# Patient Record
Sex: FEMALE | Race: WHITE | Employment: OTHER | URBAN - METROPOLITAN AREA
[De-identification: names, ages, dates, MRNs, and addresses within clinical notes are randomized per-mention and may not be internally consistent; named-entity substitution may affect disease eponyms.]

---

## 2017-01-12 ENCOUNTER — TELEPHONE (OUTPATIENT)
Dept: OTOLARYNGOLOGY | Facility: CLINIC | Age: 67
End: 2017-01-12

## 2017-01-12 DIAGNOSIS — J31.0 CHRONIC RHINITIS: Primary | ICD-10-CM

## 2017-01-12 NOTE — TELEPHONE ENCOUNTER
Reason for Call:  Other prescription    Detailed comments: patient calling. She called the pharmacy for a refill of fluticasone. She needs a refill, but she would like to get a 90 day supply.     Phone Number Patient can be reached at: Home number on file [phone] (home)    Best Time: any    Can we leave a detailed message on this number? YES    Call taken on 1/12/2017 at 3:15 PM by Danita He

## 2017-01-13 RX ORDER — FLUTICASONE PROPIONATE 50 MCG
2 SPRAY, SUSPENSION (ML) NASAL DAILY
Qty: 3 BOTTLE | Refills: 3 | Status: SHIPPED | OUTPATIENT
Start: 2017-01-13 | End: 2017-11-06

## 2017-06-19 ENCOUNTER — OFFICE VISIT (OUTPATIENT)
Dept: OPTOMETRY | Facility: CLINIC | Age: 67
End: 2017-06-19
Payer: COMMERCIAL

## 2017-06-19 DIAGNOSIS — H52.13 MYOPIA OF BOTH EYES: Primary | ICD-10-CM

## 2017-06-19 DIAGNOSIS — H52.4 PRESBYOPIA: ICD-10-CM

## 2017-06-19 DIAGNOSIS — H52.201 ASTIGMATISM OF RIGHT EYE: ICD-10-CM

## 2017-06-19 PROCEDURE — 92004 COMPRE OPH EXAM NEW PT 1/>: CPT | Performed by: OPTOMETRIST

## 2017-06-19 PROCEDURE — 92015 DETERMINE REFRACTIVE STATE: CPT | Performed by: OPTOMETRIST

## 2017-06-19 ASSESSMENT — REFRACTION_MANIFEST
OD_CYLINDER: +0.50
OD_AXIS: 060
METHOD_AUTOREFRACTION: 1
OD_CYLINDER: +0.50
OS_SPHERE: -0.25
OD_SPHERE: -1.00
OS_SPHERE: -0.50
OD_AXIS: 056
OS_ADD: +2.00
OD_SPHERE: -1.00
OS_CYLINDER: SPHERE
OD_ADD: +2.00

## 2017-06-19 ASSESSMENT — SLIT LAMP EXAM - LIDS
COMMENTS: NORMAL
COMMENTS: NORMAL

## 2017-06-19 ASSESSMENT — REFRACTION_WEARINGRX
OD_CYLINDER: +0.50
OS_SPHERE: -0.75
OS_ADD: +2.00
OD_ADD: +2.00
SPECS_TYPE: TRIFOCAL
OD_SPHERE: -1.25
OD_AXIS: 060
OS_CYLINDER: SPHERE

## 2017-06-19 ASSESSMENT — CONF VISUAL FIELD
OS_NORMAL: 1
OD_NORMAL: 1

## 2017-06-19 ASSESSMENT — VISUAL ACUITY
METHOD: SNELLEN - LINEAR
OD_CC: 20/30
OD_CC+: -1
OS_CC: 20/20
CORRECTION_TYPE: GLASSES
OD_CC: 20/20
OS_CC: 20/20-1

## 2017-06-19 ASSESSMENT — CUP TO DISC RATIO
OS_RATIO: 0.2
OD_RATIO: 0.2

## 2017-06-19 ASSESSMENT — TONOMETRY
OS_IOP_MMHG: 20
OD_IOP_MMHG: 20
IOP_METHOD: APPLANATION

## 2017-06-19 ASSESSMENT — KERATOMETRY
OS_AXISANGLE2_DEGREES: 176
OS_K1POWER_DIOPTERS: 44.00
OD_K2POWER_DIOPTERS: 45.00
OD_K1POWER_DIOPTERS: 43.75
OD_AXISANGLE2_DEGREES: 168
OS_K2POWER_DIOPTERS: 44.75

## 2017-06-19 ASSESSMENT — EXTERNAL EXAM - RIGHT EYE: OD_EXAM: NORMAL

## 2017-06-19 ASSESSMENT — EXTERNAL EXAM - LEFT EYE: OS_EXAM: NORMAL

## 2017-06-19 NOTE — MR AVS SNAPSHOT
"              After Visit Summary   6/19/2017    Rozina Bell    MRN: [id number]           Patient Information     Date Of Birth          1950        Visit Information        Provider Department      6/19/2017 12:00 PM Evita Tobias OD [facility]        Care Instructions    Patient was advised of today's exam findings.  Optional to fill new glasses prescription, minimal change  Monitor mild cataracts   Use Refresh Optive drops 2-3 times a day  Return in 1 year for eye exam    Evita Tobias O.D.  [facility]   [address] [place] [address]  [phone]            Follow-ups after your visit        Who to contact     If you have questions or need follow up information about today's clinic visit or your schedule please contact [facility] directly at [phone].  Normal or non-critical lab and imaging results will be communicated to you by MyChart, letter or phone within 4 business days after the clinic has received the results. If you do not hear from us within 7 days, please contact the clinic through MyChart or phone. If you have a critical or abnormal lab result, we will notify you by phone as soon as possible.  Submit refill requests through [employer] or call your pharmacy and they will forward the refill request to us. Please allow 3 business days for your refill to be completed.          Additional Information About Your Visit        MyChart Information     [employer] lets you send messages to your doctor, view your test results, renew your prescriptions, schedule appointments and more. To sign up, go to [email or web address] . Click on \"Log in\" on the left side of the screen, which will take you to the Welcome page. Then click on \"Sign up Now\" on the right side of the page.     You will be asked to enter the access code listed below, as well as some personal information. Please follow the directions to create your username and password.     Your " access code is: [email or web address]  Expires: 2017 12:54 PM     Your access code will  in 90 days. If you need help or a new code, please call your [facility] clinic or [phone].        Care EveryWhere ID     This is your Care EveryWhere ID. This could be used by other organizations to access your [facility] medical records  [id number]         Blood Pressure from Last 3 Encounters:   10/17/16 136/88   16 144/62   16 (!) 152/93    Weight from Last 3 Encounters:   10/17/16 126.1 kg (278 lb)   16 133.4 kg (294 lb)   08/18/15 126.1 kg (278 lb)              Today, you had the following     No orders found for display       Primary Care Provider Office Phone # Fax #    Deisy Sharpe [phone]       [facility] [address] [address]        Thank you!     [facility] you for [facility]  [facility]. Our goal is always to provide you with excellent care. Hearing back from our patients is one way we can continue to improve our services. Please take a few minutes to complete the written survey that you may receive in the mail after your visit with us. Thank you!             Your Updated Medication List - Protect others around you: Learn how to safely use, store and throw away your medicines at www.disposemymeds.org.          This list is accurate as of: 17 12:54 PM.  Always use your most recent med list.                   Brand Name Dispense Instructions for use    calcium + D 600-200 MG-UNIT Tabs   Generic drug:  calcium carbonate-vitamin D      1 TABLET TWICE  DAILY       * fluticasone 50 MCG/ACT spray    FLONASE    16 g    Spray 2 sprays into both nostrils daily       * fluticasone 50 MCG/ACT spray    FLONASE    3 Bottle    Spray 2 sprays into both nostrils daily       GLUCOSAMINE CHONDROITIN Tabs      1 TABLET DAILY       levothyroxine 50 MCG tablet    SYNTHROID/LEVOTHROID    90 tablet    Take 1 tablet (50 mcg) by mouth  daily       magnesium oxide 400 MG Caps     270 capsule    Take 1 tablet by mouth 3 times daily       meloxicam 15 MG tablet    MOBIC    90 tablet    TAKE 1 TABLET BY MOUTH EVERY DAY.       MULTI-VITAMIN PO      None Entered       * order for DME     1 Device    Equipment being ordered: compression stocking joey       * order for DME     1 Device    Equipment being ordered: [id number] Thumb Splint, MD RICARDO [id number] Thumb splint, HAILEY LT MD       VITAMIN C PO      Take 500 mg by mouth daily       vitamin D 1000 UNITS capsule     180 capsule    Take 2,000 Units by mouth daily       VITAMIN E PO      1 TABLET DAILY       * Notice:  This list has 4 medication(s) that are the same as other medications prescribed for you. Read the directions carefully, and ask your doctor or other care provider to review them with you.

## 2017-06-19 NOTE — PATIENT INSTRUCTIONS
Patient was advised of today's exam findings.  Optional to fill new glasses prescription, minimal change  Monitor mild cataracts   Use Preservative free artificial tears in vials 2-3 times a day  Return in 1 year for eye exam    Evita Tobias O.D.  [facility]   [address] [place], [place] [id number] [phone]

## 2017-06-19 NOTE — PROGRESS NOTES
Chief Complaint   Patient presents with     COMPREHENSIVE EYE EXAM        Have my cataracts changed?    Last Eye Exam: Last exam was 2015  Dilated Previously: Yes    What are you currently using to see?  Glasses, has two pairs, prefers the older ones        Distance Vision Acuity: Noticed gradual change in both eyes, wears glasses to drive. But sometimes goes without them at home     Near Vision Acuity: Also has noticed change to her near vision as well, also has trouble adapting from far to near.     Eye Comfort: dry and itchy at times, mouth also dry from medications  Do you use eye drops? : Yes: Uses them sometimes, Some tears in bottles made eyes feel worse, liked ones in vials  Occupation or Hobbies: Retired     Albertina Apple Optometric Assistant           Medical, surgical and family histories reviewed and updated 6/19/2017.       OBJECTIVE: See Ophthalmology exam    ASSESSMENT:    ICD-10-CM    1. Myopia of both eyes H52.13 EYE EXAM (SIMPLE-NONBILLABLE)     REFRACTIVE STATUS   2. Presbyopia H52.4 EYE EXAM (SIMPLE-NONBILLABLE)     REFRACTIVE STATUS   3. Astigmatism of right eye H52.201 EYE EXAM (SIMPLE-NONBILLABLE)     REFRACTIVE STATUS   4. Cataract, nuclear, bilateral H25.13 EYE EXAM (SIMPLE-NONBILLABLE)     REFRACTIVE STATUS      PLAN:     Patient Instructions   Patient was advised of today's exam findings.  Optional to fill new glasses prescription, minimal change  Monitor mild cataracts   Use Preservative free artificial tears in vials 2-3 times a day  Return in 1 year for eye exam    Evita Tobias O.D.  [facility]   [address] [place] [address] [phone]

## 2017-07-14 DIAGNOSIS — R09.82 PND (POST-NASAL DRIP): ICD-10-CM

## 2017-07-14 NOTE — TELEPHONE ENCOUNTER
fluticasone (FLONASE) 50 MCG/ACT spray      Last Written Prescription Date: 1/13/17  Last Fill Quantity: 3,  # refills: 3   Last Office Visit with [facility], [facility] or [facility] prescribing provider: 5/11/16                                         Next 5 appointments (look out 90 days)     Jul 17, 2017  2:15 PM CDT   SHORT with Deisy Sharpe MD   [facility] ([facility])    [address]  [place] [address]   [phone]

## 2017-07-17 ENCOUNTER — OFFICE VISIT (OUTPATIENT)
Dept: FAMILY MEDICINE | Facility: CLINIC | Age: 67
End: 2017-07-17
Payer: COMMERCIAL

## 2017-07-17 VITALS
HEIGHT: 64 IN | TEMPERATURE: 98.8 F | BODY MASS INDEX: 47.12 KG/M2 | WEIGHT: 276 LBS | SYSTOLIC BLOOD PRESSURE: 134 MMHG | DIASTOLIC BLOOD PRESSURE: 86 MMHG | OXYGEN SATURATION: 99 % | HEART RATE: 76 BPM

## 2017-07-17 DIAGNOSIS — I10 HYPERTENSION GOAL BP (BLOOD PRESSURE) < 140/90: ICD-10-CM

## 2017-07-17 DIAGNOSIS — R35.0 URINARY FREQUENCY: Primary | ICD-10-CM

## 2017-07-17 DIAGNOSIS — M54.50 CHRONIC BILATERAL LOW BACK PAIN WITHOUT SCIATICA: ICD-10-CM

## 2017-07-17 DIAGNOSIS — J32.0 CHRONIC MAXILLARY SINUSITIS: ICD-10-CM

## 2017-07-17 DIAGNOSIS — E03.9 ACQUIRED HYPOTHYROIDISM: ICD-10-CM

## 2017-07-17 DIAGNOSIS — J34.89 SINUS PAIN: ICD-10-CM

## 2017-07-17 DIAGNOSIS — G89.29 CHRONIC BILATERAL LOW BACK PAIN WITHOUT SCIATICA: ICD-10-CM

## 2017-07-17 DIAGNOSIS — M18.0 PRIMARY OSTEOARTHRITIS OF BOTH FIRST CARPOMETACARPAL JOINTS: ICD-10-CM

## 2017-07-17 LAB
ALBUMIN UR-MCNC: NEGATIVE MG/DL
APPEARANCE UR: CLEAR
BILIRUB UR QL STRIP: NEGATIVE
COLOR UR AUTO: YELLOW
GLUCOSE UR STRIP-MCNC: NEGATIVE MG/DL
HGB UR QL STRIP: NEGATIVE
KETONES UR STRIP-MCNC: NEGATIVE MG/DL
LEUKOCYTE ESTERASE UR QL STRIP: NEGATIVE
NITRATE UR QL: NEGATIVE
PH UR STRIP: 6 PH (ref 5–7)
SP GR UR STRIP: <=1.005 (ref 1–1.03)
URN SPEC COLLECT METH UR: NORMAL
UROBILINOGEN UR STRIP-ACNC: 0.2 EU/DL (ref 0.2–1)

## 2017-07-17 PROCEDURE — 81003 URINALYSIS AUTO W/O SCOPE: CPT | Performed by: INTERNAL MEDICINE

## 2017-07-17 PROCEDURE — 99214 OFFICE O/P EST MOD 30 MIN: CPT | Performed by: INTERNAL MEDICINE

## 2017-07-17 RX ORDER — HYDROXYZINE HYDROCHLORIDE 25 MG/1
25 TABLET, FILM COATED ORAL 3 TIMES DAILY PRN
COMMUNITY
End: 2017-12-27

## 2017-07-17 RX ORDER — LEVOTHYROXINE SODIUM 50 MCG
50 TABLET ORAL DAILY
Qty: 90 TABLET | Refills: 1 | Status: SHIPPED | OUTPATIENT
Start: 2017-07-17 | End: 2017-11-06

## 2017-07-17 RX ORDER — FLUTICASONE PROPIONATE 50 MCG
2 SPRAY, SUSPENSION (ML) NASAL DAILY
Qty: 16 G | Refills: 3 | Status: SHIPPED | OUTPATIENT
Start: 2017-07-17 | End: 2017-11-06

## 2017-07-17 ASSESSMENT — PAIN SCALES - GENERAL: PAINLEVEL: SEVERE PAIN (6)

## 2017-07-17 NOTE — TELEPHONE ENCOUNTER
Pending Prescriptions:                       Disp   Refills    fluticasone (FLONASE) 50 MCG/ACT spray    16 g   3            Sig: Spray 2 sprays into both nostrils daily

## 2017-07-17 NOTE — MR AVS SNAPSHOT
After Visit Summary   7/17/2017    Rozina Bell    MRN: [id number]           Patient Information     Date Of Birth          1950        Visit Information        Provider Department      7/17/2017 2:15 PM Deisy Sharpe MD [facility]        Today's Diagnoses     Urinary frequency    -  1    Sinus pain        Chronic bilateral low back pain without sciatica        Hypertension goal BP (blood pressure) < 140/90        Primary osteoarthritis of both first carpometacarpal joints        Chronic maxillary sinusitis        Acquired hypothyroidism          Care Instructions    Follow up with Dr. De La Cruz for your thumbs.  Schedule the CT scan of your sinuses.  Imaging Scheduling number is [phone].  Try Allegra (alert) or Zyrtec (sedating) for allergies.    [facility]    If you have any questions regarding to your visit please contact your care team:     Team Pink:   Clinic Hours Telephone Number   Internal Medicine:  Dr. Deisy Porter NP       7am-7pm  Monday - Thursday   7am-5pm  Fridays  [phone]  (Appointment scheduling available 24/7)    Questions about your visit?  Team Line  [phone]   Urgent Care - Divya Boateng and [place] - 11am-9pm Monday-Friday Saturday-Sunday- 9am-5pm   [place] - 5pm-9pm Monday-Friday Saturday-Sunday- 9am-5pm  [phone] - Divya   [phone] - [place]       What options do I have for visits at the clinic other than the traditional office visit?  To expand how we care for you, many of our providers are utilizing electronic visits (e-visits) and telephone visits, when medically appropriate, for interactions with their patients rather than a visit in the clinic.   We also offer nurse visits for many medical concerns. Just like any other service, we will bill your insurance company for this type of visit based on time spent on the phone with your provider. Not all insurance  companies cover these visits. Please check with your medical insurance if this type of visit is covered. You will be responsible for any charges that are not paid by your insurance.      E-visits via [employer]:  generally incur a $35.00 fee.  Telephone visits:  Time spent on the phone: *charged based on time that is spent on the phone in increments of 10 minutes. Estimated cost:   5-10 mins $30.00   11-20 mins. $59.00   21-30 mins. $85.00   Use [employer] (secure email communication and access to your chart) to send your primary care provider a message or make an appointment. Ask someone on your Team how to sign up for [employer].    For a Price Quote for your services, please call our Sl[facility] Line at [phone].    As always, Thank you for trusting us with your health care needs!    Discharge by MARY LAWRENCE             Follow-ups after your visit        Future tests that were ordered for you today     Open Future Orders        Priority Expected Expires Ordered    CT Maxillofacial w/o Contrast Routine  7/17/2018 7/17/2017            Who to contact     If you have questions or need follow up information about today's clinic visit or your schedule please contact [facility] directly at [phone].  Normal or non-critical lab and imaging results will be communicated to you by [employer], letter or phone within 4 business days after the clinic has received the results. If you do not hear from us within 7 days, please contact the clinic through [employer] or phone. If you have a critical or abnormal lab result, we will notify you by phone as soon as possible.  Submit refill requests through [employer] or call your pharmacy and they will forward the refill request to us. Please allow 3 business days for your refill to be completed.          Additional Information About Your Visit        [employer] Information     [employer] lets you send messages to your doctor, view your test results, renew your prescriptions, schedule  "appointments and more. To sign up, go to [email or web address] . Click on \"Log in\" on the left side of the screen, which will take you to the Welcome page. Then click on \"Sign up Now\" on the right side of the page.     You will be asked to enter the access code listed below, as well as some personal information. Please follow the directions to create your username and password.     Your access code is: [email or web address]  Expires: 2017 12:54 PM     Your access code will  in 90 days. If you need help or a new code, please call your [facility] clinic or [phone].        Care EveryWhere ID     This is your Care EveryWhere ID. This could be used by other organizations to access your [facility] medical records  [id number]        Your Vitals Were     Pulse Temperature Height Pulse Oximetry BMI (Body Mass Index)       76 98.8  F (37.1  C) (Oral) 5' 3.5\" (1.613 m) 99% 48.12 kg/m2        Blood Pressure from Last 3 Encounters:   17 134/86   10/17/16 136/88   16 144/62    Weight from Last 3 Encounters:   17 276 lb (125.2 kg)   10/17/16 278 lb (126.1 kg)   16 294 lb (133.4 kg)              We Performed the Following     *UA reflex to Microscopic and Culture ([place] and [facility] (except [place] and [place])          Today's Medication Changes          These changes are accurate as of: 17  3:01 PM.  If you have any questions, ask your nurse or doctor.               Start taking these medicines.        Dose/Directions    SYNTHROID 50 MCG tablet   Used for:  Acquired hypothyroidism   Generic drug:  levothyroxine   Replaces:  levothyroxine 50 MCG tablet   Started by:  Deisy Sharpe MD        Dose:  50 mcg   Take 1 tablet (50 mcg) by mouth daily   Quantity:  90 tablet   Refills:  1         Stop taking these medicines if you haven't already. Please contact your care team if you have questions.     levothyroxine 50 MCG tablet   Commonly known as:  SYNTHROID/LEVOTHROID   Replaced by:  " SYNTHROID 50 MCG tablet   Stopped by:  Deisy Sharpe MD                Where to get your medicines      [id number] [place], [place] - [address]  [address], [place] [address]    Hours:  24-hours Phone:  [phone]     SYNTHROID 50 MCG tablet                Primary Care Provider Office Phone # Fax #    Deisy Sharpe [phone]       [facility] [address] [address]        Equal Access to Services     [facility]: Hadii aad ku hadasho Soomaali, waaxda luqadaha, qaybta kaalmada adeegyada, waxay ashiain hayaan judith tavares . So [facility] [phone].    ATENCIÓN: Si habla español, tiene a perkins disposición servicios gratuitos de asistencia lingüística. [facility] [phone].    We comply with applicable federal civil rights laws and [place] laws. We do not discriminate on the basis of race, color, national origin, age, disability sex, sexual orientation or gender identity.            Thank you!     Thank you for choosing [facility]  for your care. Our goal is always to provide you with excellent care. Hearing back from our patients is one way we can continue to improve our services. Please take a few minutes to complete the written survey that you may receive in the mail after your visit with us. Thank you!             Your Updated Medication List - Protect others around you: Learn how to safely use, store and throw away your medicines at www.disposemymeds.org.          This list is accurate as of: 7/17/17  3:01 PM.  Always use your most recent med list.                   Brand Name Dispense Instructions for use Diagnosis    calcium + D 600-200 MG-UNIT Tabs   Generic drug:  calcium carbonate-vitamin D      1 TABLET TWICE  DAILY        * fluticasone 50 MCG/ACT spray    FLONASE    16 g    Spray 2 sprays into both nostrils daily    PND  (post-nasal drip)       * fluticasone 50 MCG/ACT spray    FLONASE    3 Bottle    Spray 2 sprays into both nostrils daily    Chronic rhinitis       GLUCOSAMINE CHONDROITIN Tabs      1 TABLET DAILY        hydrOXYzine 25 MG tablet    ATARAX     Take 25 mg by mouth 3 times daily as needed for itching        magnesium oxide 400 MG Caps     270 capsule    Take 1 tablet by mouth 3 times daily    Hypomagnesemia       meloxicam 15 MG tablet    MOBIC    90 tablet    TAKE 1 TABLET BY MOUTH EVERY DAY.    Spinal stenosis, unspecified spinal region       MULTI-VITAMIN PO      None Entered        * order for DME     1 Device    Equipment being ordered: compression stocking joey    Edema       * order for DME     1 Device    Equipment being ordered: [id number] Thumb Splint, CMC RT, MD  [id number] Thumb splint, CMC, LT MD    Primary osteoarthritis of first carpometacarpal joint of right hand, Primary osteoarthritis of first carpometacarpal joint of left hand       SYNTHROID 50 MCG tablet   Generic drug:  levothyroxine     90 tablet    Take 1 tablet (50 mcg) by mouth daily    Acquired hypothyroidism       VITAMIN C PO      Take 500 mg by mouth daily        vitamin D 1000 UNITS capsule     180 capsule    Take 2,000 Units by mouth daily    Hypovitaminosis D       VITAMIN E PO      1 TABLET DAILY        * Notice:  This list has 4 medication(s) that are the same as other medications prescribed for you. Read the directions carefully, and ask your doctor or other care provider to review them with you.

## 2017-07-17 NOTE — PATIENT INSTRUCTIONS
Follow up with Dr. De La Cruz for your thumbs.  Schedule the CT scan of your sinuses.  Imaging Scheduling number is [phone].  Try Allegra (alert) or Zyrtec (sedating) for allergies.    [facility]    If you have any questions regarding to your visit please contact your care team:     Team Pink:   Clinic Hours Telephone Number   Internal Medicine:  Dr. Deisy Porter, NP       7am-7pm  Monday - Thursday   7am-5pm  Fridays  [phone]  (Appointment scheduling available 24/7)    Questions about your visit?  Team Line  [phone]   Urgent Care - Divya Boateng and [place] - 11am-9pm Monday-Friday Saturday-Sunday- 9am-5pm   [place] - 5pm-9pm Monday-Friday Saturday-Sunday- 9am-5pm  [phone] - Divya   [phone] - [place]       What options do I have for visits at the clinic other than the traditional office visit?  To expand how we care for you, many of our providers are utilizing electronic visits (e-visits) and telephone visits, when medically appropriate, for interactions with their patients rather than a visit in the clinic.   We also offer nurse visits for many medical concerns. Just like any other service, we will bill your insurance company for this type of visit based on time spent on the phone with your provider. Not all insurance companies cover these visits. Please check with your medical insurance if this type of visit is covered. You will be responsible for any charges that are not paid by your insurance.      E-visits via [employer]:  generally incur a $35.00 fee.  Telephone visits:  Time spent on the phone: *charged based on time that is spent on the phone in increments of 10 minutes. Estimated cost:   5-10 mins $30.00   11-20 mins. $59.00   21-30 mins. $85.00   Use [employer] (secure email communication and access to your chart) to send your primary care provider a message or make an appointment. Ask someone on your Team how to  sign up for [employer].    For a Price Quote for your services, please call our Consumer Price Line at [phone].    As always, Thank you for trusting us with your health care needs!    Discharge by MARY LAWRENCE

## 2017-07-17 NOTE — LETTER
[facility]  [address] [place] [address]    July 18, 2017    Rozina Bell   [address] [id number]  [place] [address]          Dear Rozina,    Natasha is a copy of your results.  This is a normal urine study.    Results for orders placed or performed in visit on 07/17/17   *UA reflex to Microscopic and Culture ([place] and [facility] (except [place] and [place])   Result Value Ref Range    Color Urine Yellow     Appearance Urine Clear     Glucose Urine Negative NEG mg/dL    Bilirubin Urine Negative NEG    Ketones Urine Negative NEG mg/dL    Specific Gravity Urine <=1.005 1.003 - 1.035    Blood Urine Negative NEG    pH Urine 6.0 5.0 - 7.0 pH    Protein Albumin Urine Negative NEG mg/dL    Urobilinogen Urine 0.2 0.2 - 1.0 EU/dL    Nitrite Urine Negative NEG    Leukocyte Esterase Urine Negative NEG    Source Midstream Urine      If you have any questions or concerns, please call myself or my nurse at [phone].    Sincerely,        Deisy Sharpe MD/rk

## 2017-07-17 NOTE — NURSING NOTE
"Chief Complaint   Patient presents with     Back Pain     ongoing      Sinus Problem     more headache than usual, bloody in the nose      Kidney Problem     wants kidney checked        Initial Pulse 76  Temp 98.8  F (37.1  C) (Oral)  Ht 5' 3.5\" (1.613 m)  Wt 276 lb (125.2 kg)  SpO2 99%  BMI 48.12 kg/m2 Estimated body mass index is 48.12 kg/(m^2) as calculated from the following:    Height as of this encounter: 5' 3.5\" (1.613 m).    Weight as of this encounter: 276 lb (125.2 kg).  Medication Reconciliation: complete     Jenna Carey MA       "

## 2017-07-17 NOTE — PROGRESS NOTES
"INTERNAL MEDICINE  SUBJECTIVE:                                                    Rozina Bell is a 67 year old female who presents to clinic today for the following health issues:    Back pain: Patient notes her back pain is ongoing. However, for the past month, current back pain has been \"different\" than her usual pain. The pain is present in lower back. She mentions it feels similar to when she had a kidney infection in college. She would like to have kidney function checked. She has constant urinary frequency. She denies hematuria and dysuria. She follows up with Dr Mckeon next week.     Sinus pressure: She endorses sinus pressure and worsening headaches since May. She relates she has sores in her nose, that bleed frequently. She uses flonase nasal spray. Patient notes associated sore throat as well due to allergies. No other allergy medications.     Elevated BP: BP is elevated in clinic today, 134/86. Her blood pressure was 111/70 at home prior to appointment.     Bilateral thumb pain: Inquires about surgery on thumbs. She is unable to knit as her thumbs are weak.     Cataracts: Recently developing. Follows with optometrist.     Easy bruising: She mentions her right 2nd finger bruises easily around vein.     Hypothyroidism: She reports constant fatigue. She is taking levothyroxine.     Weight loss: She notes she has been eating lentils frequently for protein intake. She has lost 20 pounds over the last year.    Wt Readings from Last 3 Encounters:   07/17/17 125.2 kg (276 lb)   10/17/16 126.1 kg (278 lb)   06/07/16 133.4 kg (294 lb)       Problem list and histories reviewed & adjusted, as indicated.  Additional history: as documented    Patient Active Problem List   Diagnosis     Hypothyroidism     Allergic state     Spinal stenosis     CARDIOVASCULAR SCREENING; LDL GOAL LESS THAN 160     Fibromyalgia     Obesity     AK (actinic keratosis)     Hypertension goal BP (blood pressure) < 140/90     Skin cancer "     Advanced directives, counseling/discussion     Degenerative spondylolisthesis     Metatarsal fracture     Prepatellar bursitis     MMT (medial meniscus tear)     Lumbar spinal stenosis     Hypovitaminosis D     Primary osteoarthritis of both first carpometacarpal joints     Past Surgical History:   Procedure Laterality Date     BREAST BIOPSY, RT/LT  8/08    Breat Biopsy RT/LT     C VAG HYST,RMV TUBE/OVARY  age 34    endometriosis     HC REMOVAL GALLBLADDER  age 31     HYSTERECTOMY, PAP NO LONGER INDICATED         Social History   Substance Use Topics     Smoking status: Never Smoker     Smokeless tobacco: Never Used      Comment: Lives in smoke free household     Alcohol use No     Family History   Problem Relation Age of Onset     HEART DISEASE Mother      Arthritis Mother      Cardiovascular Mother      Thyroid Disease Mother      Hypertension Mother      CEREBROVASCULAR DISEASE Mother      GASTROINTESTINAL DISEASE Father      CANCER Father      Respiratory Father      HEART DISEASE Maternal Grandfather      DIABETES Paternal Grandfather      CANCER Brother      DIABETES Brother      HEART DISEASE Sister      Neurologic Disorder Sister      EPILEPSY     Allergies Son      HEART DISEASE Brother      TRIPLE BYPASS     Lipids Brother      Hypertension Brother      Chronic Obstructive Pulmonary Disease Brother      Glaucoma No family hx of      Macular Degeneration No family hx of          Labs reviewed in EPIC    Reviewed and updated as needed this visit by clinical staff  Tobacco  Allergies  Meds  Med Hx  Surg Hx  Fam Hx  Soc Hx      Reviewed and updated as needed this visit by Provider         ROS:  C: NEGATIVE for fever, chills, change in weight  I: NEGATIVE for worrisome rashes, moles or lesions  E: POSITIVE for cataracts NEGATIVE for vision changes or irritation  E/M: POSITIVE for sore throat, sinus pressure, epistaxis NEGATIVE for ear, mouth   R: NEGATIVE for significant cough or SOB  CV: NEGATIVE for  "chest pain, palpitations or peripheral edema  : POSITIVE for urinary frequency NEGATIVE for dysuria, or hematuria  M: POSITIVE for low back pain NEGATIVE for significant arthralgias or myalgia  N: POSITIVE for headaches NEGATIVE for weakness, dizziness or paresthesias  P: NEGATIVE for changes in mood or affect  All other systems reviewed and were negative.    This document serves as a record of the services and decisions personally performed and made by Deisy Sharpe MD. It was created on her behalf by Radha Cain, a trained medical scribe. The creation of this document is based on the provider's statements to the medical scribe.  Radha Cain 2:35 PM July 17, 2017    OBJECTIVE:   /86  Pulse 76  Temp 98.8  F (37.1  C) (Oral)  Ht 1.613 m (5' 3.5\")  Wt 125.2 kg (276 lb)  SpO2 99%  BMI 48.12 kg/m2  Body mass index is 48.12 kg/(m^2).  GENERAL: healthy, alert and no distress  HENT: nose with areas of coagulated blood on medial aspect of nares, and mouth without ulcers or lesions  NECK: no adenopathy, no asymmetry, masses, or scars and thyroid normal to palpation  RESP: lungs clear to auscultation - no rales, rhonchi or wheezes  CV: regular rate and rhythm, normal S1 S2, no S3 or S4, no murmur, click or rub, no peripheral edema and peripheral pulses strong  ABDOMEN: soft, nontender, no hepatosplenomegaly, no masses and bowel sounds normal  MS: no gross musculoskeletal defects noted, no edema, Hypertrophy at base of bilateral thumbs  NEURO: Normal strength and tone, mentation intact and speech normal  BACK: no CVA tenderness, no paralumbar tenderness  PSYCH: mentation appears normal, affect normal/bright      Diagnostic Test Results:  No results found for this or any previous visit (from the past 24 hour(s)).    ASSESSMENT/PLAN:   I spent 20 minutes of time with the patient and >50% of it was in education and counseling regarding low back pain and sinus pressure.    1. Urinary frequency  Patient " "notes urinary frequency and \"different\" back pain that is similar to pain she experienced with previous kidney infection. UA today to evaluate. I highly suspect this is related to musculoskeletal cause.    - *UA reflex to Microscopic and Culture ([place] and [facility] (except [place] and [place])    2. Sinus pain  Evaluation for sinusitis with CT scan. Will treat pending results. The last antibiotic she took worked well.  Has many allergies.      3. Chronic bilateral low back pain without sciatica  Ongoing, patient is constant. Continue current medication regimen.     4. Hypertension goal BP (blood pressure) < 140/90  BP slightly elevated in clinic today, however BP has been well controlled at home. Continue current measures.     5. Primary osteoarthritis of both first carpometacarpal joints  Follow up with Dr De La Cruz to discuss further options.     6. Chronic maxillary sinusitis  Will treat pending CT scan. Recommended patient use OTC antihistamine for allergy relief. Consider follow up with ENT.   - CT Maxillofacial w/o Contrast; Future    7. Acquired hypothyroidism  - SYNTHROID 50 MCG tablet; Take 1 tablet (50 mcg) by mouth daily  Dispense: 90 tablet; Refill: 1  Would like to try trade name to see if this makes her less tired.    Patient Instructions   Follow up with Dr. De La Cruz for your thumbs.  Schedule the CT scan of your sinuses.  Imaging Scheduling number is [phone].  Try Allegra (alert) or Zyrtec (sedating) for allergies.      The information in this document, created by the medical scribe for me, accurately reflects the services I personally performed and the decisions made by me. I have reviewed and approved this document for accuracy prior to leaving the patient care area.  July 17, 2017 2:59 PM    Deisy Sharpe MD  [facility]    Start 2:40 PM   End 3:00 PM    "

## 2017-07-24 ENCOUNTER — MEDICAL CORRESPONDENCE (OUTPATIENT)
Dept: MRI IMAGING | Facility: CLINIC | Age: 67
End: 2017-07-24

## 2017-08-01 ENCOUNTER — TRANSFERRED RECORDS (OUTPATIENT)
Dept: HEALTH INFORMATION MANAGEMENT | Facility: CLINIC | Age: 67
End: 2017-08-01

## 2017-08-11 ENCOUNTER — MEDICAL CORRESPONDENCE (OUTPATIENT)
Dept: HEALTH INFORMATION MANAGEMENT | Facility: CLINIC | Age: 67
End: 2017-08-11

## 2017-08-15 ENCOUNTER — TELEPHONE (OUTPATIENT)
Dept: INTERNAL MEDICINE | Facility: CLINIC | Age: 67
End: 2017-08-15

## 2017-08-15 NOTE — TELEPHONE ENCOUNTER
"Rozina Bell is a 67 year old female who calls with intermittent SOB, palpitation.    NURSING ASSESSMENT:  Description:  Intermittent SOB, palpitations, feeling flushed. Denies any symptoms at present. Last episode was yesterday. BP was 117/68 last night  Onset/duration:  For a couple of months. Usually lasts a few seconds, \"when I count and get to 5 (seconds)\"  Precip. factors:    Associated symptoms: intermittent lightheadedness, arms and legs tingling, anxiety  Improves/worsens symptoms:  Episodes happen randomly  Pain scale (0-10)   0/10  LMP/preg/breast feeding:    Last exam/Treatment:  7/17/17  Allergies:   Allergies   Allergen Reactions     Amitriptyline      jittery     Celebrex [Celecoxib]      Epinephrine-Lidocaine-Na Metabisulfite Other (See Comments)     Palpitations, flushing     Latex      Morphine      Penicillins      Sulfa Drugs      Ultram [Tramadol Hcl]      Yellow Dye      Zithromax [Azithromycin]        MEDICATIONS:   Taking medication(s) as prescribed? N/A  Taking over the counter medication(s?) N/A  Any medication side effects? Not Applicable    Any barriers to taking medication(s) as prescribed?  N/A  Medication(s) improving/managing symptoms?  N/A  Medication reconciliation completed: N/A      NURSING PLAN: Nursing advice to patient to be seen for further eval    RECOMMENDED DISPOSITION: call 911 or have someone take you to the ED if symptoms return or persists next time. Appointment scheduled to see DR. Sharpe tomorrow    Will comply with recommendation: Yes  If further questions/concerns or if symptoms do not improve, worsen or new symptoms develop, call your PCP or [facility] as soon as possible.      Guideline used:  Telephone Triage Protocols for Nurses, Fourth Edition, Jane Chaparro RN    "

## 2017-08-16 ENCOUNTER — OFFICE VISIT (OUTPATIENT)
Dept: INTERNAL MEDICINE | Facility: CLINIC | Age: 67
End: 2017-08-16
Payer: COMMERCIAL

## 2017-08-16 VITALS
SYSTOLIC BLOOD PRESSURE: 132 MMHG | DIASTOLIC BLOOD PRESSURE: 72 MMHG | HEART RATE: 80 BPM | TEMPERATURE: 99.8 F | OXYGEN SATURATION: 98 % | WEIGHT: 276 LBS | BODY MASS INDEX: 48.12 KG/M2

## 2017-08-16 DIAGNOSIS — R00.2 PALPITATIONS: Primary | ICD-10-CM

## 2017-08-16 DIAGNOSIS — E66.01 MORBID OBESITY DUE TO EXCESS CALORIES (H): ICD-10-CM

## 2017-08-16 DIAGNOSIS — R23.2 HOT FLASHES: ICD-10-CM

## 2017-08-16 LAB
ANION GAP SERPL CALCULATED.3IONS-SCNC: 4 MMOL/L (ref 3–14)
BASOPHILS # BLD AUTO: 0.1 10E9/L (ref 0–0.2)
BASOPHILS NFR BLD AUTO: 1.3 %
BUN SERPL-MCNC: 17 MG/DL (ref 7–30)
CALCIUM SERPL-MCNC: 9.3 MG/DL (ref 8.5–10.1)
CHLORIDE SERPL-SCNC: 102 MMOL/L (ref 94–109)
CO2 SERPL-SCNC: 31 MMOL/L (ref 20–32)
CREAT SERPL-MCNC: 0.75 MG/DL (ref 0.52–1.04)
DIFFERENTIAL METHOD BLD: NORMAL
EOSINOPHIL # BLD AUTO: 0.3 10E9/L (ref 0–0.7)
EOSINOPHIL NFR BLD AUTO: 5.2 %
ERYTHROCYTE [DISTWIDTH] IN BLOOD BY AUTOMATED COUNT: 13.2 % (ref 10–15)
GFR SERPL CREATININE-BSD FRML MDRD: 77 ML/MIN/1.7M2
GLUCOSE SERPL-MCNC: 82 MG/DL (ref 70–99)
HCT VFR BLD AUTO: 41.6 % (ref 35–47)
HGB BLD-MCNC: 13.7 G/DL (ref 11.7–15.7)
LYMPHOCYTES # BLD AUTO: 1.6 10E9/L (ref 0.8–5.3)
LYMPHOCYTES NFR BLD AUTO: 31.4 %
MAGNESIUM SERPL-MCNC: 2 MG/DL (ref 1.6–2.3)
MCH RBC QN AUTO: 30.9 PG (ref 26.5–33)
MCHC RBC AUTO-ENTMCNC: 32.9 G/DL (ref 31.5–36.5)
MCV RBC AUTO: 94 FL (ref 78–100)
MONOCYTES # BLD AUTO: 0.6 10E9/L (ref 0–1.3)
MONOCYTES NFR BLD AUTO: 11.3 %
NEUTROPHILS # BLD AUTO: 2.7 10E9/L (ref 1.6–8.3)
NEUTROPHILS NFR BLD AUTO: 50.8 %
PLATELET # BLD AUTO: 267 10E9/L (ref 150–450)
POTASSIUM SERPL-SCNC: 4.6 MMOL/L (ref 3.4–5.3)
RBC # BLD AUTO: 4.43 10E12/L (ref 3.8–5.2)
SODIUM SERPL-SCNC: 137 MMOL/L (ref 133–144)
TSH SERPL DL<=0.005 MIU/L-ACNC: 2.04 MU/L (ref 0.4–4)
WBC # BLD AUTO: 5.2 10E9/L (ref 4–11)

## 2017-08-16 PROCEDURE — 93000 ELECTROCARDIOGRAM COMPLETE: CPT | Performed by: INTERNAL MEDICINE

## 2017-08-16 PROCEDURE — 93225 XTRNL ECG REC<48 HRS REC: CPT | Performed by: INTERNAL MEDICINE

## 2017-08-16 PROCEDURE — 99214 OFFICE O/P EST MOD 30 MIN: CPT | Performed by: INTERNAL MEDICINE

## 2017-08-16 PROCEDURE — 36415 COLL VENOUS BLD VENIPUNCTURE: CPT | Performed by: INTERNAL MEDICINE

## 2017-08-16 PROCEDURE — 84443 ASSAY THYROID STIM HORMONE: CPT | Performed by: INTERNAL MEDICINE

## 2017-08-16 PROCEDURE — 85025 COMPLETE CBC W/AUTO DIFF WBC: CPT | Performed by: INTERNAL MEDICINE

## 2017-08-16 PROCEDURE — 83735 ASSAY OF MAGNESIUM: CPT | Performed by: INTERNAL MEDICINE

## 2017-08-16 PROCEDURE — 80048 BASIC METABOLIC PNL TOTAL CA: CPT | Performed by: INTERNAL MEDICINE

## 2017-08-16 PROCEDURE — 0298T ZZC EXT ECG > 48HR TO 21 DAY REVIEW AND INTERPRETATN: CPT | Performed by: INTERNAL MEDICINE

## 2017-08-16 NOTE — LETTER
[address] [place] [address]    August 17, 2017    Rozina Bell  [address] [address]  [place] [address]          Dear Rozina,    Normal thyroid.   Normal kidney function and electrolytes.   Normal magnesium.     If you have any questions please feel free to contact [phone] or myself via [employer].     Enclosed is a copy of your results.     Results for orders placed or performed in visit on 08/16/17   CBC with platelets differential   Result Value Ref Range    WBC 5.2 4.0 - 11.0 10e9/L    RBC Count 4.43 3.8 - 5.2 10e12/L    Hemoglobin 13.7 11.7 - 15.7 g/dL    Hematocrit 41.6 35.0 - 47.0 %    MCV 94 78 - 100 fl    MCH 30.9 26.5 - 33.0 pg    MCHC 32.9 31.5 - 36.5 g/dL    RDW 13.2 10.0 - 15.0 %    Platelet Count 267 150 - 450 10e9/L    Diff Method Automated Method     % Neutrophils 50.8 %    % Lymphocytes 31.4 %    % Monocytes 11.3 %    % Eosinophils 5.2 %    % Basophils 1.3 %    Absolute Neutrophil 2.7 1.6 - 8.3 10e9/L    Absolute Lymphocytes 1.6 0.8 - 5.3 10e9/L    Absolute Monocytes 0.6 0.0 - 1.3 10e9/L    Absolute Eosinophils 0.3 0.0 - 0.7 10e9/L    Absolute Basophils 0.1 0.0 - 0.2 10e9/L   Basic metabolic panel   Result Value Ref Range    Sodium 137 133 - 144 mmol/L    Potassium 4.6 3.4 - 5.3 mmol/L    Chloride 102 94 - 109 mmol/L    Carbon Dioxide 31 20 - 32 mmol/L    Anion Gap 4 3 - 14 mmol/L    Glucose 82 70 - 99 mg/dL    Urea Nitrogen 17 7 - 30 mg/dL    Creatinine 0.75 0.52 - 1.04 mg/dL    GFR Estimate 77 >60 mL/min/1.7m2    GFR Estimate If Black >90 >60 mL/min/1.7m2    Calcium 9.3 8.5 - 10.1 mg/dL   Magnesium   Result Value Ref Range    Magnesium 2.0 1.6 - 2.3 mg/dL   TSH with free T4 reflex   Result Value Ref Range    TSH 2.04 0.40 - 4.00 mU/L       If you have any questions or concerns, please call myself or my nurse at [phone].      Sincerely,        Deisy Sharpe MD/[facility]

## 2017-08-16 NOTE — NURSING NOTE
"Chief Complaint   Patient presents with     Shortness of Breath     Anxiety     Palptations     Other     burning, like hot flashes, but feeling very hot after going to bathroom, or getting up to do something       Initial /72  Pulse 80  Temp 99.8  F (37.7  C) (Oral)  Wt 276 lb (125.2 kg)  SpO2 98%  BMI 48.12 kg/m2 Estimated body mass index is 48.12 kg/(m^2) as calculated from the following:    Height as of 7/17/17: 5' 3.5\" (1.613 m).    Weight as of this encounter: 276 lb (125.2 kg).  Medication Reconciliation: complete   Sabine DUARTE CMA ([facility])      "

## 2017-08-16 NOTE — LETTER
[address] [place] [address]    August 29, 2017    Rozina Bell   [address] [address]  [facility] [address]          Dear Bernabe Handy Holter monitor.  Enclosed is a copy of your results.         If you have any questions or concerns, please call myself or my nurse at [phone].      Sincerely,        Betty Porter, ESEQUIEL/pb

## 2017-08-16 NOTE — PROGRESS NOTES
"  SUBJECTIVE:                                                    Rozina Bell is a 67 year old female who presents to clinic today for the following health issues:    Chest Pain  Patient reports with having trouble with experiencing a burning hot sensation that starts in her chest, radiates up to her head, and then back down her body since May 2017. She feels a \"fluttering\" heart sensation in her chest. She feels that her heart is about to come out of her chest. She also feels a tapping in her chest and throat. She is waking up at night with an episode of these symptoms. These episodes are random and infrequent. It will take her 5 minutes after a an episode to feel normal. She had an MRI completed on 08/11/2017. She normally does not panic when she goes into an MRI. She laid down and started to have an episode.    She is concerned about her heart because of these episodes in addition to her history of her magnesium and potassium levels fluctuating. She has tried to not eat foods high in potassium.     She does not experience her episodes during exercise.     She hasn't been able to lose weight due to her inability to exercise.      Family Medical History  Maternal Grandfather,  Mother, Sister- Heart problems  Brother- Triple Bypass      Problem list and histories reviewed & adjusted, as indicated.  Additional history: as documented    Patient Active Problem List   Diagnosis     Hypothyroidism     Allergic state     Spinal stenosis     CARDIOVASCULAR SCREENING; LDL GOAL LESS THAN 160     Fibromyalgia     Obesity     AK (actinic keratosis)     Hypertension goal BP (blood pressure) < 140/90     Skin cancer     Advanced directives, counseling/discussion     Degenerative spondylolisthesis     Metatarsal fracture     Prepatellar bursitis     MMT (medial meniscus tear)     Lumbar spinal stenosis     Hypovitaminosis D     Primary osteoarthritis of both first carpometacarpal joints     Past Surgical History:   Procedure " Laterality Date     BREAST BIOPSY, RT/LT  8/08    Breat Biopsy RT/LT     C VAG HYST,RMV TUBE/OVARY  age 34    endometriosis     HC REMOVAL GALLBLADDER  age 31     HYSTERECTOMY, PAP NO LONGER INDICATED         Social History   Substance Use Topics     Smoking status: Never Smoker     Smokeless tobacco: Never Used      Comment: Lives in smoke free household     Alcohol use No     Family History   Problem Relation Age of Onset     HEART DISEASE Mother      Arthritis Mother      Cardiovascular Mother      Thyroid Disease Mother      Hypertension Mother      CEREBROVASCULAR DISEASE Mother      GASTROINTESTINAL DISEASE Father      CANCER Father      Respiratory Father      HEART DISEASE Maternal Grandfather      DIABETES Paternal Grandfather      CANCER Brother      DIABETES Brother      HEART DISEASE Sister      Neurologic Disorder Sister      EPILEPSY     Allergies Son      HEART DISEASE Brother      TRIPLE BYPASS     Lipids Brother      Hypertension Brother      Chronic Obstructive Pulmonary Disease Brother      Glaucoma No family hx of      Macular Degeneration No family hx of          Current Outpatient Prescriptions   Medication Sig Dispense Refill     fluticasone (FLONASE) 50 MCG/ACT spray Spray 2 sprays into both nostrils daily 16 g 3     hydrOXYzine (ATARAX) 25 MG tablet Take 25 mg by mouth 3 times daily as needed for itching       SYNTHROID 50 MCG tablet Take 1 tablet (50 mcg) by mouth daily 90 tablet 1     fluticasone (FLONASE) 50 MCG/ACT spray Spray 2 sprays into both nostrils daily 3 Bottle 3     meloxicam (MOBIC) 15 MG tablet TAKE 1 TABLET BY MOUTH EVERY DAY. 90 tablet 3     order for DME Equipment being ordered: [id number] Thumb Splint, MD JAVED [id number] Thumb splint, HAILEY LT MD 1 Device 0     Ascorbic Acid (VITAMIN C PO) Take 500 mg by mouth daily       Cholecalciferol (VITAMIN D) 1000 UNITS capsule Take 2,000 Units by mouth daily 180 capsule 4     magnesium oxide 400 MG CAPS Take 1 tablet by mouth  3 times daily 270 capsule 4     ORDER FOR DME Equipment being ordered: compression stocking joey 1 Device 0     MULTI-VITAMIN OR None Entered       CALCIUM + D 600-200 MG-UNIT OR TABS 1 TABLET TWICE  DAILY       VITAMIN E OR 1 TABLET DAILY       GLUCOSAMINE CHONDROITIN OR TABS 1 TABLET DAILY           Reviewed and updated as needed this visit by clinical staffTobacco  Allergies  Meds  Med Hx  Surg Hx  Fam Hx  Soc Hx      Reviewed and updated as needed this visit by Provider         ROS:  Constitutional, HEENT, cardiovascular, pulmonary, GI, , musculoskeletal, neuro, skin, endocrine and psych systems are negative, except as otherwise noted.    This document serves as a record of the services and decisions personally performed and made by Deisy Sharpe MD. It was created on her behalf by Susan Hwang, a trained medical scribe. The creation of this document is based on the provider's statements to the medical scribe.  Suasn Hwang 2:12 PM August 16, 2017    OBJECTIVE:   /72  Pulse 80  Temp 99.8  F (37.7  C) (Oral)  Wt 125.2 kg (276 lb)  SpO2 98%  BMI 48.12 kg/m2  Body mass index is 48.12 kg/(m^2).   GENERAL: healthy, alert and no distress  NECK: no adenopathy, no asymmetry, masses, or scars and thyroid normal to palpation  RESP: lungs clear to auscultation - no rales, rhonchi or wheezes  CV: regular rate and rhythm, normal S1 S2, no S3 or S4, no murmur, click or rub, no peripheral edema and peripheral pulses strong  MS: no gross musculoskeletal defects noted, no edema  SKIN: no suspicious lesions or rashes  NEURO: Normal strength and tone, mentation intact and speech normal  PSYCH: mentation appears normal, affect normal/bright    Diagnostic Test Results:  No results found for this or any previous visit (from the past 24 hour(s)).    ASSESSMENT:       PLAN:   1. Palpitations  EKG was reviewed by myself. See attached.  Unclear what these episodes are.  Could be a true arrhythmia or could be hot  "flashes.  Will need zio.   - EKG 12-lead complete w/read - Clinics  - CBC with platelets differential  - Basic metabolic panel  - Magnesium  - Zio Patch 48 Hours  - TSH with free T4 reflex    2. Hot flashes  Consider gabapentin, ssri or even low dose HRT if continue to be a bother and zio is negative.   - CBC with platelets differential  - Basic metabolic panel  - Magnesium  - Zio Patch 48 Hours  - TSH with free T4 reflex    3. Morbid obesity due to excess calories (H)  Obesity - per below       BMI:   Estimated body mass index is 48.12 kg/(m^2) as calculated from the following:    Height as of 7/17/17: 5' 3.5\" (1.613 m).    Weight as of this encounter: 276 lb (125.2 kg).   Weight management plan: Discussed healthy diet and exercise guidelines and patient will follow up in 3 months in clinic to re-evaluate.      Follow Up: Return to clinic    Patient Instructions       Wear monitor sticker for the next 2 days.        [facility]    If you have any questions regarding to your visit please contact your care team:     Team Pink:   Clinic Hours Telephone Number   Internal Medicine:  Dr. Deisy Porter, NP       7am-7pm  Monday - Thursday   7am-5pm  Fridays  [phone]  (Appointment scheduling available 24/7)    Questions about your visit?  Team Line  [phone]   Urgent Care - [place] and [place] - 11am-9pm Monday-Friday Saturday-Sunday- 9am-5pm   [place] - 5pm-9pm Monday-Friday Saturday-Sunday- 9am-5pm  [phone] - [place]   [phone] - [place]       What options do I have for visits at the clinic other than the traditional office visit?  To expand how we care for you, many of our providers are utilizing electronic visits (e-visits) and telephone visits, when medically appropriate, for interactions with their patients rather than a visit in the clinic.   We also offer nurse visits for many medical concerns. Just like any other service, " we will bill your insurance company for this type of visit based on time spent on the phone with your provider. Not all insurance companies cover these visits. Please check with your medical insurance if this type of visit is covered. You will be responsible for any charges that are not paid by your insurance.      E-visits via [employer]:  generally incur a $35.00 fee.  Telephone visits:  Time spent on the phone: *charged based on time that is spent on the phone in increments of 10 minutes. Estimated cost:   5-10 mins $30.00   11-20 mins. $59.00   21-30 mins. $85.00   Use [email or web address] (secure email communication and access to your chart) to send your primary care provider a message or make an appointment. Ask someone on your Team how to sign up for [employer].    For a Price Quote for your services, please call our Consumer Price Line at [phone].    As always, Thank you for trusting us with your health care needs!    Discharged by Sabine DUARTE CMA ([facility])      The information in this document, created by the medical scribe for me, accurately reflects the services I personally performed and the decisions made by me. I have reviewed and approved this document for accuracy prior to leaving the patient care area.  August 16, 2017 2:14 PM    Deisy Sharpe MD  [facility]

## 2017-08-16 NOTE — PATIENT INSTRUCTIONS
Wear monitor sticker for the next 2 days.        [facility]    If you have any questions regarding to your visit please contact your care team:     Team Pink:   Clinic Hours Telephone Number   Internal Medicine:  Dr. Deisy Porter, NP       7am-7pm  Monday - Thursday   7am-5pm  Fridays  [phone]  (Appointment scheduling available 24/7)    Questions about your visit?  Team Line  [phone]   Urgent [facility] - [place] [facility] - 11am-9pm Monday-Friday Saturday-Sunday- 9am-5pm   [place] - 5pm-9pm Monday-Friday Saturday-Sunday- 9am-5pm  [phone] - [place]   [phone] - [place]       What options do I have for visits at the clinic other than the traditional office visit?  To expand how we care for you, many of our providers are utilizing electronic visits (e-visits) and telephone visits, when medically appropriate, for interactions with their patients rather than a visit in the clinic.   We also offer nurse visits for many medical concerns. Just like any other service, we will bill your insurance company for this type of visit based on time spent on the phone with your provider. Not all insurance companies cover these visits. Please check with your medical insurance if this type of visit is covered. You will be responsible for any charges that are not paid by your insurance.      E-visits via [employer]ee[employer]on:  generally incur a $35.00 fee.  Telephone visits:  Time spent on the phone: *charged based on time that is spent on the phone in increments of 10 minutes. Estimated cost:   5-10 mins $30.00   11-20 mins. $59.00   21-30 mins. $85.00   Use Popularohart (secure email communication and access to your chart) to send your primary care provider a message or make an appointment. Ask someone on your Team how to sign up for [employer].    For a Price Quote for your services, please call our Consumer Price Line at [phone].    As always, Thank you  for trusting us with your health care needs!    Discharged by Sabine DUARTE CMA ([facility])

## 2017-08-16 NOTE — MR AVS SNAPSHOT
After Visit Summary   8/16/2017    Rozina Bell    MRN: [id number]           Patient Information     Date Of Birth          1950        Visit Information        Provider Department      8/16/2017 12:45 PM Deisy Sharpe MD [facility]        Today's Diagnoses     Palpitations    -  1    Hot flashes          Care Instructions      Wear monitor sticker for the next 2 days.        [facility]    If you have any questions regarding to your visit please contact your care team:     Team Pink:   Clinic Hours Telephone Number   Internal Medicine:  Dr. Deisy Porter NP       7am-7pm  Monday - Thursday   7am-5pm  Fridays  [phone]  (Appointment scheduling available 24/7)    Questions about your visit?  Team Line  [phone]   Urgent Care - [place] and [place] - 11am-9pm Monday-Friday Saturday-Sunday- 9am-5pm   [place] - 5pm-9pm Monday-Friday Saturday-Sunday- 9am-5pm  [phone] - [facility]  [phone] - [place]       What options do I have for visits at the clinic other than the traditional office visit?  To expand how we care for you, many of our providers are utilizing electronic visits (e-visits) and telephone visits, when medically appropriate, for interactions with their patients rather than a visit in the clinic.   We also offer nurse visits for many medical concerns. Just like any other service, we will bill your insurance company for this type of visit based on time spent on the phone with your provider. Not all insurance companies cover these visits. Please check with your medical insurance if this type of visit is covered. You will be responsible for any charges that are not paid by your insurance.      E-visits via Workboard:  generally incur a $35.00 fee.  Telephone visits:  Time spent on the phone: *charged based on time that is spent on the phone in increments of 10 minutes. Estimated cost:    5-10 mins $30.00   11-20 mins. $59.00   21-30 mins. $85.00   Use Metavanahart (secure email communication and access to your chart) to send your primary care provider a message or make an appointment. Ask someone on your Team how to sign up for [email or web address].    For a Price Quote for your services, please call our Consumer Price Line at [phone].    As always, Thank you for trusting us with your health care needs!    Discharged by Sabine DUARTE CMA ([facility])            Follow-ups after your visit        Your next 10 appointments already scheduled     Oct 30, 2017  9:30 AM CDT   LAB with FZ LAB   [facility] ([facility]    [address] [place] [address] [id number] [phone]           Patient must bring picture ID. Patient should be prepared to give a urine specimen  Please do not eat 10-12 hours before your appointment if you are coming in fasting for labs on lipids, cholesterol, or glucose (sugar). Pregnant women should follow their Care Team instructions. Water with medications is okay. Do not drink coffee or other fluids. If you have concerns about taking  your medications, please ask at office or if scheduling via [facility], send a message by clicking on Secure Messaging, Message Your Care Team.            Nov 06, 2017 11:00 AM CST   PHYSICAL with Deisy Sharpe MD   [facility] ([facility])    [address] [place] [id number] [phone]              Who to contact     If you have questions or need follow up information about today's clinic visit or your schedule please contact [facility] directly at [phone].  Normal or non-critical lab and imaging results will be communicated to you by MyChart, letter or phone within 4 business days after the clinic has received the results. If you do not hear from us within 7 days, please contact the clinic through MyChart or phone. If you have a critical or abnormal lab result, we will notify  "you by phone as soon as possible.  Submit refill requests through ActualSun or call your pharmacy and they will forward the refill request to us. Please allow 3 business days for your refill to be completed.          Additional Information About Your Visit        Viewglasshart Information     ActualSun lets you send messages to your doctor, view your test results, renew your prescriptions, schedule appointments and more. To sign up, go to [email or web address] . Click on \"Log in\" on the left side of the screen, which will take you to the Welcome page. Then click on \"Sign up Now\" on the right side of the page.     You will be asked to enter the access code listed below, as well as some personal information. Please follow the directions to create your username and password.     Your access code is: [email or web address]  Expires: 2017 12:54 PM     Your access code will  in 90 days. If you need help or a new code, please call your [place] clinic or [phone].        Care EveryWhere ID     This is your Care EveryWhere ID. This could be used by other organizations to access your [place] medical records  [id number]        Your Vitals Were     Pulse Temperature Pulse Oximetry BMI (Body Mass Index)          80 99.8  F (37.7  C) (Oral) 98% 48.12 kg/m2         Blood Pressure from Last 3 Encounters:   17 132/72   17 134/86   10/17/16 136/88    Weight from Last 3 Encounters:   17 276 lb (125.2 kg)   17 276 lb (125.2 kg)   10/17/16 278 lb (126.1 kg)              We Performed the Following     Basic metabolic panel     CBC with platelets differential     EKG 12-lead complete w/read - Clinics     Magnesium     TSH with free T4 reflex     Zio Patch 48 Hours        Primary Care Provider Office Phone # Fax #    Deisy Sharpe [phone] [address] [facility] [place] [address]        Equal Access to Services     EDUARD CAMPBELL AH: Jimmie Hale, odalys pretty, qaybta " nelson fairchild sonalnadine judith tavares ah. So [facility] [phone].    ATENCIÓN: Si scar alvarenga, tiene a perkins disposición servicios gratuitos de asistencia lingüística. Ancelmo al [phone].    We comply with applicable federal civil rights laws and [place] laws. We do not discriminate on the basis of race, color, national origin, age, disability sex, sexual orientation or gender identity.            Thank you!     Thank you for choosing [facility]  for your care. Our goal is always to provide you with excellent care. Hearing back from our patients is one way we can continue to improve our services. Please take a few minutes to complete the written survey that you may receive in the mail after your visit with us. Thank you!             Your Updated Medication List - Protect others around you: Learn how to safely use, store and throw away your medicines at www.disposemymeds.org.          This list is accurate as of: 8/16/17  1:32 PM.  Always use your most recent med list.                   Brand Name Dispense Instructions for use Diagnosis    calcium + D 600-200 MG-UNIT Tabs   Generic drug:  calcium carbonate-vitamin D      1 TABLET TWICE  DAILY        * fluticasone 50 MCG/ACT spray    FLONASE    3 Bottle    Spray 2 sprays into both nostrils daily    Chronic rhinitis       * fluticasone 50 MCG/ACT spray    FLONASE    16 g    Spray 2 sprays into both nostrils daily    PND (post-nasal drip)       GLUCOSAMINE CHONDROITIN Tabs      1 TABLET DAILY        hydrOXYzine 25 MG tablet    ATARAX     Take 25 mg by mouth 3 times daily as needed for itching        magnesium oxide 400 MG Caps     270 capsule    Take 1 tablet by mouth 3 times daily    Hypomagnesemia       meloxicam 15 MG tablet    MOBIC    90 tablet    TAKE 1 TABLET BY MOUTH EVERY DAY.    Spinal stenosis, unspecified spinal region       MULTI-VITAMIN PO      None Entered        * order for DME     1 Device    Equipment being ordered:  compression stocking joey    Edema       * order for DME     1 Device    Equipment being ordered: [id number] Thumb Splint, CMC RT, MD  [id number] Thumb splint, CMC, LT MD    Primary osteoarthritis of first carpometacarpal joint of right hand, Primary osteoarthritis of first carpometacarpal joint of left hand       SYNTHROID 50 MCG tablet   Generic drug:  levothyroxine     90 tablet    Take 1 tablet (50 mcg) by mouth daily    Acquired hypothyroidism       VITAMIN C PO      Take 500 mg by mouth daily        vitamin D 1000 UNITS capsule     180 capsule    Take 2,000 Units by mouth daily    Hypovitaminosis D       VITAMIN E PO      1 TABLET DAILY        * Notice:  This list has 4 medication(s) that are the same as other medications prescribed for you. Read the directions carefully, and ask your doctor or other care provider to review them with you.

## 2017-08-17 NOTE — PROGRESS NOTES
Dear Rozina,    Your recent test results are attached.      Normal thyroid.  Normal kidney function and electrolytes.  Normal magnesium.    If you have any questions please feel free to contact [phone] or myself via Inkventorst.    Sincerely,  Betty Porter, CNP

## 2017-08-18 ENCOUNTER — TELEPHONE (OUTPATIENT)
Dept: PALLIATIVE MEDICINE | Facility: CLINIC | Age: 67
End: 2017-08-18

## 2017-08-18 NOTE — TELEPHONE ENCOUNTER
Pre-screening Questions for Radiology Injections:    Injection to be done at which interventional clinic site? [facility] [place]    Procedure ordered by Dr. Mckeon    Procedure ordered? Lumbar Epidural Steroid Injection    What insurance would patient like us to bill for this procedure? BCBS and Medicare      Worker's comp-Any injection DO NOT SCHEDULE and route to Fatmata Espinoza.      [employer] insurance - For SI joint injections, DO NOT SCHEDULE and route Lucina Mondragon.      [employer]- MBB's must be scheduled at LEAST two weeks apart      [employer] - Any injection besides hip/shoulder/knee joint DO NOT SCHEDULE and route to Lucina Mondragon. She will obtain PA and call pt back to schedule procedure or notify pt of denial.     HP CIGNA-PA REQUIRED FOR NON-GRACIE OR Joint injections    Any chance of pregnancy? NO   If YES, do NOT schedule and route to RN pool    Is an  needed? No     Patient has a drive home? (mandatory) YES: INFORMED    Is patient taking any blood thinners (plavix, coumadin, jantoven, warfarin, heparin, pradaxa or dabigatran )? No   If hold needed, do NOT schedule, route to RN pool     Is patient taking any aspirin products? No     If more than 325mg/day do NOT schedule; route to RN pool     For CERVICAL procedures, hold all aspirin products for 6 days.      Does the patient have a bleeding or clotting disorder? No     If yes, okay to schedule AND route to RN nurse pool    **For any patients with platelet count <100, must be forwarded to provider**    Is patient diabetic?  No  If YES, have them bring their glucometer.    Does patient have an active infection or treated for one within the past week? No     Is patient currently taking any antibiotics?  No     For patients on chronic, preventative, or prophylactic antibiotics, procedures may be scheduled.     For patients on antibiotics for active or recent infection:    Kirk Owen, Javi Yap-antibiotic  course must have been completed for 4 days    Dolores Crain-antibiotic course must have been completed for 7 days    Is patient currently taking any steroid medications? (i.e. Prednisone, Medrol)  No     For patients on steroid medications:    Kirk Owen [place], [place]-steroid course must have been completed for 4 days    Dolores Crain-steroid course must have been completed for 7 days    Reviewed with patient:  If you are started on any steroids or antibiotics between now and your appointment, you must contact us because it may affect our ability to perform your procedure.  Yes    Is patient actively being treated for cancer or immunocompromised, including the spleen having been removed? No    If YES, do NOT schedule and route to RN pool     **For Dr. Harris patients without spleens should have the chart sent to her**    Are you able to get on and off an exam table with minimal or no assistance? Yes  If NO, do NOT schedule and route to RN pool    Are you able to roll over and lay on your stomach with minimal or no assistance? Yes  If NO, do NOT schedule and route to RN pool     Any allergies to contrast dye, iodine, shellfish, or numbing and steroid medications? No  If YES, route to RN pool AND add allergy information to appointment notes    Allergies: Amitriptyline; Celebrex [celecoxib]; Epinephrine-lidocaine-na metabisulfite; Latex; Morphine; Penicillins; Sulfa drugs; Ultram [tramadol hcl]; Yellow dye; and Zithromax [azithromycin]        Has the patient had a flu shot or any other vaccinations within 7 days before or after the procedure.  No       Does patient have an MRI/CT?  YES: 7/2017  (SI joint, hip injections, lumbar sympathetic blocks, and stellate ganglion blocks do not require an MRI)    Was the MRI done w/in the last 3 years?  Yes    Was MRI done at [facility]? Yes      If not, where was it done? N/A       If MRI was not done at [facility], [facility] or [facility] do NOT schedule  and route to nursing.  If pt has an imaging disc, the injection may be scheduled but pt has to bring disc to appt. If they show up w/out disc the injection cannot be done    Reminders (please tell patient if applicable):       Instructed pt to arrive 30 minutes early for IV start if this is for a cervical procedure, ALL sympathetic (stellate ganglion, hypogastric, or lumbar sympathetic block) and all sedation procedures (RFA, spinal cord stimulation trials).  Not Applicable    -IVs are not routinely placed for [place] and [place] cervical case       If NPO for sedation, informed patient that it is okay to take medications with sips of water (except if they are to hold blood thinners).  Not Applicable   *DO take blood pressure medication if it is prescribed*      If this is for a cervical GRACIE, informed patient that aspirin needs to be held for 6 days.   Not Applicable      For all patients not having spinal cord stimulator (SCS) trials or radiofrequency ablations (RFAs), informed patient:  IV sedation is not provided for this procedure.  If you feel that an oral anti-anxiety medication is needed, you can discuss this further with your referring provider or primary care provider.  The Pain Clinic provider will discuss specifics of what the procedure includes at your appointment.  Most procedures last 10-20 minutes.  We use numbing medications to help with any discomfort during the procedure.  NO      Do not schedule procedures requiring IV placement in the first appointment of the day or first appointment after lunch.       For patients 85 or older we recommend having an adult stay w/ them for the remainder of the day.       Does the patient have any questions?  NO  Rhonda Summers  [facility]

## 2017-08-18 NOTE — TELEPHONE ENCOUNTER
Incoming fax from [facility]. [facility] for aleah SAMUEL. Routing to scheduling coordinators to schedule.    Nasima Farmington    [facility]

## 2017-08-22 NOTE — TELEPHONE ENCOUNTER
8/22/2017 at 2:13pm    Incoming fax from [facility] for the same order. LESI has already been scheduled for Thursday 9-.         Nasima Karnack    [facility] [place]

## 2017-08-28 NOTE — PROGRESS NOTES
Dear Rozina,    Your recent test results are attached.      Normal Holter monitor.    If you have any questions please feel free to contact [phone] or myself via [email or web address].    Sincerely,  Betty Porter, CNP

## 2017-10-02 ENCOUNTER — TELEPHONE (OUTPATIENT)
Dept: INTERNAL MEDICINE | Facility: CLINIC | Age: 67
End: 2017-10-02

## 2017-10-02 NOTE — TELEPHONE ENCOUNTER
Called patient and informed of getting her ppsv23. And will do all this when she comes in on her physical on 11/02/2017.    Sabine DUARTE CMA ([facility])

## 2017-10-02 NOTE — TELEPHONE ENCOUNTER
Reason for Call:  Other call back    Detailed comments: Patient stating she received her first pneumonia injection last year and is wondering if she needs a booster or second shot this year. Please contact patient to discuss further.    Phone Number Patient can be reached at: Home number on file [phone] (home)    Best Time: any time    Can we leave a detailed message on this number? YES    Call taken on 10/2/2017 at 1:29 PM by America Oliver

## 2017-10-02 NOTE — TELEPHONE ENCOUNTER
MA- please check vaccination schedule/records and call patient. Does not look like she received the PPSV23 yet?    Jose M Chaparro RN

## 2017-10-06 ENCOUNTER — ALLIED HEALTH/NURSE VISIT (OUTPATIENT)
Dept: NURSING | Facility: CLINIC | Age: 67
End: 2017-10-06
Payer: COMMERCIAL

## 2017-10-06 DIAGNOSIS — Z23 NEED FOR PROPHYLACTIC VACCINATION AND INOCULATION AGAINST INFLUENZA: Primary | ICD-10-CM

## 2017-10-06 PROCEDURE — G0008 ADMIN INFLUENZA VIRUS VAC: HCPCS

## 2017-10-06 PROCEDURE — 99207 ZZC NO CHARGE NURSE ONLY: CPT

## 2017-10-06 PROCEDURE — 90662 IIV NO PRSV INCREASED AG IM: CPT

## 2017-10-06 NOTE — MR AVS SNAPSHOT
After Visit Summary   10/6/2017    Rozina Bell    MRN: [id number]           Patient Information     Date Of Birth          1950        Visit Information        Provider Department      10/6/2017 10:15 AM FZ ANCILLARY [facility]        Today's Diagnoses     Need for prophylactic vaccination and inoculation against influenza    -  1       Follow-ups after your visit        Your next 10 appointments already scheduled     Oct 30, 2017  9:30 AM CDT   LAB with FZ LAB   [facility] ([facility])    [address] [facility] [id number] [phone]           Patient must bring picture ID. Patient should be prepared to give a urine specimen  Please do not eat 10-12 hours before your appointment if you are coming in fasting for labs on lipids, cholesterol, or glucose (sugar). Pregnant women should follow their Care Team instructions. Water with medications is okay. Do not drink coffee or other fluids. If you have concerns about taking  your medications, please ask at office or if scheduling via Safendt, send a message by clicking on Secure Messaging, Message Your Care Team.            Nov 06, 2017 11:00 AM CST   PHYSICAL with Deisy Sharpe MD   [facility] ([facility])    [address] [address] [phone]            Nov 15, 2017 10:15 AM CST   Radiology Injections with Pam Yap MD   [facility] ([facility])    [address]  [place] [id number] [phone]              Who to contact     If you have questions or need follow up information about today's clinic visit or your schedule please contact [facility] directly at [phone].  Normal or non-critical lab and imaging results will be communicated to you by MyChart, letter or phone within 4 business days after the clinic has received the results. If you do not hear  "from us within 7 days, please contact the clinic through [email or web address] or phone. If you have a critical or abnormal lab result, we will notify you by phone as soon as possible.  Submit refill requests through [email or web address] or call your pharmacy and they will forward the refill request to us. Please allow 3 business days for your refill to be completed.          Additional Information About Your Visit        PiczoharEVRGR Information     [email or web address] lets you send messages to your doctor, view your test results, renew your prescriptions, schedule appointments and more. To sign up, go to [email or web address] . Click on \"Log in\" on the left side of the screen, which will take you to the Welcome page. Then click on \"Sign up Now\" on the right side of the page.     You will be asked to enter the access code listed below, as well as some personal information. Please follow the directions to create your username and password.     Your access code is: [email or web address]  Expires: 2018 10:22 AM     Your access code will  in 90 days. If you need help or a new code, please call your [place] clinic or [phone].        Care EveryWhere ID     This is your Care EveryWhere ID. This could be used by other organizations to access your [place] medical records  [id number]         Blood Pressure from Last 3 Encounters:   17 132/72   17 134/86   10/17/16 136/88    Weight from Last 3 Encounters:   17 276 lb (125.2 kg)   17 276 lb (125.2 kg)   10/17/16 278 lb (126.1 kg)              We Performed the Following     FLU VACCINE, INCREASED ANTIGEN, PRESV FREE, AGE 65+ [13598]     Vaccine Administration, Initial [07043]        Primary Care Provider Office Phone # Fax #    Deisy Sharpe [phone] [address] [place] [address]        Equal Access to Services     [facility] : Hadii aad ku hadasho Soomaali, waaxda luqadaha, qaybta shanialnelson tenorio. " So [facility] [phone].    ATENCIÓN: Si scar alvarenga, tiene a perkins disposición servicios gratuitos de asistencia lingüística. Ancelmo sommers [phone].    We comply with applicable federal civil rights laws and [place] laws. We do not discriminate on the basis of race, color, national origin, age, disability, sex, sexual orientation, or gender identity.            Thank you!     Thank you for choosing [facility]  for your care. Our goal is always to provide you with excellent care. Hearing back from our patients is one way we can continue to improve our services. Please take a few minutes to complete the written survey that you may receive in the mail after your visit with us. Thank you!             Your Updated Medication List - Protect others around you: Learn how to safely use, store and throw away your medicines at www.disposemymeds.org.          This list is accurate as of: 10/6/17 10:22 AM.  Always use your most recent med list.                   Brand Name Dispense Instructions for use Diagnosis    calcium + D 600-200 MG-UNIT Tabs   Generic drug:  calcium carbonate-vitamin D      1 TABLET TWICE  DAILY        * fluticasone 50 MCG/ACT spray    FLONASE    3 Bottle    Spray 2 sprays into both nostrils daily    Chronic rhinitis       * fluticasone 50 MCG/ACT spray    FLONASE    16 g    Spray 2 sprays into both nostrils daily    PND (post-nasal drip)       GLUCOSAMINE CHONDROITIN Tabs      1 TABLET DAILY        hydrOXYzine 25 MG tablet    ATARAX     Take 25 mg by mouth 3 times daily as needed for itching        magnesium oxide 400 MG Caps     270 capsule    Take 1 tablet by mouth 3 times daily    Hypomagnesemia       meloxicam 15 MG tablet    MOBIC    90 tablet    TAKE 1 TABLET BY MOUTH EVERY DAY.    Spinal stenosis, unspecified spinal region       MULTI-VITAMIN PO      None Entered        * order for DME     1 Device    Equipment being ordered: compression stocking joey    Edema       * order for DME      1 Device    Equipment being ordered: [id number] Thumb Splint, CMC MD ROSARIO  [id number] Thumb splint, CMC, LT MD    Primary osteoarthritis of first carpometacarpal joint of right hand, Primary osteoarthritis of first carpometacarpal joint of left hand       SYNTHROID 50 MCG tablet   Generic drug:  levothyroxine     90 tablet    Take 1 tablet (50 mcg) by mouth daily    Acquired hypothyroidism       VITAMIN C PO      Take 500 mg by mouth daily        vitamin D 1000 UNITS capsule     180 capsule    Take 2,000 Units by mouth daily    Hypovitaminosis D       VITAMIN E PO      1 TABLET DAILY        * Notice:  This list has 4 medication(s) that are the same as other medications prescribed for you. Read the directions carefully, and ask your doctor or other care provider to review them with you.

## 2017-10-06 NOTE — NURSING NOTE
Prior to injection verified patient identity using patient's name and date of birth.  Kayleigh ANTHONY CMA ([facility])

## 2017-10-11 ENCOUNTER — DOCUMENTATION ONLY (OUTPATIENT)
Dept: LAB | Facility: CLINIC | Age: 67
End: 2017-10-11

## 2017-10-11 DIAGNOSIS — Z13.6 CARDIOVASCULAR SCREENING; LDL GOAL LESS THAN 160: Primary | ICD-10-CM

## 2017-10-11 NOTE — PROGRESS NOTES
I recently did a bunch of labs so I only see cholesterol.  Is that what she was wanting?  Dr. Sharpe

## 2017-10-13 ENCOUNTER — TRANSFERRED RECORDS (OUTPATIENT)
Dept: HEALTH INFORMATION MANAGEMENT | Facility: CLINIC | Age: 67
End: 2017-10-13

## 2017-10-30 DIAGNOSIS — Z13.6 CARDIOVASCULAR SCREENING; LDL GOAL LESS THAN 160: ICD-10-CM

## 2017-10-30 LAB
CHOLEST SERPL-MCNC: 193 MG/DL
HDLC SERPL-MCNC: 65 MG/DL
LDLC SERPL CALC-MCNC: 113 MG/DL
NONHDLC SERPL-MCNC: 128 MG/DL
TRIGL SERPL-MCNC: 76 MG/DL

## 2017-10-30 PROCEDURE — 36415 COLL VENOUS BLD VENIPUNCTURE: CPT | Performed by: INTERNAL MEDICINE

## 2017-10-30 PROCEDURE — 80061 LIPID PANEL: CPT | Performed by: INTERNAL MEDICINE

## 2017-10-30 NOTE — LETTER
[id number] [facility] [place] [address]    October 31, 2017    Rozina Bell   [address] [address]  [facility] [address]          Dear Alebrt Handy.   Enclosed is a copy of your results.     Results for orders placed or performed in visit on 10/30/17   **Lipid panel reflex to direct LDL FUTURE anytime   Result Value Ref Range    Cholesterol 193 <200 mg/dL    Triglycerides 76 <150 mg/dL    HDL Cholesterol 65 >49 mg/dL    LDL Cholesterol Calculated 113 (H) <100 mg/dL    Non HDL Cholesterol 128 <130 mg/dL       If you have any questions or concerns, please call myself or my nurse at [phone].      Sincerely,        Betty Porter, CNP /pb

## 2017-10-30 NOTE — PROGRESS NOTES
Dear Rozina,    Your recent test results are attached.      Good cholesterol.    If you have any questions please feel free to contact [phone] or myself via [employer].    Sincerely,  Betty Porter, CNP

## 2017-11-06 ENCOUNTER — RADIANT APPOINTMENT (OUTPATIENT)
Dept: GENERAL RADIOLOGY | Facility: CLINIC | Age: 67
End: 2017-11-06
Attending: INTERNAL MEDICINE
Payer: COMMERCIAL

## 2017-11-06 ENCOUNTER — OFFICE VISIT (OUTPATIENT)
Dept: INTERNAL MEDICINE | Facility: CLINIC | Age: 67
End: 2017-11-06
Payer: COMMERCIAL

## 2017-11-06 VITALS
DIASTOLIC BLOOD PRESSURE: 88 MMHG | OXYGEN SATURATION: 100 % | SYSTOLIC BLOOD PRESSURE: 136 MMHG | WEIGHT: 271 LBS | HEART RATE: 84 BPM | TEMPERATURE: 98.8 F | BODY MASS INDEX: 47.25 KG/M2

## 2017-11-06 DIAGNOSIS — Z23 NEED FOR PNEUMOCOCCAL VACCINATION: ICD-10-CM

## 2017-11-06 DIAGNOSIS — Z71.89 ADVANCED DIRECTIVES, COUNSELING/DISCUSSION: ICD-10-CM

## 2017-11-06 DIAGNOSIS — M48.00 SPINAL STENOSIS, UNSPECIFIED SPINAL REGION: ICD-10-CM

## 2017-11-06 DIAGNOSIS — E03.9 ACQUIRED HYPOTHYROIDISM: ICD-10-CM

## 2017-11-06 DIAGNOSIS — E03.9 HYPOTHYROIDISM, UNSPECIFIED TYPE: ICD-10-CM

## 2017-11-06 DIAGNOSIS — Z12.11 SCREEN FOR COLON CANCER: ICD-10-CM

## 2017-11-06 DIAGNOSIS — Z00.00 MEDICARE ANNUAL WELLNESS VISIT, SUBSEQUENT: Primary | ICD-10-CM

## 2017-11-06 DIAGNOSIS — M25.552 HIP PAIN, LEFT: ICD-10-CM

## 2017-11-06 DIAGNOSIS — M79.7 FIBROMYALGIA: ICD-10-CM

## 2017-11-06 DIAGNOSIS — I10 HYPERTENSION GOAL BP (BLOOD PRESSURE) < 140/90: ICD-10-CM

## 2017-11-06 DIAGNOSIS — E66.01 MORBID OBESITY (H): ICD-10-CM

## 2017-11-06 DIAGNOSIS — M48.061 SPINAL STENOSIS OF LUMBAR REGION, UNSPECIFIED WHETHER NEUROGENIC CLAUDICATION PRESENT: ICD-10-CM

## 2017-11-06 PROCEDURE — 99213 OFFICE O/P EST LOW 20 MIN: CPT | Mod: 25 | Performed by: INTERNAL MEDICINE

## 2017-11-06 PROCEDURE — G0438 PPPS, INITIAL VISIT: HCPCS | Performed by: INTERNAL MEDICINE

## 2017-11-06 PROCEDURE — 73502 X-RAY EXAM HIP UNI 2-3 VIEWS: CPT

## 2017-11-06 RX ORDER — LEVOTHYROXINE SODIUM 50 UG/1
50 TABLET ORAL DAILY
Qty: 90 TABLET | Refills: 3 | Status: SHIPPED | OUTPATIENT
Start: 2017-11-06 | End: 2018-11-07

## 2017-11-06 RX ORDER — MELOXICAM 15 MG/1
TABLET ORAL
Qty: 90 TABLET | Refills: 3 | Status: SHIPPED | OUTPATIENT
Start: 2017-11-06 | End: 2018-11-07

## 2017-11-06 RX ORDER — LEVOTHYROXINE SODIUM 50 MCG
50 TABLET ORAL DAILY
Qty: 90 TABLET | Refills: 3 | Status: SHIPPED | OUTPATIENT
Start: 2017-11-06 | End: 2020-11-13

## 2017-11-06 ASSESSMENT — PAIN SCALES - GENERAL: PAINLEVEL: MODERATE PAIN (4)

## 2017-11-06 NOTE — NURSING NOTE
"Chief Complaint   Patient presents with     Physical     fall risk        Initial /88  Pulse 84  Temp 98.8  F (37.1  C) (Oral)  Wt 271 lb (122.9 kg)  SpO2 100%  BMI 47.25 kg/m2 Estimated body mass index is 47.25 kg/(m^2) as calculated from the following:    Height as of 7/17/17: 5' 3.5\" (1.613 m).    Weight as of this encounter: 271 lb (122.9 kg).  Medication Reconciliation: complete   Mariola Cagle MA    "

## 2017-11-06 NOTE — MR AVS SNAPSHOT
After Visit Summary   11/6/2017    Rozina Bell    MRN: [id number]           Patient Information     Date Of Birth          1950        Visit Information        Provider Department      11/6/2017 11:00 AM Deisy Sharpe MD [facility]        Today's Diagnoses     Medicare annual wellness visit, subsequent    -  1    Hypertension goal BP (blood pressure) < 140/90        Hypothyroidism, unspecified type        Fibromyalgia        Spinal stenosis of lumbar region, unspecified whether neurogenic claudication present        Morbid obesity (H)        Screen for colon cancer        Acquired hypothyroidism        Spinal stenosis, unspecified spinal region        Need for pneumococcal vaccination        Hip pain, left        Advanced directives, counseling/discussion          Care Instructions    Complete Cologuard stool kit for colon cancer screening. This will be mailed to you.     Consider getting the new Shingles Vaccine (2 shot series) next year.    Try using Flonase as needed when you feel stuffed up.     You can also check with your Pharmacist for a sensitive nasal spray in place of Flonase.     Consider meeting with [facility] here in clinic to complete your Advance Directive.    [facility]    If you have any questions regarding to your visit please contact your care team:     Team Pink:   Clinic Hours Telephone Number   Internal Medicine:  Dr. Deisy Porter, NP       7am-7pm  Monday - Thursday   7am-5pm  Fridays  [phone]  (Appointment scheduling available 24/7)    Questions about your visit?  Team Line  [phone]   Urgent Care - [place] - 11am-9pm Monday-Friday Saturday-Sunday- 9am-5pm   [place] - 5pm-9pm Monday-Friday Saturday-Sunday- 9am-5pm  [phone] - Divya RODRÍGUEZ  [phone] - [place]       What options do I have for visits at the clinic other than the traditional office  visit?  To expand how we care for you, many of our providers are utilizing electronic visits (e-visits) and telephone visits, when medically appropriate, for interactions with their patients rather than a visit in the clinic.   We also offer nurse visits for many medical concerns. Just like any other service, we will bill your insurance company for this type of visit based on time spent on the phone with your provider. Not all insurance companies cover these visits. Please check with your medical insurance if this type of visit is covered. You will be responsible for any charges that are not paid by your insurance.      E-visits via [employer]:  generally incur a $35.00 fee.  Telephone visits:  Time spent on the phone: *charged based on time that is spent on the phone in increments of 10 minutes. Estimated cost:   5-10 mins $30.00   11-20 mins. $59.00   21-30 mins. $85.00   Use E-Duction (secure email communication and access to your chart) to send your primary care provider a message or make an appointment. Ask someone on your Team how to sign up for E-Duction.    For a Price Quote for your services, please call our [facility] [place] Line at [phone].    As always, Thank you for trusting us with your health care needs!    Discharged By: An            Follow-ups after your visit        Additional Services     [facility] REFERRAL       Your provider has referred you to Outpatient [facility] Advance Care Planning Facilitator or Serious illness clinic support staff. The facilitator or support staff will contact you to schedule the appointment or for the follow up call    Reason for Referral: Basic Advance Care Planning - 1:1 need                  Your next 10 appointments already scheduled     Nov 15, 2017 10:15 AM CST   Radiology Injections with Pam Yap MD   [facility] [place] ([facility] [place])    [address] [facility]  [place] [address] [phone]             "  Who to contact     If you have questions or need follow up information about today's clinic visit or your schedule please contact [facility] directly at [phone].  Normal or non-critical lab and imaging results will be communicated to you by MyChart, letter or phone within 4 business days after the clinic has received the results. If you do not hear from us within 7 days, please contact the clinic through MyChart or phone. If you have a critical or abnormal lab result, we will notify you by phone as soon as possible.  Submit refill requests through [employer] or call your pharmacy and they will forward the refill request to us. Please allow 3 business days for your refill to be completed.          Additional Information About Your Visit        [facility] [employer] Information     [employer]ctor lets you send messages to your doctor, view your test results, renew your prescriptions, schedule appointments and more. To sign up, go to [email or web address] . Click on \"Log in\" on the left side of the screen, which will take you to the Welcome page. Then click on \"Sign up Now\" on the right side of the page.     You will be asked to enter the access code listed below, as well as some personal information. Please follow the directions to create your username and password.     Your access code is: [email or web address]  Expires: 2018  9:22 AM     Your access code will  in 90 days. If you need help or a new code, please call your [facility] clinic or [phone].        Care EveryWhere ID     This is your Care EveryWhere ID. This could be used by other organizations to access your [facility] medical records  [id number]        Your Vitals Were     Pulse Temperature Pulse Oximetry BMI (Body Mass Index)          84 98.8  F (37.1  C) (Oral) 100% 47.25 kg/m2         Blood Pressure from Last 3 Encounters:   17 136/88   17 132/72   17 134/86    Weight from Last 3 Encounters:   17 271 lb (122.9 kg)   17 " 276 lb (125.2 kg)   07/17/17 276 lb (125.2 kg)              We Performed the Following     ADMIN MEDICARE: Pneumococcal Vaccine ()     [facility] REFERRAL     Pneumococcal vaccine 23 valent PPSV23  (Pneumovax) [50207]     XR Hip Left 2-3 Views          Today's Medication Changes          These changes are accurate as of: 11/6/17 12:05 PM.  If you have any questions, ask your nurse or doctor.               These medicines have changed or have updated prescriptions.        Dose/Directions    * SYNTHROID 50 MCG tablet   This may have changed:  Another medication with the same name was added. Make sure you understand how and when to take each.   Used for:  Acquired hypothyroidism   Generic drug:  levothyroxine   Changed by:  Deisy Sharpe MD        Dose:  50 mcg   Take 1 tablet (50 mcg) by mouth daily   Quantity:  90 tablet   Refills:  3       * levothyroxine 50 MCG tablet   Commonly known as:  SYNTHROID/LEVOTHROID   This may have changed:  You were already taking a medication with the same name, and this prescription was added. Make sure you understand how and when to take each.   Used for:  Hypothyroidism, unspecified type   Changed by:  Deisy Sharpe MD        Dose:  50 mcg   Take 1 tablet (50 mcg) by mouth daily Patient would like to see the price difference between synthroid and levothyroxine   Quantity:  90 tablet   Refills:  3       * Notice:  This list has 2 medication(s) that are the same as other medications prescribed for you. Read the directions carefully, and ask your doctor or other care provider to review them with you.      Stop taking these medicines if you haven't already. Please contact your care team if you have questions.     fluticasone 50 MCG/ACT spray   Commonly known as:  FLONASE   Stopped by:  Deisy Sharpe MD                Where to get your medicines      These medications were sent to [facility] [address] [facility] [address] [facility] [address]  [address] [facility] [place] [address]    Hours:  24-hours Phone:  [phone]     levothyroxine 50 MCG tablet    meloxicam 15 MG tablet    SYNTHROID 50 MCG tablet                Primary Care Provider Office Phone # Fax #    Deisy Sharpe [phone] [address] [facility] [address]  [place] [address]        Equal Access to Services     [facility] Center[facility] : Hadii aad ku hadasho Soomaali, waaxda luqadaha, qaybta kaalmada adeegyada, waxay idiin hayaan adeeg khtobin lawing . So [facility] [phone].    ATENCIÓN: Si scar alvarenga, tiene a perkins disposición servicios gratuitos de asistencia lingüística. [facility] [phone].    We comply with applicable federal civil rights laws and [place] laws. We do not discriminate on the basis of race, color, national origin, age, disability, sex, sexual orientation, or gender identity.            Thank you!     Thank you for choosing [facility]  for your care. Our goal is always to provide you with excellent care. Hearing back from our patients is one way we can continue to improve our services. Please take a few minutes to complete the written survey that you may receive in the mail after your visit with us. Thank you!             Your Updated Medication List - Protect others around you: Learn how to safely use, store and throw away your medicines at www.disposemymeds.org.          This list is accurate as of: 11/6/17 12:05 PM.  Always use your most recent med list.                   Brand Name Dispense Instructions for use Diagnosis    calcium + D 600-200 MG-UNIT Tabs   Generic drug:  calcium carbonate-vitamin D      1 TABLET TWICE  DAILY        GLUCOSAMINE CHONDROITIN Tabs      1 TABLET DAILY        hydrOXYzine 25 MG tablet    ATARAX     Take 25 mg by mouth 3 times daily as needed for itching        magnesium oxide 400 MG Caps     270 capsule    Take 1 tablet by mouth 3 times daily    Hypomagnesemia       meloxicam 15 MG tablet     MOBIC    90 tablet    TAKE 1 TABLET BY MOUTH EVERY DAY.    Spinal stenosis, unspecified spinal region       MULTI-VITAMIN PO      None Entered        * order for DME     1 Device    Equipment being ordered: compression stocking joey    Edema       * order for DME     1 Device    Equipment being ordered: [id number] Thumb Splint, CMC RT, MD  [id number] Thumb splint, CMC, LT MD    Primary osteoarthritis of first carpometacarpal joint of right hand, Primary osteoarthritis of first carpometacarpal joint of left hand       * SYNTHROID 50 MCG tablet   Generic drug:  levothyroxine     90 tablet    Take 1 tablet (50 mcg) by mouth daily    Acquired hypothyroidism       * levothyroxine 50 MCG tablet    SYNTHROID/LEVOTHROID    90 tablet    Take 1 tablet (50 mcg) by mouth daily Patient would like to see the price difference between synthroid and levothyroxine    Hypothyroidism, unspecified type       VITAMIN C PO      Take 500 mg by mouth daily        vitamin D 1000 UNITS capsule     180 capsule    Take 2,000 Units by mouth daily    Hypovitaminosis D       VITAMIN E PO      1 TABLET DAILY        * Notice:  This list has 4 medication(s) that are the same as other medications prescribed for you. Read the directions carefully, and ask your doctor or other care provider to review them with you.

## 2017-11-06 NOTE — PROGRESS NOTES
INTERNAL MEDICINE  SUBJECTIVE:   Rozina Bell is a 67 year old female who presents for Preventive Visit.  Are you in the first 12 months of your Medicare coverage?  No    Physical   Annual:     Getting at least 3 servings of Calcium per day::  Yes    Bi-annual eye exam::  Yes    Dental care twice a year::  Yes    Sleep apnea or symptoms of sleep apnea::  None    Diet::  Regular (no restrictions)    Frequency of exercise::  None    Taking medications regularly::  Yes    Medication side effects::  None and Lightheadedness    COGNITIVE SCREEN  1) Repeat 3 items (Banana, Sunrise, Chair)    2) Clock draw: NORMAL  3) 3 item recall: Recalls 3 objects  Results: 3 items recalled: COGNITIVE IMPAIRMENT LESS LIKELY  Mini-CogTM Copyright S Liliane. Licensed by the author for use in [employer] reprinted with permission ([email or web address]). All rights reserved.      Answers for HPI/ROS submitted by the patient on 11/6/2017   PHQ-2 Score: 0      Heart palpitations - She is still having heart palpitations. Prior to the palpitation she gets a hot feeling, palpitation occurs, and then she gets a ice cold feeling down her back. Palpitations last less than a minute. Patient does not think she is having hot flashes because she had them in the past with surgical menopause and symptoms presented differently.     Blood pressure - Patient is drinking two cups of coffee on a regular basis, sometimes up to four cups of coffee a day. Denies feeling anxious. Her heart palpitations occur without the coffee.     BP Readings from Last 3 Encounters:   11/06/17 136/88   08/16/17 132/72   07/17/17 134/86       Weight - She loosely follows her weight. Even eating little amounts she puentes weight. She does not eat candy or chocolate.    Nasal congestion - Patient is not taking Flonase due to scabs in her nostrils. She does not know what else to try because she knows she is going to get sinus infections without it. She feels stuffed up and  "has nasal congestion. Even using it every other day does not work.  She has been using Airgel with aloe to prevent nasal dryness. Flonase helped her breath better.    Back pain - She is considering having another epidural injection in her back next week. Her pain is slightly improved with the injections. Sometimes the injection does not benefit her. The last injection she had paralyzed her for seven hours.    Left hip pain - Her left hip joint is extremely painful, but she does not want to have a hip replacement. Occasionally pain radiates down her leg.    OB/GYN - She is seeing Dr. Mckay every other year. Patient had a mammogram on 10/13/17 as ordered by her and findings were as follows:  \"Findings: Ultrasound evaluation of the right breast was performed.   Small cyst upper outer portion right breast. Specifically, at the   10:00 position 10 cm from the nipple, there is a cyst measuring 7 mm   x 6 mm x 11 mm in size.\"      Wt Readings from Last 5 Encounters:   11/06/17 122.9 kg (271 lb)   08/16/17 125.2 kg (276 lb)   07/17/17 125.2 kg (276 lb)   10/17/16 126.1 kg (278 lb)   06/07/16 133.4 kg (294 lb)     Reviewed and updated as needed this visit by clinical staff  Tobacco  Allergies  Meds  Problems  Med Hx  Surg Hx  Fam Hx  Soc Hx        Reviewed and updated as needed this visit by Provider  Allergies  Meds  Problems        Social History   Substance Use Topics     Smoking status: Never Smoker     Smokeless tobacco: Never Used      Comment: Lives in smoke free household     Alcohol use No     The patient does not drink >3 drinks per day nor >7 drinks per week.    Today's PHQ-2 Score:   PHQ-2 ( 1999 Pf[employer]) 11/6/2017   Q1: Little interest or pleasure in doing things 0   Q2: Feeling down, depressed or hopeless 0   PHQ-2 Score 0   Q1: Little interest or pleasure in doing things Not at all   Q2: Feeling down, depressed or hopeless Not at all   PHQ-2 Score 0     Do you feel safe in your environment - Yes    Do " you have a Health Care Directive?: No: Advance care planning was reviewed with patient; patient declined at this time.    Current providers sharing in care for this patient include: Patient Care Team:  Deisy Sharpe MD as PCP - Jesse Arango MD as MD (Specialist)  Emmanuel Max MD as MD (Orthopedics)      Hearing impairment: No    Ability to successfully perform activities of daily living: Yes, no assistance needed     Fall risk:  Fallen 2 or more times in the past year?: No  Any fall with injury in the past year?: No    Home safety:  throw rugs in the hallway    The following health maintenance items are reviewed in Epic and correct as of today:  Health Maintenance   Topic Date Due     ADVANCE DIRECTIVE PLANNING Q5 YRS  11/02/2017     FALL RISK ASSESSMENT  10/17/2017     FIT Q1 YR  11/08/2017     PNEUMOCOCCAL (2 of 2 - PPSV23) 12/09/2017     EYE EXAM Q1 YEAR  06/19/2018     TSH Q1 YEAR  08/16/2018     DEXA Q2 YR  11/22/2018     MAMMO SCREEN Q2 YR (SYSTEM ASSIGNED)  10/13/2019     TETANUS IMMUNIZATION (SYSTEM ASSIGNED)  08/08/2022     LIPID SCREEN Q5 YR FEMALE (SYSTEM ASSIGNED)  10/30/2022     INFLUENZA VACCINE (SYSTEM ASSIGNED)  Completed     HEPATITIS C SCREENING  Completed     Labs reviewed in EPIC    Pneumonia Vaccine:Adults age 65+ who have not received previous Pneumovax (PPSV23) or PCV13 as an adult: Should first be given PCV13 AND then should be given PPSV23 6-12 months after PCV13  Mammogram Screening: Patient over age 50, mutual decision to screen reflected in health maintenance.  History of abnormal Pap smear: NO - age 66 - see link Cervical Cytology Screening Guidelines Status post benign hysterectomy. Health Maintenance and Surgical History updated.    Review of Systems   ROS: 10 point ROS neg other than the symptoms noted above in the HPI.    This document serves as a record of the services and decisions personally performed and made by Deisy Sharpe MD. It was  "created on his/her behalf by Bere Brock, trained medical scribe. The creation of this document is based the provider's statements to the medical scribes.    Merle Brock 11:23 AM, November 6, 2017  OBJECTIVE:   /88  Pulse 84  Temp 98.8  F (37.1  C) (Oral)  Wt 122.9 kg (271 lb)  SpO2 100%  BMI 47.25 kg/m2 Estimated body mass index is 47.25 kg/(m^2) as calculated from the following:    Height as of 7/17/17: 1.613 m (5' 3.5\").    Weight as of this encounter: 122.9 kg (271 lb).  Physical Exam  GENERAL APPEARANCE: healthy, alert and no distress  EYES: Eyes grossly normal to inspection, PERRL and conjunctivae and sclerae normal  HENT: ear canals and TM's normal, nose and mouth without ulcers or lesions, oropharynx clear and oral mucous membranes moist  NECK: no adenopathy, no asymmetry, masses, or scars and thyroid normal to palpation  RESP: lungs clear to auscultation - no rales, rhonchi or wheezes  CV: regular rate and rhythm, normal S1 S2, no S3 or S4, no murmur, click or rub, no peripheral edema and peripheral pulses strong  ABDOMEN: soft, nontender, no hepatosplenomegaly, no masses and bowel sounds normal  MS: no musculoskeletal defects are noted and gait is age appropriate without ataxia.  Pain to palpation on the inferior aspect of left gluteal muscle, pain to palpation on other lumbar spine with radiation of the pain down the left leg.   SKIN: no suspicious lesions or rashes  NEURO: Normal strength and tone, sensory exam grossly normal, mentation intact and speech normal  PSYCH: mentation appears normal and affect normal/bright  ASSESSMENT / PLAN:   1. Medicare annual wellness visit, subsequent  She will complete ColoGuard stool kit. Mammo completed. Health maintenance utd    2. Hypertension goal BP (blood pressure) < 140/90  Controlled.     3. Hypothyroidism, unspecified type  Stable. The current medical regimen is effective;  continue present plan and medications.  - levothyroxine " "(SYNTHROID/LEVOTHROID) 50 MCG tablet; Take 1 tablet (50 mcg) by mouth daily Patient would like to see the price difference between synthroid and levothyroxine  Dispense: 90 tablet; Refill: 3    4. Fibromyalgia  Stable.    5. Spinal stenosis of lumbar region, unspecified whether neurogenic claudication present  Stable. Pt is considering having another epidural injection. The current medical regimen is effective;  continue present plan and medications.  - meloxicam (MOBIC) 15 MG tablet; TAKE 1 TABLET BY MOUTH EVERY DAY.  Dispense: 90 tablet; Refill: 3    6. Morbid obesity (H)  Discussed diet and exercise. She has made small dietary changes and is seeing a steady decrease in her weight.    7. Screen for colon cancer  She will complete ColoGuard stool test.    8. Need for pneumococcal vaccination    - Pneumococcal vaccine 23 valent PPSV23  (Pneumovax) [77215]  - ADMIN MEDICARE: Pneumococcal Vaccine ()    9. Hip pain, left  Will do XR today.  Unclear if pain is coming from her hip or back.  - XR Hip Left 2-3 Views    10. Advanced directives, counseling/discussion    - [facility] REFERRAL      End of Life Planning:  Patient currently has an advanced directive: No.  I have verified the patient's ablity to prepare an advanced directive/make health care decisions.  Literature was provided to assist patient in preparing an advanced directive.    COUNSELING:  Reviewed preventive health counseling, as reflected in patient instructions       Regular exercise       Healthy diet/nutrition       Immunizations    Vaccinated for: Pneumococcal         Colon cancer screening       Advanced Planning   Estimated body mass index is 47.25 kg/(m^2) as calculated from the following:    Height as of 7/17/17: 1.613 m (5' 3.5\").    Weight as of this encounter: 122.9 kg (271 lb).  Weight management plan: Discussed healthy diet and exercise guidelines and patient will follow up in 12 months in clinic to re-evaluate.   reports that she " has never smoked. She has never used smokeless tobacco.    Appropriate preventive services were discussed with this patient, including applicable screening as appropriate for cardiovascular disease, diabetes, osteopenia/osteoporosis, and glaucoma.  As appropriate for age/gender, discussed screening for colorectal cancer, prostate cancer, breast cancer, and cervical cancer. Checklist reviewing preventive services available has been given to the patient.    Reviewed patients plan of care and provided an AVS. The Intermediate Care Plan ( asthma action plan, low back pain action plan, and migraine action plan) for Rozina meets the Care Plan requirement. This Care Plan has been established and reviewed with the Patient.    Counseling Resources:  ATP IV Guidelines  Pooled Cohorts Equation Calculator  Breast Cancer Risk Calculator  FRAX Risk Assessment  ICSI Preventive Guidelines  Dietary Guidelines for Americans, 2010  [employer] MyPlate  ASA Prophylaxis  Lung CA Screening    Patient Instructions   Complete Cologuard stool kit for colon cancer screening. This will be mailed to you.     Consider getting the new Shingles Vaccine (2 shot series) next year.    Try using Flonase as needed when you feel stuffed up.     You can also check with your Pharmacist for a sensitive nasal spray in place of Flonase.     Consider meeting with Geno Ireland here in clinic to complete your Advance Directive.      I spent 31 minutes of time with the patient and >50% of it was in education and counseling regarding preventive health.     The information in this document, created by the medical scribe for me, accurately reflects the services I personally performed and the decisions made by me. I have reviewed and approved this document for accuracy prior to leaving the patient care area.  Deisy Sharpe MD  11:23 AM, 11/06/17    Deisy Sharpe MD  [facility]    Start 11:23 AM  End 11:54 AM

## 2017-11-06 NOTE — PATIENT INSTRUCTIONS
Complete Cologuard stool kit for colon cancer screening. This will be mailed to you.     Consider getting the new Shingles Vaccine (2 shot series) next year.    Try using Flonase as needed when you feel stuffed up.     You can also check with your Pharmacist for a sensitive nasal spray in place of Flonase.     Consider meeting with [facility] here in clinic to complete your Advance Directive.    [facility]    If you have any questions regarding to your visit please contact your care team:     Team Pink:   Clinic Hours Telephone Number   Internal Medicine:  Dr. Deisy Porter, NP       7am-7pm  Monday - Thursday   7am-5pm  Fridays  [phone]  (Appointment scheduling available 24/7)    Questions about your visit?  Team Line  [phone]   Urgent Care - [place] and [facility] - 11am-9pm Monday-Friday Saturday-Sunday- 9am-5pm   [place] - 5pm-9pm Monday-Friday Saturday-Sunday- 9am-5pm  [phone] - [facility]  [phone] - [place]       What options do I have for visits at the clinic other than the traditional office visit?  To expand how we care for you, many of our providers are utilizing electronic visits (e-visits) and telephone visits, when medically appropriate, for interactions with their patients rather than a visit in the clinic.   We also offer nurse visits for many medical concerns. Just like any other service, we will bill your insurance company for this type of visit based on time spent on the phone with your provider. Not all insurance companies cover these visits. Please check with your medical insurance if this type of visit is covered. You will be responsible for any charges that are not paid by your insurance.      E-visits via [email or web address]:  generally incur a $35.00 fee.  Telephone visits:  Time spent on the phone: *charged based on time that is spent on the phone in increments of 10 minutes. Estimated cost:   5-10 mins  $30.00   11-20 mins. $59.00   21-30 mins. $85.00   Use [employer] (secure email communication and access to your chart) to send your primary care provider a message or make an appointment. Ask someone on your Team how to sign up for [employer].    For a Price Quote for your services, please call our [employer] Price Line at [phone].    As always, Thank you for trusting us with your health care needs!    Discharged By: Lavern

## 2017-11-06 NOTE — LETTER
[id number] 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  On exam it didn't seem like this was were your pain was coming from, however. There really are 2 options at this point.  You can do an injection in the sacroiliac joint to see if this helps (I am doubtful it will) or look in to the lower back further as the pain in your hip may be radiating from the back.     Enclosed is a copy of your results.     Results for orders placed or performed in visit on 11/06/17   XR Hip Left 2-3 Views    Narrative    LEFT HIP TWO-THREE VIEWS 11/6/2017 12:48 PM     HISTORY: Pain.    COMPARISON: None.      Impression    IMPRESSION: No evidence of fracture or osseous lesion. The hip joint  space is maintained. There are degenerative changes of the left  sacroiliac joint.    ANDREA QUIROZ MD       If you have any questions or concerns, please call myself or my nurse at [phone].      Sincerely,        Deisy Sharpe MD/[facility]

## 2017-11-07 PROBLEM — M25.552 HIP PAIN, LEFT: Status: ACTIVE | Noted: 2017-11-07

## 2017-11-07 NOTE — PROGRESS NOTES
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  On exam it didn't seem like this was were your pain was coming from, however. There really are 2 options at this point.  You can do an injection in the sacroiliac joint to see if this helps (I am doubtful it will) or look in to the lower back further as the pain in your hip may be radiating from the back.    Dr. Sharpe

## 2017-11-15 ENCOUNTER — ALLIED HEALTH/NURSE VISIT (OUTPATIENT)
Dept: NURSING | Facility: CLINIC | Age: 67
End: 2017-11-15
Payer: COMMERCIAL

## 2017-11-15 ENCOUNTER — RADIOLOGY INJECTION OFFICE VISIT (OUTPATIENT)
Dept: PALLIATIVE MEDICINE | Facility: CLINIC | Age: 67
End: 2017-11-15

## 2017-11-15 VITALS — DIASTOLIC BLOOD PRESSURE: 87 MMHG | HEART RATE: 62 BPM | SYSTOLIC BLOOD PRESSURE: 153 MMHG

## 2017-11-15 DIAGNOSIS — M54.16 LUMBAR RADICULOPATHY: Primary | ICD-10-CM

## 2017-11-15 DIAGNOSIS — Z23 NEED FOR PNEUMOCOCCAL VACCINATION: ICD-10-CM

## 2017-11-15 PROCEDURE — 90732 PPSV23 VACC 2 YRS+ SUBQ/IM: CPT

## 2017-11-15 PROCEDURE — G0009 ADMIN PNEUMOCOCCAL VACCINE: HCPCS

## 2017-11-15 ASSESSMENT — PAIN SCALES - GENERAL: PAINLEVEL: MODERATE PAIN (4)

## 2017-11-15 NOTE — NURSING NOTE
"Chief Complaint   Patient presents with     Pain       Initial /87  Pulse 62 Estimated body mass index is 47.25 kg/(m^2) as calculated from the following:    Height as of 7/17/17: 1.613 m (5' 3.5\").    Weight as of 11/6/17: 122.9 kg (271 lb).  Medication Reconciliation: complete       Pre-procedure Intake    Have you been fasting? NA    If yes, for how long? No     Are you taking a prescribed blood thinner such as coumadin, Plavix, Xarelto?    No    If yes, when did you take your last dose? No     Do you take aspirin?  No    If cervical procedure, have you held aspirin for 6 days?   NA    Do you have any allergies to contrast dye, iodine, steroid and/or numbing medications?  NO    Are you currently taking antibiotics or have an active infection?  NO    Have you had a fever/elevated temperature within the past week? NO    Are you currently taking oral steroids? NO    Do you have a ? Yes       Are you pregnant or breastfeeding?  NO    Are the vital signs normal?  Yes    Valdemar Gonzales MA            "

## 2017-11-15 NOTE — PATIENT INSTRUCTIONS
[facility]   Procedure Discharge Instructions    Today you saw:   Dr. Pam Yap      You had an:  Epidural steroid injection   sacro-iliac joint injection   facet joint injection  hip injection  piriformis injection     Medications used:  Lidocaine   Bupivacaine   Dexamethasone Depo-medrol  Omnipaque  Ropivicaine   Kenalog   Omniscan   Normal saline          If you have received sedation before, during, or after your procedure, for the next 24 hours you shall NOT:   -Drive  -Operate machinery  -Drink alcohol  -Sign any legal documents    Be cautious when walking. Numbness and/or weakness in the lower extremities may occur for up to 6-8 hours after the procedure due to effect of the local anesthetic    Do not drive for 6 hours. The effect of the local anesthetic could slow your reflexes.     You may resume your regular activities after 24 hours    Avoid strenuous activity for the first 24 hours    You may shower, however avoid swimming, tub baths or hot tubs for 24 hours following your procedure    You may have a mild to moderate increase in pain for several days following the injection.    It may take up to 14 days for the steroid medication to start working although you may feel the effect as early as a few days after the procedure.       You may use ice packs for 10-15 minutes, 3 to 4 times a day at the injection site for comfort    Do not use heat to painful areas for 6 to 8 hours. This will give the local anesthetic time to wear off and prevent you from accidentally burning your skin.     You may use anti-inflammatory medications (such as Ibuprofen or Aleve or Advil) or Tylenol for pain control if necessary    If you were fasting, you may resume your normal diet and medications after the procedure    If you have diabetes, check your blood sugar more frequently than usual as your blood sugar may be higher than normal for 10-14 days following a steroid injection. Contact your doctor who  manages your diabetes if your blood sugar is higher than usual    If you experience any of the following, call the pain center nursing line during work hours at [phone] or the after hours provider line at [phone]:  -Fever over 100 degree F  -Swelling, bleeding, redness, drainage, warmth at the injection site  -Progressive weakness or numbness in your legs or arms  -Loss of bowel or bladder function  -Unusual headache that is not relieved by Tylenol  -Unusual new onset of pain that is not improving      Phone #s:  Appointment line: [phone];  Nurse line: [phone]

## 2017-11-15 NOTE — PROGRESS NOTES
Patient had previous L4-5 interlaminar.  Did have significant numbing.    Had newer MRI at [facility], reviewed imaging.  Epidural space at L4-5 is very minimal.    I would recommend using 7 inch needle for left L4-5 transforaminal epidural steroid injection     Madison Yap MD  [facility]

## 2017-11-16 ENCOUNTER — TRANSFERRED RECORDS (OUTPATIENT)
Dept: HEALTH INFORMATION MANAGEMENT | Facility: CLINIC | Age: 67
End: 2017-11-16

## 2017-11-16 LAB — COLOGUARD-ABSTRACT: NEGATIVE

## 2017-12-21 ENCOUNTER — RADIANT APPOINTMENT (OUTPATIENT)
Dept: RADIOLOGY | Facility: CLINIC | Age: 67
End: 2017-12-21
Attending: PSYCHIATRY & NEUROLOGY
Payer: COMMERCIAL

## 2017-12-21 ENCOUNTER — RADIOLOGY INJECTION OFFICE VISIT (OUTPATIENT)
Dept: PALLIATIVE MEDICINE | Facility: CLINIC | Age: 67
End: 2017-12-21
Payer: COMMERCIAL

## 2017-12-21 VITALS — OXYGEN SATURATION: 97 % | DIASTOLIC BLOOD PRESSURE: 66 MMHG | HEART RATE: 65 BPM | SYSTOLIC BLOOD PRESSURE: 143 MMHG

## 2017-12-21 DIAGNOSIS — M54.16 LUMBAR RADICULOPATHY: ICD-10-CM

## 2017-12-21 DIAGNOSIS — M54.16 LUMBAR RADICULOPATHY: Primary | ICD-10-CM

## 2017-12-21 PROCEDURE — 64483 NJX AA&/STRD TFRM EPI L/S 1: CPT | Mod: LT | Performed by: PSYCHIATRY & NEUROLOGY

## 2017-12-21 ASSESSMENT — PAIN SCALES - GENERAL: PAINLEVEL: MODERATE PAIN (5)

## 2017-12-21 NOTE — PATIENT INSTRUCTIONS
[facility]   Procedure Discharge Instructions    Today you saw:  Dr. Pam Yap      You had an:  Epidural steroid injection   -lumbar    Medications used:  Lidocaine   Bupivacaine   Dexamethasone  Omnipaque            Be cautious when walking. Numbness and/or weakness in the lower extremities may occur for up to 6-8 hours after the procedure due to effect of the local anesthetic    Do not drive for 6 hours. The effect of the local anesthetic could slow your reflexes.     You may resume your regular activities after 24 hours    Avoid strenuous activity for the first 24 hours    You may shower, however avoid swimming, tub baths or hot tubs for 24 hours following your procedure    You may have a mild to moderate increase in pain for several days following the injection.    It may take up to 14 days for the steroid medication to start working although you may feel the effect as early as a few days after the procedure.       You may use ice packs for 10-15 minutes, 3 to 4 times a day at the injection site for comfort    Do not use heat to painful areas for 6 to 8 hours. This will give the local anesthetic time to wear off and prevent you from accidentally burning your skin.     You may use anti-inflammatory medications (such as Ibuprofen or Aleve or Advil) or Tylenol for pain control if necessary    If you were fasting, you may resume your normal diet and medications after the procedure    If you have diabetes, check your blood sugar more frequently than usual as your blood sugar may be higher than normal for 10-14 days following a steroid injection. Contact your doctor who manages your diabetes if your blood sugar is higher than usual    If you experience any of the following, call the pain center nursing line during work hours at [phone] or the after hours provider line at [phone]:  -Fever over 100 degree F  -Swelling, bleeding, redness, drainage, warmth at the injection  site  -Progressive weakness or numbness in your legs or arms  -Loss of bowel or bladder function  -Unusual headache that is not relieved by Tylenol or other pain reliever  -Unusual new onset of pain that is not improving      Phone #s:  Appointment line: [phone];  Nurse line: [phone]

## 2017-12-21 NOTE — PROGRESS NOTES
Pre procedure Diagnosis: lumbar radiculopathy   Post procedure Diagnosis: Same  Procedure performed: left L4-5 transforaminal epidural steroid injection   Anesthesia: none  Complications: none  Operators: Madison Yap MD     Indications:   Rozina Bell is a 67 year old female was sent by JAK Gutierrez  for epidural steroid injection.  There have been various delays in scheduling.  They have a history of low back pain, going into the left buttock, and down into the left lateral leg to the foot.  Exam shows limited range of motion, myofascial tenderness  and they have tried conservative treatment including medications..    MRI was done on 8/1/17 at [facility] which showed   CONCLUSION: Right convex scoliosis, no acute fractures and significant findings as follows:  1. Moderate to severe L3-4 degenerative central stenosis. Active endplate discogenic marrow changes noted centrally and to the left of midline.  2. Moderate degenerative L4-5 central spinal stenosis due to facet arthropathy causing 5 mm degenerative spondylolisthesis.  3. Severe L1-2 and L2-3 disc degeneration without central stenosis.  4. Multilevel degenerative foraminal stenosis, moderate to severe on the left at L3-4 and mild at other levels as reported.    Options/alternatives, benefits and risks were discussed with the patient including bleeding, infection, tissue trauma, numbness, weakness, paralysis, spinal cord injury, radiation exposure, headache and reaction to medications. Questions were answered to her satisfaction and she agrees to proceed. Voluntary informed consent was obtained and signed.     Vitals were reviewed: Yes  Allergies were reviewed:  Yes   Medications were reviewed:  Yes   Pre-procedure pain score: 5/10    Procedure:  After getting informed consent, patient was brought into the procedure suite and was placed in a prone position on the procedure table.   A Pause for the Cause was performed.  Patient was prepped and draped  in sterile fashion.     After identifying the left L4-5 neuroforamen, the C-arm was rotated to a left lateral oblique angle.  A total of 5ml of Lidocaine 1% was used to anesthetize the skin and the needle track at a skin entry site coaxial with the fluoroscopy beam, and overriding the superior aspect of the neuroforamen.  A 22 gauge 7 inch spinal needle was advanced under intermittent fluoroscopy until it entered the foramen superiorly.    The position was then inspected from anteroposterior and lateral views, and the needle adjusted appropriately.  A total of 2ml of Omnipaque-300 was injected, confirming appropriate position, with spread into the nerve root sheath and the epidural space, with no intravascular uptake. 8ml was wasted    1.5ml of 0.2% ropivacaine with 10mg of dexamethasone was injected.  The needle was flushed with lidocaine and removed.    During the procedure, there was a paresthesia associated with pressure, improved with slowing.  Hemostasis was achieved, the area was cleaned, and bandaids were placed when appropriate.  The patient tolerated the procedure well, and was taken to the recovery room.    Images were saved to PACS.    Post-procedure pain score: 0/10  Follow-up includes:   -f/u phone call in one week  -f/u with referring provider    Madison Yap MD  [facility]

## 2017-12-21 NOTE — NURSING NOTE
"Chief Complaint   Patient presents with     Pain       Initial /65  Pulse 75 Estimated body mass index is 47.25 kg/(m^2) as calculated from the following:    Height as of 7/17/17: 1.613 m (5' 3.5\").    Weight as of 11/6/17: 122.9 kg (271 lb).  Medication Reconciliation: complete     Pre-procedure Intake    Have you been fasting? NA    If yes, for how long? No     Are you taking a prescribed blood thinner such as coumadin, Plavix, Xarelto?    No    If yes, when did you take your last dose? No     Do you take aspirin?  No    If cervical procedure, have you held aspirin for 6 days?   NA    Do you have any allergies to contrast dye, iodine, steroid and/or numbing medications?  NO    Are you currently taking antibiotics or have an active infection?  NO    Have you had a fever/elevated temperature within the past week? NO    Are you currently taking oral steroids? NO    Do you have a ? Yes       Are you pregnant or breastfeeding?  NO    Are the vital signs normal?  Yes    Valdemar Gonzales MA            "

## 2017-12-21 NOTE — NURSING NOTE
Discharge Information    IV Discontiued Time:  NA    Amount of Fluid Infused:  NA    Discharge Criteria = When patient returns to baseline or as per MD order    Consciousness:  Pt is fully awake    Circulation:  BP +/- 20% of pre-procedure level    Respiration:  Patient is able to breathe deeply    O2 Sat:  Patient is able to maintain O2 Sat >92% on room air    Activity:  Moves 4 extremities on command    Ambulation:  Patient is able to stand and walk or stand and pivot into wheelchair    Dressing:  Clean/dry or No Dressing    Notes:   Discharge instructions and AVS given to patient    Patient meets criteria for discharge?  YES    Admitted to PCU?  No    Responsible adult present to accompany patient home?  Yes    Signature/Title:    michael whiteside RN Care Coordinator  [facility]

## 2017-12-21 NOTE — MR AVS SNAPSHOT
After Visit Summary   12/21/2017    Rozina Bell    MRN: [id number]           Patient Information     Date Of Birth          1950        Visit Information        Provider Department      12/21/2017 11:15 AM Pam Yap MD [facility] [place]        Care Instructions    [facility]   Procedure Discharge Instructions    Today you saw:  Dr. Pam Yap      You had an:  Epidural steroid injection   -lumbar    Medications used:  Lidocaine   Bupivacaine   Dexamethasone  Omnipaque            Be cautious when walking. Numbness and/or weakness in the lower extremities may occur for up to 6-8 hours after the procedure due to effect of the local anesthetic    Do not drive for 6 hours. The effect of the local anesthetic could slow your reflexes.     You may resume your regular activities after 24 hours    Avoid strenuous activity for the first 24 hours    You may shower, however avoid swimming, tub baths or hot tubs for 24 hours following your procedure    You may have a mild to moderate increase in pain for several days following the injection.    It may take up to 14 days for the steroid medication to start working although you may feel the effect as early as a few days after the procedure.       You may use ice packs for 10-15 minutes, 3 to 4 times a day at the injection site for comfort    Do not use heat to painful areas for 6 to 8 hours. This will give the local anesthetic time to wear off and prevent you from accidentally burning your skin.     You may use anti-inflammatory medications (such as Ibuprofen or Aleve or Advil) or Tylenol for pain control if necessary    If you were fasting, you may resume your normal diet and medications after the procedure    If you have diabetes, check your blood sugar more frequently than usual as your blood sugar may be higher than normal for 10-14 days following a steroid injection. Contact your doctor who manages your  diabetes if your blood sugar is higher than usual    If you experience any of the following, call the pain center nursing line during work hours at [phone] or the after hours provider line at [phone]:  -Fever over 100 degree F  -Swelling, bleeding, redness, drainage, warmth at the injection site  -Progressive weakness or numbness in your legs or arms  -Loss of bowel or bladder function  -Unusual headache that is not relieved by Tylenol or other pain reliever  -Unusual new onset of pain that is not improving      Phone #s:  Appointment line: [phone];  Nurse line: [phone]              Follow-ups after your visit        Your next 10 appointments already scheduled     Dec 21, 2017 11:15 AM CST   Radiology Injections with Pam Yap MD   [facility] [place] ([facility] [place])    [address]  [place] [id number] [phone]            Dec 21, 2017 11:15 AM CST   XR LUMBAR TRANSFORAMINAL INJ SINGLE with BEPAIN1   [facility] [place] [facility] ([facility] [place])    [address]  [place] [id number] [phone]           For nerve root injection, please send or bring copies of any MRIs or other scans you have had.  Bring a list of your current medicines to your exam. (Include vitamins, minerals and over-the-counter medicines.) Leave your valuables at home.  Plan to have someone drive you home afterward.  Stop taking the following medicines (but talk to your doctor first):   If you take blood thinners, you may need to stop taking them a few days before treatment. Talk to your doctor before stopping these medicines.Stop taking Coumadin (warfarin) 3 days before treatment. Restart the day after treatment.   If you take Plavix, Ticlid, Pletal or Persantine, please ask your doctor if you should stop these medicines. You may need extra tests on the morning of your scan.   If you take Xarelto (Rivaroxaban), you may need to stop taking it  "24 hours before treatment. Talk to your doctor before stopping this medicine. Restart the day after treatment.  You may take your other medicines as normal.  Stop all food and drink (including water) 3 hours before your test or treatment.  Please tell the doctor:   If you are allergic to X-ray dye (contrast fluid).   If you may be pregnant.  Injections take about 30 to 45 minutes. Most people spend up to 2 hours in the clinic or hospital.  Please call the Imaging Department at your exam site with any questions.              Who to contact     If you have questions or need follow up information about today's clinic visit or your schedule please contact [facility] directly at [phone].  Normal or non-critical lab and imaging results will be communicated to you by MyChart, letter or phone within 4 business days after the clinic has received the results. If you do not hear from us within 7 days, please contact the clinic through [employer] or phone. If you have a critical or abnormal lab result, we will notify you by phone as soon as possible.  Submit refill requests through [employer] or call your pharmacy and they will forward the refill request to us. Please allow 3 business days for your refill to be completed.          Additional Information About Your Visit        MyChart Information     [employer] lets you send messages to your doctor, view your test results, renew your prescriptions, schedule appointments and more. To sign up, go to [email or web address] . Click on \"Log in\" on the left side of the screen, which will take you to the Welcome page. Then click on \"Sign up Now\" on the right side of the page.     You will be asked to enter the access code listed below, as well as some personal information. Please follow the directions to create your username and password.     Your access code is: [email or web address]  Expires: 2018  9:22 AM     Your access code will  in 90 days. If you need help or a new " code, please call your [facility] clinic or [phone].        Care EveryWhere ID     This is your Care EveryWhere ID. This could be used by other organizations to access your [facility] medical records  [id number]        Your Vitals Were     Pulse                   75            Blood Pressure from Last 3 Encounters:   12/21/17 147/65   11/15/17 153/87   11/06/17 136/88    Weight from Last 3 Encounters:   11/06/17 122.9 kg (271 lb)   08/16/17 125.2 kg (276 lb)   07/17/17 125.2 kg (276 lb)              Today, you had the following     No orders found for display       Primary Care Provider Office Phone # Fax #    Deisy Sharpe [phone] [address]  [place] [address]        Equal Access to Services     [facility]: Hadii aad ku hadasho Soomaali, waaxda luqadaha, qaybta kaalmada adeegyada, nelson tavares . So [facility] [phone].    ATENCIÓN: Si habla español, tiene a perkins disposición servicios gratuitos de asistencia lingüística. Ancelmo al [phone].    We comply with applicable federal civil rights laws and [place] laws. We do not discriminate on the basis of race, color, national origin, age, disability, sex, sexual orientation, or gender identity.            Thank you!     Thank you for choosing [facility]  for your care. Our goal is always to provide you with excellent care. Hearing back from our patients is one way we can continue to improve our services. Please take a few minutes to complete the written survey that you may receive in the mail after your visit with us. Thank you!             Your Updated Medication List - Protect others around you: Learn how to safely use, store and throw away your medicines at www.disposemymeds.org.          This list is accurate as of: 12/21/17 11:11 AM.  Always use your most recent med list.                   Brand Name Dispense Instructions for use Diagnosis    calcium + D 600-200 MG-UNIT Tabs    Generic drug:  calcium carbonate-vitamin D      1 TABLET TWICE  DAILY        GLUCOSAMINE CHONDROITIN Tabs      1 TABLET DAILY        hydrOXYzine 25 MG tablet    ATARAX     Take 25 mg by mouth 3 times daily as needed for itching        magnesium oxide 400 MG Caps     270 capsule    Take 1 tablet by mouth 3 times daily    Hypomagnesemia       meloxicam 15 MG tablet    MOBIC    90 tablet    TAKE 1 TABLET BY MOUTH EVERY DAY.    Spinal stenosis, unspecified spinal region       MULTI-VITAMIN PO      None Entered        * order for DME     1 Device    Equipment being ordered: compression stocking joey    Edema       * order for DME     1 Device    Equipment being ordered: [id number] Thumb Splint, CMC RT, MD  [id number] Thumb splint, CMC, LT MD    Primary osteoarthritis of first carpometacarpal joint of right hand, Primary osteoarthritis of first carpometacarpal joint of left hand       * SYNTHROID 50 MCG tablet   Generic drug:  levothyroxine     90 tablet    Take 1 tablet (50 mcg) by mouth daily    Acquired hypothyroidism       * levothyroxine 50 MCG tablet    SYNTHROID/LEVOTHROID    90 tablet    Take 1 tablet (50 mcg) by mouth daily Patient would like to see the price difference between synthroid and levothyroxine    Hypothyroidism, unspecified type       VITAMIN C PO      Take 500 mg by mouth daily        vitamin D 1000 UNITS capsule     180 capsule    Take 2,000 Units by mouth daily    Hypovitaminosis D       VITAMIN E PO      1 TABLET DAILY        * Notice:  This list has 4 medication(s) that are the same as other medications prescribed for you. Read the directions carefully, and ask your doctor or other care provider to review them with you.

## 2017-12-27 ENCOUNTER — TELEPHONE (OUTPATIENT)
Dept: OTOLARYNGOLOGY | Facility: CLINIC | Age: 67
End: 2017-12-27

## 2017-12-27 ENCOUNTER — OFFICE VISIT (OUTPATIENT)
Dept: OTOLARYNGOLOGY | Facility: CLINIC | Age: 67
End: 2017-12-27
Payer: COMMERCIAL

## 2017-12-27 VITALS — WEIGHT: 271 LBS | HEIGHT: 63 IN | RESPIRATION RATE: 12 BRPM | BODY MASS INDEX: 48.02 KG/M2

## 2017-12-27 DIAGNOSIS — R04.0 EPISTAXIS: Primary | ICD-10-CM

## 2017-12-27 DIAGNOSIS — J30.1 ACUTE SEASONAL ALLERGIC RHINITIS DUE TO POLLEN: ICD-10-CM

## 2017-12-27 PROCEDURE — 99213 OFFICE O/P EST LOW 20 MIN: CPT | Mod: 25 | Performed by: OTOLARYNGOLOGY

## 2017-12-27 PROCEDURE — 30901 CONTROL OF NOSEBLEED: CPT | Performed by: OTOLARYNGOLOGY

## 2017-12-27 RX ORDER — HYDROXYZINE HYDROCHLORIDE 25 MG/1
25 TABLET, FILM COATED ORAL 2 TIMES DAILY PRN
Qty: 60 TABLET | Refills: 3 | Status: SHIPPED | OUTPATIENT
Start: 2017-12-27 | End: 2018-11-07

## 2017-12-27 NOTE — LETTER
12/27/2017         RE: Rozina Bell  [address] [address]  [place] [address]        Dear Colleague,    Thank you for referring your patient, Rozina Bell, to the [facility]. Please see a copy of my visit note below.    Chief Complaint - Epistaxis    History of Present Illness - Rozina Bell is a 67 year old female with recent onset epistaxis. It occurs on the left side. It usually only comes out the front of the nose, but it has ran down the back of the throat at times. It has been going on for two months, but worsened recently. She comes into clinic today bleeding perfousely from her nose. The patient has never gone to the emergency department or required a blood transfusion due to nose bleeding. The patient has no personal or family history of bleeding disorders. The patient takes no blood-thinning medication. The patient has tried stopping flonase and AYR gel.      Past Medical History -   Patient Active Problem List   Diagnosis     Hypothyroidism     Allergic state     Spinal stenosis     CARDIOVASCULAR SCREENING; LDL GOAL LESS THAN 160     Fibromyalgia     Obesity     AK (actinic keratosis)     Hypertension goal BP (blood pressure) < 140/90     Skin cancer     Advanced directives, counseling/discussion     Degenerative spondylolisthesis     Metatarsal fracture     Prepatellar bursitis     MMT (medial meniscus tear)     Lumbar spinal stenosis     Hypovitaminosis D     Primary osteoarthritis of both first carpometacarpal joints     Morbid obesity (H)     Hip pain, left       Current Medications -   Current Outpatient Prescriptions:      hydrOXYzine (ATARAX) 25 MG tablet, Take 1 tablet (25 mg) by mouth 2 times daily as needed for itching, Disp: 60 tablet, Rfl: 3     SYNTHROID 50 MCG tablet, Take 1 tablet (50 mcg) by mouth daily, Disp: 90 tablet, Rfl: 3     meloxicam (MOBIC) 15 MG tablet, TAKE 1 TABLET BY MOUTH EVERY DAY., Disp: 90 tablet, Rfl: 3     levothyroxine  (SYNTHROID/LEVOTHROID) 50 MCG tablet, Take 1 tablet (50 mcg) by mouth daily Patient would like to see the price difference between synthroid and levothyroxine, Disp: 90 tablet, Rfl: 3     order for DME, Equipment being ordered: [id number] Thumb Splint, CMC RT, MD  [id number] Thumb splint, CMC, LT MD (Patient not taking: Reported on 11/6/2017), Disp: 1 Device, Rfl: 0     Ascorbic Acid (VITAMIN C PO), Take 500 mg by mouth daily, Disp: , Rfl:      Cholecalciferol (VITAMIN D) 1000 UNITS capsule, Take 2,000 Units by mouth daily, Disp: 180 capsule, Rfl: 4     magnesium oxide 400 MG CAPS, Take 1 tablet by mouth 3 times daily, Disp: 270 capsule, Rfl: 4     ORDER FOR DME, Equipment being ordered: compression stocking joey, Disp: 1 Device, Rfl: 0     MULTI-VITAMIN OR, None Entered, Disp: , Rfl:      CALCIUM + D 600-200 MG-UNIT OR TABS, 1 TABLET TWICE  DAILY, Disp: , Rfl:      VITAMIN E OR, 1 TABLET DAILY, Disp: , Rfl:      GLUCOSAMINE CHONDROITIN OR TABS, 1 TABLET DAILY, Disp: , Rfl:     Allergies -   Allergies   Allergen Reactions     Amitriptyline      jittery     Celebrex [Celecoxib]      Epinephrine-Lidocaine-Na Metabisulfite Other (See Comments)     Palpitations, flushing     Latex      Morphine      Penicillins      Sulfa Drugs      Ultram [Tramadol Hcl]      Yellow Dye      Zithromax [Azithromycin]        Social History -   Social History     Social History     Marital status:      Spouse name: N/A     Number of children: N/A     Years of education: N/A     Social History Main Topics     Smoking status: Never Smoker     Smokeless tobacco: Never Used      Comment: Lives in smoke free household     Alcohol use No     Drug use: No     Sexual activity: Not Currently     Partners: Male     Other Topics Concern     None     Social History Narrative       Family History -   Family History   Problem Relation Age of Onset     HEART DISEASE Mother      Arthritis Mother      Cardiovascular Mother      Thyroid Disease  "Mother      Hypertension Mother      CEREBROVASCULAR DISEASE Mother      GASTROINTESTINAL DISEASE Father      CANCER Father      Respiratory Father      HEART DISEASE Maternal Grandfather      DIABETES Paternal Grandfather      CANCER Brother      DIABETES Brother      HEART DISEASE Sister      Neurologic Disorder Sister      EPILEPSY     Allergies Son      HEART DISEASE Brother      TRIPLE BYPASS     Lipids Brother      Hypertension Brother      Chronic Obstructive Pulmonary Disease Brother      Glaucoma No family hx of      Macular Degeneration No family hx of        Review of Systems - As per HPI and PMHx, otherwise 7 system review of the head and neck negative.    Physical Exam  Resp 12  Ht 1.6 m (5' 3\")  Wt 122.9 kg (271 lb)  BMI 48.01 kg/m2  General - The patient is in no distress.  Alert and oriented x3, answers questions and cooperates with examination appropriately.   Voice and Breathing - The patient was breathing comfortably without the use of accessory muscles. There was no wheezing, stridor, or stertor.  The patients voice was clear and strong, with no dysphonia.  Head and Face - Normocephalic and atraumatic.    Eyes - Extraocular movements intact. Sclera were not icteric or injected, conjunctiva were pink and moist.  Neurologic - Cranial nerves II-XII are grossly intact. Specifically, the facial nerve is intact, House-Brackmann grade 1 of 6.   Nose - No significant external deformity. The nasal mucosa along the anterior septum on the left side shows a pumping arterial bleeding. Blood was dripping down her face. The right side was mostly normal.  The septum was midline, turbinates are of normal size and position. Clots in left side. See below.  Mouth - postnasal drainage of blood and clots.   Neck -  No masses, erythema, or rashes.    Procedure - I sprayed the left anterior nasal cavity and septum with phenylephrine and lidocaine. I sucitoned lots of clot. I applied 4 sticks of silver nitrate to the " prominent blood vessel that was pumping along the anterior septum. Hemostasis was achieved. I applied bacitracin ointment to the wound with a q-tip.    A/P - Rozina Bell is a 67 year old female with epistaxis. She had a pumper along the inferior aspect of the left anterior septum. We discussed restrictions on manipulation and picking of the nose. I explained using vaseline twice daily to the anterior septum, nasal saline spray 3-5 times per day, and a humidifier at the bedside at night.    I also explained applying digital pressure to the nasal tip for a minimum of 15-20 minutes to stop a nose bleed. If that doesn't stop the bleeding the patient needs to proceed to the nearest emergency department. I will see the patient back in 2-4 weeks.     Marcos Dias MD  Otolaryngology  [facility]      Again, thank you for allowing me to participate in the care of your patient.        Sincerely,        Marcos Dias MD

## 2017-12-27 NOTE — MR AVS SNAPSHOT
After Visit Summary   12/27/2017    Rozina Bell    MRN: [id number]           Patient Information     Date Of Birth          1950        Visit Information        Provider Department      12/27/2017 10:15 AM Marcos Dias MD [facility]        Today's Diagnoses     Epistaxis    -  1    Acute seasonal allergic rhinitis due to pollen          Care Instructions    General Scheduling Information  To schedule your CT/MRI scan, please contact [facility] at [phone] [address]  [place] [address]    To schedule your Surgery, please contact our Specialty Schedulers at [phone]    ENT Clinic Locations Clinic Hours Telephone Number     Daria Ash  [address]  [place] [address]   Tuesday:       8:00am -- 4:00pm    Wednesday:  8:00am - 4:00pm   To schedule an appointment with   Dr. Dias,   please contact our   Specialty Scheduling Department at:     [phone]       Daria Wharton  [address]   [place] [address]   Friday:          8:00am - 4:00pm         Urgent Care Locations Clinic Hours Telephone Numbers     Daria Boateng  [address]   [place] [address]     Monday-Friday:     11:00pm - 9:00pm    Saturday-Sunday:  9:00am - 5:00pm   [phone]     Daria Whartno  [address]   [place] [address]     Monday-Friday:      5:00pm - 9:00pm     Saturday-Sunday:  9:00am - 5:00pm   [phone]               Follow-ups after your visit        Your next 10 appointments already scheduled     Jan 03, 2018 11:15 AM CST   Return Visit with Marcos Dias MD   [facility] ([facility])    [address] [place], [place] [address] [id number] [phone]              Who to contact     If you have questions or need follow up information about today's clinic visit or your schedule please contact [facility] directly at [phone].  Normal or non-critical lab and imaging  "results will be communicated to you by MyChart, letter or phone within 4 business days after the clinic has received the results. If you do not hear from us within 7 days, please contact the clinic through Coupmon or phone. If you have a critical or abnormal lab result, we will notify you by phone as soon as possible.  Submit refill requests through Coupmon or call your pharmacy and they will forward the refill request to us. Please allow 3 business days for your refill to be completed.          Additional Information About Your Visit        Coupmon Information     Coupmon lets you send messages to your doctor, view your test results, renew your prescriptions, schedule appointments and more. To sign up, go to [email or web address] . Click on \"Log in\" on the left side of the screen, which will take you to the Welcome page. Then click on \"Sign up Now\" on the right side of the page.     You will be asked to enter the access code listed below, as well as some personal information. Please follow the directions to create your username and password.     Your access code is: [email or web address]  Expires: 2018  9:22 AM     Your access code will  in 90 days. If you need help or a new code, please call your [facility] clinic or [phone].        Care EveryWhere ID     This is your Care EveryWhere ID. This could be used by other organizations to access your [facility] medical records  [id number]        Your Vitals Were     Respirations Height BMI (Body Mass Index)             12 1.6 m (5' 3\") 48.01 kg/m2          Blood Pressure from Last 3 Encounters:   17 143/66   11/15/17 153/87   17 136/88    Weight from Last 3 Encounters:   17 122.9 kg (271 lb)   17 122.9 kg (271 lb)   17 125.2 kg (276 lb)              We Performed the Following     Nasal Cautery Simple Unilat          Today's Medication Changes          These changes are accurate as of: 17  4:40 PM.  If you have any questions, ask " your nurse or doctor.               These medicines have changed or have updated prescriptions.        Dose/Directions    hydrOXYzine 25 MG tablet   Commonly known as:  ATARAX   This may have changed:  when to take this   Used for:  Acute seasonal allergic rhinitis due to pollen   Changed by:  Marcos Dias MD        Dose:  25 mg   Take 1 tablet (25 mg) by mouth 2 times daily as needed for itching   Quantity:  60 tablet   Refills:  3            Where to get your medicines      [id number] [address] [facility]  [address] [facility] [address]    Hours:  24-hours Phone:  [phone]     hydrOXYzine 25 MG tablet                Primary Care Provider Office Phone # Fax #    Deisy Sharpe [phone] [address] [facility] [address]        Equal Access to Services     [facility]: Hadii aad ku hadasho Soomaali, waaxda luqadaha, qaybta kaalmada adeegyada, waxay ashiain haysamiran judith tavares . So [facility] [phone].    ATENCIÓN: Si habla español, tiene a perkins disposición servicios gratuitos de asistencia lingüística. [facility] [phone].    We comply with applicable federal civil rights laws and [place] laws. We do not discriminate on the basis of race, color, national origin, age, disability, sex, sexual orientation, or gender identity.            Thank you!     Thank you for choosing [facility]  for your care. Our goal is always to provide you with excellent care. Hearing back from our patients is one way we can continue to improve our services. Please take a few minutes to complete the written survey that you may receive in the mail after your visit with us. Thank you!             Your Updated Medication List - Protect others around you: Learn how to safely use, store and throw away your medicines at www.disposemymeds.org.          This list is  accurate as of: 12/27/17  4:40 PM.  Always use your most recent med list.                   Brand Name Dispense Instructions for use Diagnosis    calcium + D 600-200 MG-UNIT Tabs   Generic drug:  calcium carbonate-vitamin D      1 TABLET TWICE  DAILY        GLUCOSAMINE CHONDROITIN Tabs      1 TABLET DAILY        hydrOXYzine 25 MG tablet    ATARAX    60 tablet    Take 1 tablet (25 mg) by mouth 2 times daily as needed for itching    Acute seasonal allergic rhinitis due to pollen       magnesium oxide 400 MG Caps     270 capsule    Take 1 tablet by mouth 3 times daily    Hypomagnesemia       meloxicam 15 MG tablet    MOBIC    90 tablet    TAKE 1 TABLET BY MOUTH EVERY DAY.    Spinal stenosis, unspecified spinal region       MULTI-VITAMIN PO      None Entered        * order for DME     1 Device    Equipment being ordered: compression stocking joey    Edema       * order for DME     1 Device    Equipment being ordered: [id number] Thumb Splint, [facility] RT, MD  [id number] Thumb splint, CMC, LT MD    Primary osteoarthritis of first carpometacarpal joint of right hand, Primary osteoarthritis of first carpometacarpal joint of left hand       * SYNTHROID 50 MCG tablet   Generic drug:  levothyroxine     90 tablet    Take 1 tablet (50 mcg) by mouth daily    Acquired hypothyroidism       * levothyroxine 50 MCG tablet    SYNTHROID/LEVOTHROID    90 tablet    Take 1 tablet (50 mcg) by mouth daily Patient would like to see the price difference between synthroid and levothyroxine    Hypothyroidism, unspecified type       VITAMIN C PO      Take 500 mg by mouth daily        vitamin D 1000 UNITS capsule     180 capsule    Take 2,000 Units by mouth daily    Hypovitaminosis D       VITAMIN E PO      1 TABLET DAILY        * Notice:  This list has 4 medication(s) that are the same as other medications prescribed for you. Read the directions carefully, and ask your doctor or other care provider to review them with you.

## 2017-12-27 NOTE — NURSING NOTE
"Chief Complaint   Patient presents with     Consult     nose bleeds       Initial Resp 12  Ht 1.6 m (5' 3\")  Wt 122.9 kg (271 lb)  BMI 48.01 kg/m2 Estimated body mass index is 48.01 kg/(m^2) as calculated from the following:    Height as of this encounter: 1.6 m (5' 3\").    Weight as of this encounter: 122.9 kg (271 lb).  Medication Reconciliation: complete     Guy Avila CMA  Vitals pulled from last visit, unable to obtain due to nose bleeding    "

## 2017-12-27 NOTE — TELEPHONE ENCOUNTER
Reason for Call:  Other call back    Detailed comments:  Patient calling. She was seen today for nosebleeds.   1) she has allergies.   2) can she be allergic to the silver nitrate   3) her nose is running like a faucet  4) she is not to blow, ok to dab her nose.   5) can she take tylenol for her headache    Please call with answers.     Phone Number Patient can be reached at: Cell number on file:    Telephone Information:   Mobile [phone]       Best Time:  Any     Can we leave a detailed message on this number? YES    Call taken on 12/27/2017 at 3:32 PM by Danita He

## 2017-12-27 NOTE — PROGRESS NOTES
Chief Complaint - Epistaxis    History of Present Illness - Rozina Bell is a 67 year old female with recent onset epistaxis. It occurs on the left side. It usually only comes out the front of the nose, but it has ran down the back of the throat at times. It has been going on for two months, but worsened recently. She comes into clinic today bleeding perfousely from her nose. The patient has never gone to the emergency department or required a blood transfusion due to nose bleeding. The patient has no personal or family history of bleeding disorders. The patient takes no blood-thinning medication. The patient has tried stopping flonase and AYR gel.      Past Medical History -   Patient Active Problem List   Diagnosis     Hypothyroidism     Allergic state     Spinal stenosis     CARDIOVASCULAR SCREENING; LDL GOAL LESS THAN 160     Fibromyalgia     Obesity     AK (actinic keratosis)     Hypertension goal BP (blood pressure) < 140/90     Skin cancer     Advanced directives, counseling/discussion     Degenerative spondylolisthesis     Metatarsal fracture     Prepatellar bursitis     MMT (medial meniscus tear)     Lumbar spinal stenosis     Hypovitaminosis D     Primary osteoarthritis of both first carpometacarpal joints     Morbid obesity (H)     Hip pain, left       Current Medications -   Current Outpatient Prescriptions:      hydrOXYzine (ATARAX) 25 MG tablet, Take 1 tablet (25 mg) by mouth 2 times daily as needed for itching, Disp: 60 tablet, Rfl: 3     SYNTHROID 50 MCG tablet, Take 1 tablet (50 mcg) by mouth daily, Disp: 90 tablet, Rfl: 3     meloxicam (MOBIC) 15 MG tablet, TAKE 1 TABLET BY MOUTH EVERY DAY., Disp: 90 tablet, Rfl: 3     levothyroxine (SYNTHROID/LEVOTHROID) 50 MCG tablet, Take 1 tablet (50 mcg) by mouth daily Patient would like to see the price difference between synthroid and levothyroxine, Disp: 90 tablet, Rfl: 3     order for DME, Equipment being ordered: [id number] Thumb Splint, [facility] [place] ROSARIO   [id number] Thumb splint, CMC, LT MD (Patient not taking: Reported on 11/6/2017), Disp: 1 Device, Rfl: 0     Ascorbic Acid (VITAMIN C PO), Take 500 mg by mouth daily, Disp: , Rfl:      Cholecalciferol (VITAMIN D) 1000 UNITS capsule, Take 2,000 Units by mouth daily, Disp: 180 capsule, Rfl: 4     magnesium oxide 400 MG CAPS, Take 1 tablet by mouth 3 times daily, Disp: 270 capsule, Rfl: 4     ORDER FOR DME, Equipment being ordered: compression stocking joey, Disp: 1 Device, Rfl: 0     MULTI-VITAMIN OR, None Entered, Disp: , Rfl:      CALCIUM + D 600-200 MG-UNIT OR TABS, 1 TABLET TWICE  DAILY, Disp: , Rfl:      VITAMIN E OR, 1 TABLET DAILY, Disp: , Rfl:      GLUCOSAMINE CHONDROITIN OR TABS, 1 TABLET DAILY, Disp: , Rfl:     Allergies -   Allergies   Allergen Reactions     Amitriptyline      jittery     Celebrex [Celecoxib]      Epinephrine-Lidocaine-Na Metabisulfite Other (See Comments)     Palpitations, flushing     Latex      Morphine      Penicillins      Sulfa Drugs      Ultram [Tramadol Hcl]      Yellow Dye      Zithromax [Azithromycin]        Social History -   Social History     Social History     Marital status:      Spouse name: N/A     Number of children: N/A     Years of education: N/A     Social History Main Topics     Smoking status: Never Smoker     Smokeless tobacco: Never Used      Comment: Lives in smoke free household     Alcohol use No     Drug use: No     Sexual activity: Not Currently     Partners: Male     Other Topics Concern     None     Social History Narrative       Family History -   Family History   Problem Relation Age of Onset     HEART DISEASE Mother      Arthritis Mother      Cardiovascular Mother      Thyroid Disease Mother      Hypertension Mother      CEREBROVASCULAR DISEASE Mother      GASTROINTESTINAL DISEASE Father      CANCER Father      Respiratory Father      HEART DISEASE Maternal Grandfather      DIABETES Paternal Grandfather      CANCER Brother      DIABETES Brother   "    HEART DISEASE Sister      Neurologic Disorder Sister      EPILEPSY     Allergies Son      HEART DISEASE Brother      TRIPLE BYPASS     Lipids Brother      Hypertension Brother      Chronic Obstructive Pulmonary Disease Brother      Glaucoma No family hx of      Macular Degeneration No family hx of        Review of Systems - As per HPI and PMHx, otherwise 7 system review of the head and neck negative.    Physical Exam  Resp 12  Ht 1.6 m (5' 3\")  Wt 122.9 kg (271 lb)  BMI 48.01 kg/m2  General - The patient is in no distress.  Alert and oriented x3, answers questions and cooperates with examination appropriately.   Voice and Breathing - The patient was breathing comfortably without the use of accessory muscles. There was no wheezing, stridor, or stertor.  The patients voice was clear and strong, with no dysphonia.  Head and Face - Normocephalic and atraumatic.    Eyes - Extraocular movements intact. Sclera were not icteric or injected, conjunctiva were pink and moist.  Neurologic - Cranial nerves II-XII are grossly intact. Specifically, the facial nerve is intact, House-Brackmann grade 1 of 6.   Nose - No significant external deformity. The nasal mucosa along the anterior septum on the left side shows a pumping arterial bleeding. Blood was dripping down her face. The right side was mostly normal.  The septum was midline, turbinates are of normal size and position. Clots in left side. See below.  Mouth - postnasal drainage of blood and clots.   Neck -  No masses, erythema, or rashes.    Procedure - I sprayed the left anterior nasal cavity and septum with phenylephrine and lidocaine. I sucitoned lots of clot. I applied 4 sticks of silver nitrate to the prominent blood vessel that was pumping along the anterior septum. Hemostasis was achieved. I applied bacitracin ointment to the wound with a q-tip.    A/P - Rozina Bell is a 67 year old female with epistaxis. She had a pumper along the inferior aspect of the " left anterior septum. We discussed restrictions on manipulation and picking of the nose. I explained using vaseline twice daily to the anterior septum, nasal saline spray 3-5 times per day, and a humidifier at the bedside at night.    I also explained applying digital pressure to the nasal tip for a minimum of 15-20 minutes to stop a nose bleed. If that doesn't stop the bleeding the patient needs to proceed to the nearest emergency department. I will see the patient back in 2-4 weeks.     Marcos Dias MD  Otolaryngology  [facility]

## 2017-12-27 NOTE — PATIENT INSTRUCTIONS
General Scheduling Information  To schedule your CT/MRI scan, please contact [facility] at [phone]   [address] [place] [address]    To schedule your Surgery, please contact our Specialty Schedulers at [phone]    ENT Clinic Locations Clinic Hours Telephone Number     Daria Ash  [address] [place] [address]   Tuesday:       8:00am -- 4:00pm    Wednesday:  8:00am - 4:00pm   To schedule an appointment with   Dr. Dias,   please contact our   Specialty Scheduling Department at:     [phone]       Daria Wharton  [address] [place] [address]   Friday:          8:00am - 4:00pm         Urgent Care Locations Clinic Hours Telephone Numbers     Daria Boateng  [address] [place] [address]     Monday-Friday:     11:00pm - 9:00pm    Saturday-Sunday:  9:00am - 5:00pm   [phone]     Daria Wharton  [address] [place] [address]     Monday-Friday:      5:00pm - 9:00pm     Saturday-Sunday:  9:00am - 5:00pm   [phone]

## 2017-12-28 ENCOUNTER — TELEPHONE (OUTPATIENT)
Dept: ORTHOPEDICS | Facility: CLINIC | Age: 67
End: 2017-12-28

## 2017-12-28 DIAGNOSIS — M21.40 FLAT FOOT: Primary | ICD-10-CM

## 2017-12-28 NOTE — TELEPHONE ENCOUNTER
Patient called requesting new orthotic orders be placed. She last received them 1 year ago from Dr. Max. [facility] and faxed to [phone]. Patient notified.

## 2017-12-29 ENCOUNTER — TELEPHONE (OUTPATIENT)
Dept: PALLIATIVE MEDICINE | Facility: CLINIC | Age: 67
End: 2017-12-29

## 2017-12-29 NOTE — TELEPHONE ENCOUNTER
Patient did not answer, left message to call us if there is any concerns  Lucero Ernandez RT (R)

## 2018-01-03 ENCOUNTER — OFFICE VISIT (OUTPATIENT)
Dept: OTOLARYNGOLOGY | Facility: CLINIC | Age: 68
End: 2018-01-03
Payer: COMMERCIAL

## 2018-01-03 VITALS — BODY MASS INDEX: 48.02 KG/M2 | TEMPERATURE: 98.1 F | WEIGHT: 271 LBS | HEIGHT: 63 IN

## 2018-01-03 DIAGNOSIS — R04.0 EPISTAXIS: Primary | ICD-10-CM

## 2018-01-03 PROCEDURE — 99024 POSTOP FOLLOW-UP VISIT: CPT | Performed by: OTOLARYNGOLOGY

## 2018-01-03 NOTE — PATIENT INSTRUCTIONS
General Scheduling Information  To schedule your CT/MRI scan, please contact [facility] at [phone]   [address] [place] [address]    To schedule your Surgery, please contact our Specialty Schedulers at [phone]    ENT Clinic Locations Clinic Hours Telephone Number     Daria Ash  [address] [place] [address]   Tuesday:       8:00am -- 4:00pm    Wednesday:  8:00am - 4:00pm   To schedule an appointment with   Dr. Dias,   please contact our   Specialty Scheduling Department at:     [phone]       Daria Wharton  [address] [place] [address]   Friday:          8:00am - 4:00pm         Urgent Care Locations Clinic Hours Telephone Numbers     Daria Boateng  [address] [place] [address]     Monday-Friday:     11:00pm - 9:00pm    Saturday-Sunday:  9:00am - 5:00pm   [phone]     Daria Wharton  [address] [place] [address]     Monday-Friday:      5:00pm - 9:00pm     Saturday-Sunday:  9:00am - 5:00pm   [phone]

## 2018-01-03 NOTE — PROGRESS NOTES
Chief Complaint - Epistaxis    History of Present Illness - Rozina Bell is a 67 year old female with recent onset epistaxis. It occurs on the left side. It usually only comes out the front of the nose, but it has ran down the back of the throat at times. It has been going on for two months, but worsened recently. Last week she was bleeding severely and I cauterized and stopped it. She returns. No more bleeding.  The patient has tried stopping flonase and AYR gel.      Past Medical History -   Patient Active Problem List   Diagnosis     Hypothyroidism     Allergic state     Spinal stenosis     CARDIOVASCULAR SCREENING; LDL GOAL LESS THAN 160     Fibromyalgia     Obesity     AK (actinic keratosis)     Hypertension goal BP (blood pressure) < 140/90     Skin cancer     Advanced directives, counseling/discussion     Degenerative spondylolisthesis     Metatarsal fracture     Prepatellar bursitis     MMT (medial meniscus tear)     Lumbar spinal stenosis     Hypovitaminosis D     Primary osteoarthritis of both first carpometacarpal joints     Morbid obesity (H)     Hip pain, left       Current Medications -   Current Outpatient Prescriptions:      hydrOXYzine (ATARAX) 25 MG tablet, Take 1 tablet (25 mg) by mouth 2 times daily as needed for itching, Disp: 60 tablet, Rfl: 3     SYNTHROID 50 MCG tablet, Take 1 tablet (50 mcg) by mouth daily, Disp: 90 tablet, Rfl: 3     meloxicam (MOBIC) 15 MG tablet, TAKE 1 TABLET BY MOUTH EVERY DAY., Disp: 90 tablet, Rfl: 3     levothyroxine (SYNTHROID/LEVOTHROID) 50 MCG tablet, Take 1 tablet (50 mcg) by mouth daily Patient would like to see the price difference between synthroid and levothyroxine, Disp: 90 tablet, Rfl: 3     order for DME, Equipment being ordered: [id number] Thumb Splint[place] [facility] [id number] Thumb splint[place] [facility] MD, Disp: 1 Device, Rfl: 0     Ascorbic Acid (VITAMIN C PO), Take 500 mg by mouth daily, Disp: , Rfl:      Cholecalciferol (VITAMIN D) 1000 UNITS capsule,  Take 2,000 Units by mouth daily, Disp: 180 capsule, Rfl: 4     magnesium oxide 400 MG CAPS, Take 1 tablet by mouth 3 times daily, Disp: 270 capsule, Rfl: 4     ORDER FOR DME, Equipment being ordered: compression stocking joey, Disp: 1 Device, Rfl: 0     MULTI-VITAMIN OR, None Entered, Disp: , Rfl:      CALCIUM + D 600-200 MG-UNIT OR TABS, 1 TABLET TWICE  DAILY, Disp: , Rfl:      VITAMIN E OR, 1 TABLET DAILY, Disp: , Rfl:      GLUCOSAMINE CHONDROITIN OR TABS, 1 TABLET DAILY, Disp: , Rfl:     Allergies -   Allergies   Allergen Reactions     Amitriptyline      jittery     Celebrex [Celecoxib]      Epinephrine-Lidocaine-Na Metabisulfite Other (See Comments)     Palpitations, flushing     Latex      Morphine      Penicillins      Sulfa Drugs      Ultram [Tramadol Hcl]      Yellow Dye      Zithromax [Azithromycin]        Social History -   Social History     Social History     Marital status:      Spouse name: N/A     Number of children: N/A     Years of education: N/A     Social History Main Topics     Smoking status: Never Smoker     Smokeless tobacco: Never Used      Comment: Lives in smoke free household     Alcohol use No     Drug use: No     Sexual activity: Not Currently     Partners: Male     Other Topics Concern     None     Social History Narrative       Family History -   Family History   Problem Relation Age of Onset     HEART DISEASE Mother      Arthritis Mother      Cardiovascular Mother      Thyroid Disease Mother      Hypertension Mother      CEREBROVASCULAR DISEASE Mother      GASTROINTESTINAL DISEASE Father      CANCER Father      Respiratory Father      HEART DISEASE Maternal Grandfather      DIABETES Paternal Grandfather      CANCER Brother      DIABETES Brother      HEART DISEASE Sister      Neurologic Disorder Sister      EPILEPSY     Allergies Son      HEART DISEASE Brother      TRIPLE BYPASS     Lipids Brother      Hypertension Brother      Chronic Obstructive Pulmonary Disease Brother   "    Glaucoma No family hx of      Macular Degeneration No family hx of        Review of Systems - As per HPI and PMHx, otherwise 7 system review of the head and neck negative.    Physical Exam  Temp 98.1  F (36.7  C) (Tympanic)  Ht 1.6 m (5' 3\")  Wt 122.9 kg (271 lb)  BMI 48.01 kg/m2  General - The patient is in no distress.  Alert and oriented x3, answers questions and cooperates with examination appropriately.   Voice and Breathing - The patient was breathing comfortably without the use of accessory muscles. There was no wheezing, stridor, or stertor.  The patients voice was clear and strong, with no dysphonia.  Head and Face - Normocephalic and atraumatic.    Eyes - Extraocular movements intact. Sclera were not icteric or injected, conjunctiva were pink and moist.  Neurologic - Cranial nerves II-XII are grossly intact. Specifically, the facial nerve is intact, House-Brackmann grade 1 of 6.   Nose - No significant external deformity. The nasal mucosa along the anterior septum on the left side shows healing. No blood. Palpation revealed no bleeding. The right side was mostly normal.  The septum was midline, turbinates are of normal size and position.       A/P - Rozina Bell is a 67 year old female with epistaxis. She had a pumper along the inferior aspect of the left anterior septum I cauterized last visit no more bleeding. We discussed restrictions on manipulation and picking of the nose. I explained using vaseline twice daily to the anterior septum, nasal saline spray 3-5 times per day, and a humidifier at the bedside at night.    I also explained applying digital pressure to the nasal tip for a minimum of 15-20 minutes to stop a nose bleed. If that doesn't stop the bleeding the patient needs to proceed to the nearest emergency department.    Marcos Dias MD  Otolaryngology  [facility]    "

## 2018-01-03 NOTE — NURSING NOTE
"Chief Complaint   Patient presents with     RECHECK     on epistaxis     Ear Problem     making weird noises since the cauterizing       Initial Temp 98.1  F (36.7  C) (Tympanic)  Ht 1.6 m (5' 3\")  Wt 122.9 kg (271 lb)  BMI 48.01 kg/m2 Estimated body mass index is 48.01 kg/(m^2) as calculated from the following:    Height as of this encounter: 1.6 m (5' 3\").    Weight as of this encounter: 122.9 kg (271 lb).  Medication Reconciliation: complete     Nica Ballesteros, [facility]      "

## 2018-01-03 NOTE — LETTER
1/3/2018         RE: Rozina Bell  [address] [address]  [place] [address]        Dear Colleague,    Thank you for referring your patient, Rozina Bell, to the [facility]. Please see a copy of my visit note below.    Chief Complaint - Epistaxis    History of Present Illness - Rozina Bell is a 67 year old female with recent onset epistaxis. It occurs on the left side. It usually only comes out the front of the nose, but it has ran down the back of the throat at times. It has been going on for two months, but worsened recently. Last week she was bleeding severely and I cauterized and stopped it. She returns. No more bleeding.  The patient has tried stopping flonase and AYR gel.      Past Medical History -   Patient Active Problem List   Diagnosis     Hypothyroidism     Allergic state     Spinal stenosis     CARDIOVASCULAR SCREENING; LDL GOAL LESS THAN 160     Fibromyalgia     Obesity     AK (actinic keratosis)     Hypertension goal BP (blood pressure) < 140/90     Skin cancer     Advanced directives, counseling/discussion     Degenerative spondylolisthesis     Metatarsal fracture     Prepatellar bursitis     MMT (medial meniscus tear)     Lumbar spinal stenosis     Hypovitaminosis D     Primary osteoarthritis of both first carpometacarpal joints     Morbid obesity (H)     Hip pain, left       Current Medications -   Current Outpatient Prescriptions:      hydrOXYzine (ATARAX) 25 MG tablet, Take 1 tablet (25 mg) by mouth 2 times daily as needed for itching, Disp: 60 tablet, Rfl: 3     SYNTHROID 50 MCG tablet, Take 1 tablet (50 mcg) by mouth daily, Disp: 90 tablet, Rfl: 3     meloxicam (MOBIC) 15 MG tablet, TAKE 1 TABLET BY MOUTH EVERY DAY., Disp: 90 tablet, Rfl: 3     levothyroxine (SYNTHROID/LEVOTHROID) 50 MCG tablet, Take 1 tablet (50 mcg) by mouth daily Patient would like to see the price difference between synthroid and levothyroxine, Disp: 90 tablet, Rfl: 3     order for DME,  Equipment being ordered: [id number] Thumb Splint, CMC RT, MD  [id number] Thumb splint, [facility] [place], LT MD, Disp: 1 Device, Rfl: 0     Ascorbic Acid (VITAMIN C PO), Take 500 mg by mouth daily, Disp: , Rfl:      Cholecalciferol (VITAMIN D) 1000 UNITS capsule, Take 2,000 Units by mouth daily, Disp: 180 capsule, Rfl: 4     magnesium oxide 400 MG CAPS, Take 1 tablet by mouth 3 times daily, Disp: 270 capsule, Rfl: 4     ORDER FOR DME, Equipment being ordered: compression stocking joey, Disp: 1 Device, Rfl: 0     MULTI-VITAMIN OR, None Entered, Disp: , Rfl:      CALCIUM + D 600-200 MG-UNIT OR TABS, 1 TABLET TWICE  DAILY, Disp: , Rfl:      VITAMIN E OR, 1 TABLET DAILY, Disp: , Rfl:      GLUCOSAMINE CHONDROITIN OR TABS, 1 TABLET DAILY, Disp: , Rfl:     Allergies -   Allergies   Allergen Reactions     Amitriptyline      jittery     Celebrex [Celecoxib]      Epinephrine-Lidocaine-Na Metabisulfite Other (See Comments)     Palpitations, flushing     Latex      Morphine      Penicillins      Sulfa Drugs      Ultram [Tramadol Hcl]      Yellow Dye      Zithromax [Azithromycin]        Social History -   Social History     Social History     Marital status:      Spouse name: N/A     Number of children: N/A     Years of education: N/A     Social History Main Topics     Smoking status: Never Smoker     Smokeless tobacco: Never Used      Comment: Lives in smoke free household     Alcohol use No     Drug use: No     Sexual activity: Not Currently     Partners: Male     Other Topics Concern     None     Social History Narrative       Family History -   Family History   Problem Relation Age of Onset     HEART DISEASE Mother      Arthritis Mother      Cardiovascular Mother      Thyroid Disease Mother      Hypertension Mother      CEREBROVASCULAR DISEASE Mother      GASTROINTESTINAL DISEASE Father      CANCER Father      Respiratory Father      HEART DISEASE Maternal Grandfather      DIABETES Paternal Grandfather      CANCER Brother       "DIABETES Brother      HEART DISEASE Sister      Neurologic Disorder Sister      EPILEPSY     Allergies Son      HEART DISEASE Brother      TRIPLE BYPASS     Lipids Brother      Hypertension Brother      Chronic Obstructive Pulmonary Disease Brother      Glaucoma No family hx of      Macular Degeneration No family hx of        Review of Systems - As per HPI and PMHx, otherwise 7 system review of the head and neck negative.    Physical Exam  Temp 98.1  F (36.7  C) (Tympanic)  Ht 1.6 m (5' 3\")  Wt 122.9 kg (271 lb)  BMI 48.01 kg/m2  General - The patient is in no distress.  Alert and oriented x3, answers questions and cooperates with examination appropriately.   Voice and Breathing - The patient was breathing comfortably without the use of accessory muscles. There was no wheezing, stridor, or stertor.  The patients voice was clear and strong, with no dysphonia.  Head and Face - Normocephalic and atraumatic.    Eyes - Extraocular movements intact. Sclera were not icteric or injected, conjunctiva were pink and moist.  Neurologic - Cranial nerves II-XII are grossly intact. Specifically, the facial nerve is intact, House-Brackmann grade 1 of 6.   Nose - No significant external deformity. The nasal mucosa along the anterior septum on the left side shows healing. No blood. Palpation revealed no bleeding. The right side was mostly normal.  The septum was midline, turbinates are of normal size and position.       A/P - Rozina Bell is a 67 year old female with epistaxis. She had a pumper along the inferior aspect of the left anterior septum I cauterized last visit no more bleeding. We discussed restrictions on manipulation and picking of the nose. I explained using vaseline twice daily to the anterior septum, nasal saline spray 3-5 times per day, and a humidifier at the bedside at night.    I also explained applying digital pressure to the nasal tip for a minimum of 15-20 minutes to stop a nose bleed. If that doesn't " stop the bleeding the patient needs to proceed to the nearest emergency department.    Marcos Dias MD  Otolaryngology  [facility]      Again, thank you for allowing me to participate in the care of your patient.        Sincerely,        Marcos Dias MD

## 2018-01-03 NOTE — MR AVS SNAPSHOT
After Visit Summary   1/3/2018    Rozina Bell    MRN: [id number]           Patient Information     Date Of Birth          1950        Visit Information        Provider Department      1/3/2018 11:15 AM Marcos Dias MD [facility]        Today's Diagnoses     Epistaxis    -  1      Care Instructions    General Scheduling Information  To schedule your CT/MRI scan, please contact [place] [facility] at [phone] [address] [place] [address]    To schedule your Surgery, please contact our Specialty Schedulers at [phone]    ENT Clinic Locations Clinic Hours Telephone Number     [facility]  [address]  [place] [address]   Tuesday:       8:00am -- 4:00pm    Wednesday:  8:00am - 4:00pm   To schedule an appointment with   Dr. Dias,   please contact our   Specialty Scheduling Department at:     [phone]       [facility]  [address] [place] [address]   Friday:          8:00am - 4:00pm         Urgent Care Locations Clinic Hours Telephone Numbers     [facility] [place]  [address] [place] [address]     Monday-Friday:     11:00pm - 9:00pm    Saturday-Sunday:  9:00am - 5:00pm   [phone]     [facility] [place]  [address] [place] [address]     Monday-Friday:      5:00pm - 9:00pm     Saturday-Sunday:  9:00am - 5:00pm   [phone]                 Follow-ups after your visit        Who to contact     If you have questions or need follow up information about today's clinic visit or your schedule please contact [facility] directly at [phone].  Normal or non-critical lab and imaging results will be communicated to you by MyChart, letter or phone within 4 business days after the clinic has received the results. If you do not hear from us within 7 days, please contact the clinic through MyChart or phone. If you have a critical or abnormal lab result, we will notify you by phone as soon  "as possible.  Submit refill requests through [employer] or call your pharmacy and they will forward the refill request to us. Please allow 3 business days for your refill to be completed.          Additional Information About Your Visit        [employer] Information     [employer] lets you send messages to your doctor, view your test results, renew your prescriptions, schedule appointments and more. To sign up, go to [email or web address] . Click on \"Log in\" on the left side of the screen, which will take you to the Welcome page. Then click on \"Sign up Now\" on the right side of the page.     You will be asked to enter the access code listed below, as well as some personal information. Please follow the directions to create your username and password.     Your access code is: [email or web address]  Expires: 2018  9:22 AM     Your access code will  in 90 days. If you need help or a new code, please call your [place] clinic or [phone].        Care EveryWhere ID     This is your Care EveryWhere ID. This could be used by other organizations to access your [place] medical records  [id number]        Your Vitals Were     Temperature Height BMI (Body Mass Index)             98.1  F (36.7  C) (Tympanic) 1.6 m (5' 3\") 48.01 kg/m2          Blood Pressure from Last 3 Encounters:   17 143/66   11/15/17 153/87   17 136/88    Weight from Last 3 Encounters:   18 122.9 kg (271 lb)   17 122.9 kg (271 lb)   17 122.9 kg (271 lb)              Today, you had the following     No orders found for display       Primary Care Provider Office Phone # Fax #    Deisy Sharpe [phone] [address] [facility] [place] [address]        Equal Access to Services     ALPESH CAMPBELL : [place], waeliceoda luqadaha, qaybta kanelson al . [facility] [phone].    ATENCIÓN: Si stanla lyle, tiene a perkins disposición servicios gratuitos " de asistencia lingüística. Ancelmo al [phone].    We comply with applicable federal civil rights laws and [place] laws. We do not discriminate on the basis of race, color, national origin, age, disability, sex, sexual orientation, or gender identity.            Thank you!     Thank you for choosing [facility]  for your care. Our goal is always to provide you with excellent care. Hearing back from our patients is one way we can continue to improve our services. Please take a few minutes to complete the written survey that you may receive in the mail after your visit with us. Thank you!             Your Updated Medication List - Protect others around you: Learn how to safely use, store and throw away your medicines at www.disposemymeds.org.          This list is accurate as of: 1/3/18  5:53 PM.  Always use your most recent med list.                   Brand Name Dispense Instructions for use Diagnosis    calcium + D 600-200 MG-UNIT Tabs   Generic drug:  calcium carbonate-vitamin D      1 TABLET TWICE  DAILY        GLUCOSAMINE CHONDROITIN Tabs      1 TABLET DAILY        hydrOXYzine 25 MG tablet    ATARAX    60 tablet    Take 1 tablet (25 mg) by mouth 2 times daily as needed for itching    Acute seasonal allergic rhinitis due to pollen       magnesium oxide 400 MG Caps     270 capsule    Take 1 tablet by mouth 3 times daily    Hypomagnesemia       meloxicam 15 MG tablet    MOBIC    90 tablet    TAKE 1 TABLET BY MOUTH EVERY DAY.    Spinal stenosis, unspecified spinal region       MULTI-VITAMIN PO      None Entered        * order for DME     1 Device    Equipment being ordered: compression stocking joey    Edema       * order for DME     1 Device    Equipment being ordered: [id number] Thumb Splint, CMC RT, MD  [id number] Thumb splint, CMC, LT MD    Primary osteoarthritis of first carpometacarpal joint of right hand, Primary osteoarthritis of first carpometacarpal joint of left hand       * SYNTHROID  50 MCG tablet   Generic drug:  levothyroxine     90 tablet    Take 1 tablet (50 mcg) by mouth daily    Acquired hypothyroidism       * levothyroxine 50 MCG tablet    SYNTHROID/LEVOTHROID    90 tablet    Take 1 tablet (50 mcg) by mouth daily Patient would like to see the price difference between synthroid and levothyroxine    Hypothyroidism, unspecified type       VITAMIN C PO      Take 500 mg by mouth daily        vitamin D 1000 UNITS capsule     180 capsule    Take 2,000 Units by mouth daily    Hypovitaminosis D       VITAMIN E PO      1 TABLET DAILY        * Notice:  This list has 4 medication(s) that are the same as other medications prescribed for you. Read the directions carefully, and ask your doctor or other care provider to review them with you.

## 2018-01-09 ENCOUNTER — TELEPHONE (OUTPATIENT)
Dept: INTERNAL MEDICINE | Facility: CLINIC | Age: 68
End: 2018-01-09

## 2018-01-09 NOTE — TELEPHONE ENCOUNTER
PA was initiated by ENT team through [email or web address] per Dr. Dias.    Nica Ballesteros, CMA

## 2018-01-09 NOTE — TELEPHONE ENCOUNTER
Received fax from pharmacy stating patient requires Prior Authorization for Hydroxyzine hcl.     Insurance information:   Name: [facility]   Phone number: [phone]   ID number: [id number]    Initiate prior authorization or change medication?    If a prior authorization is to be initiated, please list the following:    -any medications the patient has tried and failed or any contraindications.  -is the patient currently on this medication, or has tried before?  -What is the diagnosis?  -Justification or other information that may be helpful.

## 2018-01-16 NOTE — TELEPHONE ENCOUNTER
I called and spoke with patient and informed her of PA denial. Patient was also informed she can contact her insurance or per primary MD for a different medication. Patient verbalized understanding to me of options.     Nica Ballesteros, CMA

## 2018-01-16 NOTE — TELEPHONE ENCOUNTER
PA for hydroxyzine HCL 25mg tab was denied due to the High Risk Medications Prior Authorization criteria. Prescriber must identify the benefits of this drug outweigh the risks.  If appeal is requested, can be reached by phone: [phone] TTY/THADDEUS: 151 Fax: [phone]    Nica Ballesteros, [facility]

## 2018-05-29 ENCOUNTER — TELEPHONE (OUTPATIENT)
Dept: FAMILY MEDICINE | Facility: CLINIC | Age: 68
End: 2018-05-29

## 2018-05-29 DIAGNOSIS — Z13.6 CARDIOVASCULAR SCREENING; LDL GOAL LESS THAN 160: ICD-10-CM

## 2018-05-29 DIAGNOSIS — E55.9 HYPOVITAMINOSIS D: ICD-10-CM

## 2018-05-29 DIAGNOSIS — Z13.1 SCREENING FOR DIABETES MELLITUS: ICD-10-CM

## 2018-05-29 DIAGNOSIS — Z79.899 HIGH RISK MEDICATION USE: ICD-10-CM

## 2018-05-29 DIAGNOSIS — E03.9 HYPOTHYROIDISM, UNSPECIFIED TYPE: Primary | ICD-10-CM

## 2018-05-29 DIAGNOSIS — I10 HYPERTENSION GOAL BP (BLOOD PRESSURE) < 140/90: ICD-10-CM

## 2018-07-25 ENCOUNTER — OFFICE VISIT (OUTPATIENT)
Dept: FAMILY MEDICINE | Facility: CLINIC | Age: 68
End: 2018-07-25
Payer: COMMERCIAL

## 2018-07-25 VITALS
TEMPERATURE: 98.9 F | RESPIRATION RATE: 14 BRPM | OXYGEN SATURATION: 98 % | SYSTOLIC BLOOD PRESSURE: 146 MMHG | HEART RATE: 80 BPM | DIASTOLIC BLOOD PRESSURE: 84 MMHG

## 2018-07-25 DIAGNOSIS — R14.0 GASSINESS: ICD-10-CM

## 2018-07-25 DIAGNOSIS — R53.83 FATIGUE, UNSPECIFIED TYPE: Primary | ICD-10-CM

## 2018-07-25 DIAGNOSIS — E66.01 MORBID OBESITY (H): ICD-10-CM

## 2018-07-25 PROCEDURE — 99213 OFFICE O/P EST LOW 20 MIN: CPT | Performed by: FAMILY MEDICINE

## 2018-07-25 NOTE — PROGRESS NOTES
SUBJECTIVE:   Rozina Bell is a 68 year old female who presents to clinic today for the following health issues:    Chief Complaint   Patient presents with     Not Feeling Well     Fatigue, and Gas     Fatigue:   Brother passed 10/2017 and been checking on his house regularly and been buying [employer] salads.  Also has to go bathroom every 2 hours.    HTN;    Says has white coat HTN  BP Readings from Last 6 Encounters:   07/25/18 146/84   12/21/17 143/66   11/15/17 153/87   11/06/17 136/88   08/16/17 132/72   07/17/17 134/86     Weight:   Stable  Wt Readings from Last 4 Encounters:   01/03/18 271 lb (122.9 kg)   12/27/17 271 lb (122.9 kg)   11/06/17 271 lb (122.9 kg)   08/16/17 276 lb (125.2 kg)     Problem list and histories reviewed & adjusted, as indicated.  Additional history: as documented    Patient Active Problem List   Diagnosis     Hypothyroidism     Allergic state     Spinal stenosis     CARDIOVASCULAR SCREENING; LDL GOAL LESS THAN 160     Fibromyalgia     Obesity     AK (actinic keratosis)     Hypertension goal BP (blood pressure) < 140/90     Skin cancer     Advanced directives, counseling/discussion     Degenerative spondylolisthesis     Metatarsal fracture     Prepatellar bursitis     MMT (medial meniscus tear)     Lumbar spinal stenosis     Hypovitaminosis D     Primary osteoarthritis of both first carpometacarpal joints     Morbid obesity (H)     Hip pain, left     Past Surgical History:   Procedure Laterality Date     BREAST BIOPSY, RT/LT  8/08    Breat Biopsy RT/LT     C VAG HYST,RMV TUBE/OVARY  age 34    endometriosis     HC REMOVAL GALLBLADDER  age 31     HYSTERECTOMY, PAP NO LONGER INDICATED         Social History   Substance Use Topics     Smoking status: Never Smoker     Smokeless tobacco: Never Used      Comment: Lives in smoke free household     Alcohol use No     Family History   Problem Relation Age of Onset     HEART DISEASE Mother      Arthritis Mother      Cardiovascular Mother       Thyroid Disease Mother      Hypertension Mother      Cerebrovascular Disease Mother      GASTROINTESTINAL DISEASE Father      Cancer Father      Respiratory Father      HEART DISEASE Maternal Grandfather      Diabetes Paternal Grandfather      Cancer Brother      Diabetes Brother      HEART DISEASE Sister      Neurologic Disorder Sister      EPILEPSY     Allergies Son      HEART DISEASE Brother      TRIPLE BYPASS     Lipids Brother      Hypertension Brother      Chronic Obstructive Pulmonary Disease Brother      Glaucoma No family hx of      Macular Degeneration No family hx of          Reviewed and updated as needed this visit by clinical staff  Tobacco  Allergies  Meds  Med Hx  Surg Hx  Fam Hx  Soc Hx      ROS:   HEENT, cardiovascular, pulmonary and gu systems are negative, except as otherwise noted.    OBJECTIVE:     /84 (BP Location: Left arm, Patient Position: Chair, Cuff Size: Adult Regular)  Pulse 80  Temp 98.9  F (37.2  C) (Oral)  Resp 14  SpO2 98%  Breastfeeding? No  There is no height or weight on file to calculate BMI.  GENERAL: healthy, obese,alert and no distress  NECK: no adenopathy and thyroid normal to palpation  RESP: lungs clear to auscultation - no rales, rhonchi or wheezes  CV: regular rate and rhythm, no murmur, click or rub, no peripheral edema.  ABDOMEN: soft, nontender, no masses and bowel sounds normal  MS: no gross musculoskeletal defects noted, no edema    ASSESSMENT/PLAN:     (R53.83) Fatigue, unspecified type  (primary encounter diagnosis)  Comment: Could be a viral process.   Plan: Anticipate spontaneous resolution.    (R14.0) Gassiness  Comment: Concerned about a parasitic infection because of eating out.  Plan: Cryptosporidium in stool stain    (E66.01) Morbid obesity (H)  Comment: Stable    Luiz Gregory MD  [facility]

## 2018-07-25 NOTE — PATIENT INSTRUCTIONS
[facility]    If you have any questions regarding to your visit please contact your care team:       Team Purple:   Clinic Hours Telephone Number   Dr. Esther Martínez   7am-7pm  Monday - Thursday   7am-5pm  Fridays  [phone]  (Appointment scheduling available 24/7)    Questions about your recent visit?   Team Line:  [phone]   Urgent Care - [place] [facility] - 11am-9pm Monday-Friday Saturday-Sunday- 9am-5pm   [place] - 5pm-9pm Monday-Friday Saturday-Sunday- 9am-5pm  [phone] - [place]  [phone] - [place]       What options do I have for a visit other than an office visit? We offer electronic visits (e-visits) and telephone visits, when medically appropriate.  Please check with your medical insurance to see if these types of visits are covered, as you will be responsible for any charges that are not paid by your insurance.      You can use [employer] (secure electronic communication) to access to your chart, send your primary care provider a message, or make an appointment. Ask a team member how to get started.     For a price quote for your services, please call our Consumer Price Line at [phone] or our Imaging Cost estimation line at [phone] (for imaging tests).    Frank Mackay

## 2018-07-25 NOTE — MR AVS SNAPSHOT
After Visit Summary   7/25/2018    Rozina Bell    MRN: [id number]           Patient Information     Date Of Birth          1950        Visit Information        Provider Department      7/25/2018 2:00 PM Luiz Gregory MD [facility]        Today's Diagnoses     Fatigue, unspecified type    -  1    Gassiness        Morbid obesity (H)          Care Instructions    [facility]    If you have any questions regarding to your visit please contact your care team:       Team Purple:   Clinic Hours Telephone Number   Dr. Esther Martínez   7am-7pm  Monday - Thursday   7am-5pm  Fridays  [phone]  (Appointment scheduling available 24/7)    Questions about your recent visit?   Team Line:  [phone]   Urgent Care - [place] [facility] - 11am-9pm Monday-Friday Saturday-Sunday- 9am-5pm   [place] - 5pm-9pm Monday-Friday Saturday-Sunday- 9am-5pm  [phone] - [place]  [phone] - [place]       What options do I have for a visit other than an office visit? We offer electronic visits (e-visits) and telephone visits, when medically appropriate.  Please check with your medical insurance to see if these types of visits are covered, as you will be responsible for any charges that are not paid by your insurance.      You can use [email or web address] (secure electronic communication) to access to your chart, send your primary care provider a message, or make an appointment. Ask a team member how to get started.     For a price quote for your services, please call our Consumer Price Line at [phone] or our Imaging Cost estimation line at [phone] (for imaging tests).    Frank Mackay            Follow-ups after your visit        Your next 10 appointments already scheduled     Nov 12, 2018 11:00 AM CST   PHYSICAL with Deisy Sharpe MD   [facility] ([facility]) [address]  [place] [address]   [phone]              Future tests that were ordered for you today     Open Future Orders        Priority Expected Expires Ordered    Cryptosporidium in stool stain Routine  7/25/2019 7/25/2018            Who to contact     If you have questions or need follow up information about today's clinic visit or your schedule please contact [facility] directly at [phone].  Normal or non-critical lab and imaging results will be communicated to you by MyChart, letter or phone within 4 business days after the clinic has received the results. If you do not hear from us within 7 days, please contact the clinic through MyChart or phone. If you have a critical or abnormal lab result, we will notify you by phone as soon as possible.  Submit refill requests through [email or web address] or call your pharmacy and they will forward the refill request to us. Please allow 3 business days for your refill to be completed.          Additional Information About Your Visit        Care EveryWhere ID     This is your Care EveryWhere ID. This could be used by other organizations to access your [place] medical records  [id number]        Your Vitals Were     Pulse Temperature Respirations Pulse Oximetry Breastfeeding?       80 98.9  F (37.2  C) (Oral) 14 98% No        Blood Pressure from Last 3 Encounters:   07/25/18 146/84   12/21/17 143/66   11/15/17 153/87    Weight from Last 3 Encounters:   01/03/18 271 lb (122.9 kg)   12/27/17 271 lb (122.9 kg)   11/06/17 271 lb (122.9 kg)               Primary Care Provider Office Phone # Fax #    Deisy Sharpe [phone]       [address]  [place] [address]        Equal Access to Services     [facility] ADRIAN : Hadii sayra Hale, waeliceoda luqadaha, qaybta kaalmada vahe, nelson copeland. So [facility] [phone].    ATENCIÓN: Si habla español, tiene a perkins disposición servicios gratuitos de  deea lingüística. Ancelmo al [phone].    We comply with applicable federal civil rights laws and [place] laws. We do not discriminate on the basis of race, color, national origin, age, disability, sex, sexual orientation, or gender identity.            Thank you!     Thank you for choosing [facility]  for your care. Our goal is always to provide you with excellent care. Hearing back from our patients is one way we can continue to improve our services. Please take a few minutes to complete the written survey that you may receive in the mail after your visit with us. Thank you!             Your Updated Medication List - Protect others around you: Learn how to safely use, store and throw away your medicines at www.disposemymeds.org.          This list is accurate as of 7/25/18  2:21 PM.  Always use your most recent med list.                   Brand Name Dispense Instructions for use Diagnosis    calcium + D 600-200 MG-UNIT Tabs   Generic drug:  calcium carbonate-vitamin D      1 TABLET TWICE  DAILY        GLUCOSAMINE CHONDROITIN Tabs      1 TABLET DAILY        hydrOXYzine 25 MG tablet    ATARAX    60 tablet    Take 1 tablet (25 mg) by mouth 2 times daily as needed for itching    Acute seasonal allergic rhinitis due to pollen       magnesium oxide 400 MG Caps     270 capsule    Take 1 tablet by mouth 3 times daily    Hypomagnesemia       meloxicam 15 MG tablet    MOBIC    90 tablet    TAKE 1 TABLET BY MOUTH EVERY DAY.    Spinal stenosis, unspecified spinal region       MULTI-VITAMIN PO      None Entered        order for DME     1 Device    Equipment being ordered: compression stocking joey    Edema       order for DME     1 Device    Equipment being ordered: [id number] Thumb Splint, CMC RT, MD  [id number] Thumb splint, CMC, LT MD    Primary osteoarthritis of first carpometacarpal joint of right hand, Primary osteoarthritis of first carpometacarpal joint of left hand       * SYNTHROID 50 MCG  tablet   Generic drug:  levothyroxine     90 tablet    Take 1 tablet (50 mcg) by mouth daily    Acquired hypothyroidism       * levothyroxine 50 MCG tablet    SYNTHROID/LEVOTHROID    90 tablet    Take 1 tablet (50 mcg) by mouth daily Patient would like to see the price difference between synthroid and levothyroxine    Hypothyroidism, unspecified type       VITAMIN C PO      Take 500 mg by mouth daily        vitamin D 1000 units capsule     180 capsule    Take 2,000 Units by mouth daily    Hypovitaminosis D       VITAMIN E PO      1 TABLET DAILY        * Notice:  This list has 2 medication(s) that are the same as other medications prescribed for you. Read the directions carefully, and ask your doctor or other care provider to review them with you.

## 2018-07-25 NOTE — NURSING NOTE
"Chief Complaint   Patient presents with     Not Feeling Well     Fatigue, and Gas     Initial /84 (BP Location: Left arm, Patient Position: Chair, Cuff Size: Adult Regular)  Pulse 80  Temp 98.9  F (37.2  C) (Oral)  Resp 14  SpO2 98%  Breastfeeding? No Estimated body mass index is 48.01 kg/(m^2) as calculated from the following:    Height as of 1/3/18: 5' 3\" (1.6 m).    Weight as of 1/3/18: 271 lb (122.9 kg).  BP completed using cuff size: regular    Frank Mackay  "

## 2018-07-26 DIAGNOSIS — R53.83 FATIGUE, UNSPECIFIED TYPE: ICD-10-CM

## 2018-07-26 DIAGNOSIS — R14.0 GASSINESS: ICD-10-CM

## 2018-07-26 PROCEDURE — 87206 SMEAR FLUORESCENT/ACID STAI: CPT | Performed by: FAMILY MEDICINE

## 2018-07-29 LAB
O+P STL CONC: NORMAL
O+P STL CONC: NORMAL
SPECIMEN SOURCE: NORMAL

## 2018-07-31 ENCOUNTER — TELEPHONE (OUTPATIENT)
Dept: FAMILY MEDICINE | Facility: CLINIC | Age: 68
End: 2018-07-31

## 2018-07-31 DIAGNOSIS — R19.7 DIARRHEA, UNSPECIFIED TYPE: Primary | ICD-10-CM

## 2018-07-31 NOTE — TELEPHONE ENCOUNTER
Patient calling for results on Cryptosporidium, advised these were negative.  She is having more gas, bloating, and burping every day.  She is wondering if she should be tested for other parasites given symptoms.  She was eating at [employer] and having salads almost every day which is why she was tested for Cryptosporidium.   Please advise   Sunitha Lawson RN

## 2018-08-03 DIAGNOSIS — R19.7 DIARRHEA, UNSPECIFIED TYPE: ICD-10-CM

## 2018-08-03 PROCEDURE — 87506 IADNA-DNA/RNA PROBE TQ 6-11: CPT | Performed by: FAMILY MEDICINE

## 2018-09-25 ENCOUNTER — OFFICE VISIT (OUTPATIENT)
Dept: OTOLARYNGOLOGY | Facility: CLINIC | Age: 68
End: 2018-09-25
Payer: COMMERCIAL

## 2018-09-25 ENCOUNTER — TELEPHONE (OUTPATIENT)
Dept: INTERNAL MEDICINE | Facility: CLINIC | Age: 68
End: 2018-09-25

## 2018-09-25 VITALS
HEART RATE: 96 BPM | HEIGHT: 63 IN | DIASTOLIC BLOOD PRESSURE: 90 MMHG | BODY MASS INDEX: 48.01 KG/M2 | SYSTOLIC BLOOD PRESSURE: 184 MMHG

## 2018-09-25 DIAGNOSIS — R07.0 THROAT PAIN: ICD-10-CM

## 2018-09-25 DIAGNOSIS — J32.0 CHRONIC MAXILLARY SINUSITIS: Primary | ICD-10-CM

## 2018-09-25 PROCEDURE — 31575 DIAGNOSTIC LARYNGOSCOPY: CPT | Performed by: OTOLARYNGOLOGY

## 2018-09-25 PROCEDURE — 99214 OFFICE O/P EST MOD 30 MIN: CPT | Mod: 25 | Performed by: OTOLARYNGOLOGY

## 2018-09-25 RX ORDER — DOXYCYCLINE 100 MG/1
100 CAPSULE ORAL 2 TIMES DAILY
Qty: 20 CAPSULE | Refills: 0 | Status: SHIPPED | OUTPATIENT
Start: 2018-09-25 | End: 2018-10-05

## 2018-09-25 NOTE — TELEPHONE ENCOUNTER
Patient returning call. Pharmacy suggested a new med that doesn't have yellow dye. New med doxycycline 100mg.     Call patient first. Then call in pharmacy.      [facility]   [phone]    Please advise.

## 2018-09-25 NOTE — TELEPHONE ENCOUNTER
Reason for Call:  Other call back    Detailed comments: zulema from [facility] pharm called: [phone] called.  Patient is allergic to doxycycline, penicillin, sulfa and yellow dye.  Please call to discuss alternative rx to doxycycline.  Zulema: [phone]    Phone Number Patient can be reached at: Home number on file [phone] (home)    Best Time: any    Can we leave a detailed message on this number? NO    Call taken on 9/25/2018 at 2:49 PM by Melinda Lance

## 2018-09-25 NOTE — PROGRESS NOTES
Chief Complaint - sore throat    History of Present Illness - Rozina Bell is a 68 year old female who presents with a history of sore throat. Feels it is in a general area, not a specific spot. This has been going on for months or more. Worse at night and in the morning. Has some yellow/green drainage she coughs up in the morning. She can't blow it out. Some epistaxis right side. Some face pain/headaches. No snoring or mouth breathing. Throat clears, but no cough. Voicing has seemed a little hoarse. They note no significant history of relux. They have tried nothing. No dysphagia. Nonsmoker.    Past Medical History -   Patient Active Problem List   Diagnosis     Hypothyroidism     Allergic state     Spinal stenosis     CARDIOVASCULAR SCREENING; LDL GOAL LESS THAN 160     Fibromyalgia     Obesity     AK (actinic keratosis)     Hypertension goal BP (blood pressure) < 140/90     Skin cancer     Advanced directives, counseling/discussion     Degenerative spondylolisthesis     Metatarsal fracture     Prepatellar bursitis     MMT (medial meniscus tear)     Lumbar spinal stenosis     Hypovitaminosis D     Primary osteoarthritis of both first carpometacarpal joints     Morbid obesity (H)     Hip pain, left       Current Medications -   Current Outpatient Prescriptions:      Ascorbic Acid (VITAMIN C PO), Take 500 mg by mouth daily, Disp: , Rfl:      CALCIUM + D 600-200 MG-UNIT OR TABS, 1 TABLET TWICE  DAILY, Disp: , Rfl:      Cholecalciferol (VITAMIN D) 1000 UNITS capsule, Take 2,000 Units by mouth daily, Disp: 180 capsule, Rfl: 4     GLUCOSAMINE CHONDROITIN OR TABS, 1 TABLET DAILY, Disp: , Rfl:      hydrOXYzine (ATARAX) 25 MG tablet, Take 1 tablet (25 mg) by mouth 2 times daily as needed for itching, Disp: 60 tablet, Rfl: 3     levothyroxine (SYNTHROID/LEVOTHROID) 50 MCG tablet, Take 1 tablet (50 mcg) by mouth daily Patient would like to see the price difference between synthroid and levothyroxine, Disp: 90 tablet, Rfl:  3     magnesium oxide 400 MG CAPS, Take 1 tablet by mouth 3 times daily, Disp: 270 capsule, Rfl: 4     meloxicam (MOBIC) 15 MG tablet, TAKE 1 TABLET BY MOUTH EVERY DAY., Disp: 90 tablet, Rfl: 3     MULTI-VITAMIN OR, None Entered, Disp: , Rfl:      order for DME, Equipment being ordered: [id number] Thumb Splint, CMC RT, MD  [id number] Thumb splint, CMC, LT MD, Disp: 1 Device, Rfl: 0     ORDER FOR DME, Equipment being ordered: compression stocking joey, Disp: 1 Device, Rfl: 0     SYNTHROID 50 MCG tablet, Take 1 tablet (50 mcg) by mouth daily, Disp: 90 tablet, Rfl: 3     VITAMIN E OR, 1 TABLET DAILY, Disp: , Rfl:     Allergies -   Allergies   Allergen Reactions     Amitriptyline      jittery     Celebrex [Celecoxib]      Epinephrine-Lidocaine-Na Metabisulfite Other (See Comments)     Palpitations, flushing     Latex      Morphine      Penicillins      Sulfa Drugs      Ultram [Tramadol Hcl]      Yellow Dye      Zithromax [Azithromycin]        Social History -   Social History     Social History     Marital status:      Spouse name: N/A     Number of children: N/A     Years of education: N/A     Social History Main Topics     Smoking status: Never Smoker     Smokeless tobacco: Never Used      Comment: Lives in smoke free household     Alcohol use No     Drug use: No     Sexual activity: Not Currently     Partners: Male     Other Topics Concern     None     Social History Narrative       Family History -   Family History   Problem Relation Age of Onset     HEART DISEASE Mother      Arthritis Mother      Cardiovascular Mother      Thyroid Disease Mother      Hypertension Mother      Cerebrovascular Disease Mother      GASTROINTESTINAL DISEASE Father      Cancer Father      Respiratory Father      HEART DISEASE Maternal Grandfather      Diabetes Paternal Grandfather      Cancer Brother      Diabetes Brother      HEART DISEASE Sister      Neurologic Disorder Sister      EPILEPSY     Allergies Son      HEART DISEASE  "Brother      TRIPLE BYPASS     Lipids Brother      Hypertension Brother      Chronic Obstructive Pulmonary Disease Brother      Glaucoma No family hx of      Macular Degeneration No family hx of      Review of Systems - As per HPI and PMHx, some face pain, otherwise 7 system review of the head and neck is negative.    Physical Exam  /90  Pulse 96  Ht 1.6 m (5' 3\")  BMI 48.01 kg/m2  General - The patient is in no distress. Alert and oriented to person and place, answers questions and cooperates with examination appropriately.   Voice and Breathing - The patient was breathing comfortably without the use of accessory muscles. There was no wheezing, stridor, or stertor.  The patients voice was clear and strong.  Eyes - Extraocular movements intact.  Sclera were not icteric or injected, conjunctiva were pink and moist.  Mouth - Examination of the oral cavity showed pink, healthy oral mucosa. No lesions or ulcerations noted.  The tongue was mobile and midline.  Throat - The walls of the oropharynx were smooth, symmetric, and had no lesions or ulcerations.  The tonsillar pillars and soft palate were symmetric.  The uvula was midline on elevation. Tonsils 1+, no erythema.   Nose - External contour is symmetric, no gross deflection or scars.  Nasal mucosa is pink and moist with no abnormal mucus.  The septum was midline and non-obstructive, turbinates of normal size and position.  No polyps, masses, or purulence noted on examination.  Neck -  Some tenderness in level 1&2. Palpation of the occipital, submental, submandibular, internal jugular chain, and supraclavicular nodes did not demonstrate any abnormal lymph nodes or masses. No parotid masses. Palpation of the thyroid was soft and smooth, with no nodules or goiter appreciated.  The trachea was mobile and midline.  Neurologic - CN II-XII are grossly intact, no focal neurologic deficits.       Procedure: Flexible Endoscopy  Indication: Sore Throat    To best " visualize the upper airway anatomy and due to the chief complaint and HPI, I proceeded with a fiberoptic examination. Color photographs were taken for the medical record. First I sprayed the right side of the nose with a mixture of lidocaine and neosynephrine.  I then passed the scope through the nasal cavity.  The nasal cavity was unremarkable. However, the nasopharynx had a thick, yellow mucous collection. Going further down I had a clear view of the base of tongue which had normal appearing lingual tonsillar tissue.  The base of tongue was free of lesions, masses, and the vallecula was open.  The epiglottis was smooth and mucosally covered.  The supraglottic larynx was then clearly visualized. It was normal. No masses or erythema. No lesions. The vocal cords moved smoothly and symmetrically, they were pearly white and no lesions were seen.  The pyriform sinuses were open, and the limited view of the postcricoid region did not show any lesions.      A/P - Rozina Bell is a 68 year old female with a sore throat. Exam revealed thick mucopus in the nasopharynx. I think the most likely etiology is postnasal drainage from sinusitis. I recommend treatment with doxycycline. She should also use nasal saline irrigations. If this fails, we may need to better treat postnasal drainage with nasal medications.         Dr. Marcos Dias  Otolaryngology  [facility]

## 2018-09-25 NOTE — PATIENT INSTRUCTIONS
General Scheduling Information  To schedule your CT/MRI scan, please contact [place] [facility] at [phone]   [address] [place] [address]    To schedule your Surgery, please contact our Specialty Schedulers at [phone]    ENT Clinic Locations Clinic Hours Telephone Number     [facility]  [address] [place] [address]   Tuesday:       8:00am -- 4:00pm    Wednesday:  8:00am - 4:00pm   To schedule an appointment with   Dr. Dias,   please contact our   Specialty Scheduling Department at:     [phone]       [facility]  [address] [place] [address]   Friday:          8:00am - 4:00pm         Urgent Care Locations Clinic Hours Telephone Numbers     [facility]  [address] [place] [address]     Monday-Friday:     11:00pm - 9:00pm    Saturday-Sunday:  9:00am - 5:00pm   [phone]     [facility] Wharton  [address] [place] [address]     Monday-Friday:      5:00pm - 9:00pm     Saturday-Sunday:  9:00am - 5:00pm   [phone]

## 2018-09-25 NOTE — MR AVS SNAPSHOT
After Visit Summary   9/25/2018    Rozina Bell    MRN: [id number]           Patient Information     Date Of Birth          1950        Visit Information        Provider Department      9/25/2018 11:45 AM Marcos Dias MD [facility]        Today's Diagnoses     Chronic maxillary sinusitis    -  1    Throat pain          Care Instructions    General Scheduling Information  To schedule your CT/MRI scan, please contact [facility] at [phone] [address] [place] [address]    To schedule your Surgery, please contact our Specialty Schedulers at [phone]    ENT Clinic Locations Clinic Hours Telephone Number     [facility] [place]  [address]  [place] [address]   Tuesday:       8:00am -- 4:00pm    Wednesday:  8:00am - 4:00pm   To schedule an appointment with   Dr. Dias,   please contact our   Specialty Scheduling Department at:     [phone]       [facility]  [address] [place] [address]   Friday:          8:00am - 4:00pm         Urgent Care Locations Clinic Hours Telephone Numbers     [facility] [facility]  [address] [facility] [place] [address]     Monday-Friday:     11:00pm - 9:00pm    Saturday-Sunday:  9:00am - 5:00pm   [phone]     [facility]  [address] [facility] [place] [address]     Monday-Friday:      5:00pm - 9:00pm     Saturday-Sunday:  9:00am - 5:00pm   [phone]                   Follow-ups after your visit        Your next 10 appointments already scheduled     Oct 08, 2018 12:40 PM CDT   [facility] Visit [facility] Evita Tobias [facility]   [facility]    [address] [facility] [id number] [phone]            Oct 29, 2018  9:00 AM CDT   LAB with [facility]   [facility]    [address] [place] [id number] [phone]           Please do not eat 10-12 hours before your appointment if you are coming in  "fasting for labs on lipids, cholesterol, or glucose (sugar). This does not apply to pregnant women. Water, hot tea and black coffee (with nothing added) are okay. Do not drink other fluids, diet soda or chew gum.            Oct 29, 2018  9:30 AM CDT   Nurse Only with [facility] ANCILL[facility]   [facility] ([address]  [place] [address] [phone]            Nov 07, 2018  1:30 PM CST   PHYSICAL with Deisy Sharpe MD   [facility] ([address]  [place] [address] [phone]              Who to contact     If you have questions or need follow up information about today's clinic visit or your schedule please contact [facility] directly at [phone].  Normal or non-critical lab and imaging results will be communicated to you by MyChart, letter or phone within 4 business days after the clinic has received the results. If you do not hear from us within 7 days, please contact the clinic through MyChart or phone. If you have a critical or abnormal lab result, we will notify you by phone as soon as possible.  Submit refill requests through [facility] or call your pharmacy and they will forward the refill request to us. Please allow 3 business days for your refill to be completed.          Additional Information About Your Visit        Care EveryWhere ID     This is your Care EveryWhere ID. This could be used by other organizations to access your [facility] medical records  [id number]        Your Vitals Were     Pulse Height BMI (Body Mass Index)             96 1.6 m (5' 3\") 48.01 kg/m2          Blood Pressure from Last 3 Encounters:   09/25/18 184/90   07/25/18 146/84   12/21/17 143/66    Weight from Last 3 Encounters:   01/03/18 122.9 kg (271 lb)   12/27/17 122.9 kg (271 lb)   11/06/17 122.9 kg (271 lb)              We Performed the Following     Laryngoscopy, Fiber          Today's Medication Changes "          These changes are accurate as of 9/25/18  1:15 PM.  If you have any questions, ask your nurse or doctor.               Start taking these medicines.        Dose/Directions    doxycycline 100 MG capsule   Commonly known as:  VIBRAMYCIN   Used for:  Chronic maxillary sinusitis   Started by:  Marcos Dias MD        Dose:  100 mg   Take 1 capsule (100 mg) by mouth 2 times daily for 10 days   Quantity:  20 capsule   Refills:  0            Where to get your medicines      These medications were sent to [id number] - [place], [place] - [address] [place]  [address] [address] [place] [address]     Phone:  [phone]     doxycycline 100 MG capsule                Primary Care Provider Office Phone # Fax #    Deisy Sharpe [phone] [address] [place] [address]        Equal Access to Services     [facility]: Hadii aad shanell hadasho Soomaali, waaxda luqadaha, qaybta kaalmada adeegyada, nelson tavares . So [place] [phone].    ATENCIÓN: Si habla español, tiene a perkins disposición servicios gratuitos de asistencia lingüística. Llame al [phone].    We comply with applicable federal civil rights laws and [place] laws. We do not discriminate on the basis of race, color, national origin, age, disability, sex, sexual orientation, or gender identity.            Thank you!     Thank you for choosing [facility]  for your care. Our goal is always to provide you with excellent care. Hearing back from our patients is one way we can continue to improve our services. Please take a few minutes to complete the written survey that you may receive in the mail after your visit with us. Thank you!             Your Updated Medication List - Protect others around you: Learn how to safely use, store and throw away your medicines at www.disposemymeds.org.          This list is accurate as of 9/25/18  1:15 PM.  Always use your most recent  med list.                   Brand Name Dispense Instructions for use Diagnosis    calcium + D 600-200 MG-UNIT Tabs   Generic drug:  calcium carbonate-vitamin D      1 TABLET TWICE  DAILY        doxycycline 100 MG capsule    VIBRAMYCIN    20 capsule    Take 1 capsule (100 mg) by mouth 2 times daily for 10 days    Chronic maxillary sinusitis       GLUCOSAMINE CHONDROITIN Tabs      1 TABLET DAILY        hydrOXYzine 25 MG tablet    ATARAX    60 tablet    Take 1 tablet (25 mg) by mouth 2 times daily as needed for itching    Acute seasonal allergic rhinitis due to pollen       magnesium oxide 400 MG Caps     270 capsule    Take 1 tablet by mouth 3 times daily    Hypomagnesemia       meloxicam 15 MG tablet    MOBIC    90 tablet    TAKE 1 TABLET BY MOUTH EVERY DAY.    Spinal stenosis, unspecified spinal region       MULTI-VITAMIN PO      None Entered        order for DME     1 Device    Equipment being ordered: compression stocking joey    Edema       order for DME     1 Device    Equipment being ordered: [id number] Thumb Splint, CMC RT, MD  [id number] Thumb splint, CMC, LT MD    Primary osteoarthritis of first carpometacarpal joint of right hand, Primary osteoarthritis of first carpometacarpal joint of left hand       * SYNTHROID 50 MCG tablet   Generic drug:  levothyroxine     90 tablet    Take 1 tablet (50 mcg) by mouth daily    Acquired hypothyroidism       * levothyroxine 50 MCG tablet    SYNTHROID/LEVOTHROID    90 tablet    Take 1 tablet (50 mcg) by mouth daily Patient would like to see the price difference between synthroid and levothyroxine    Hypothyroidism, unspecified type       VITAMIN C PO      Take 500 mg by mouth daily        vitamin D 1000 units capsule     180 capsule    Take 2,000 Units by mouth daily    Hypovitaminosis D       VITAMIN E PO      1 TABLET DAILY        * Notice:  This list has 2 medication(s) that are the same as other medications prescribed for you. Read the directions carefully, and ask  your doctor or other care provider to review them with you.

## 2018-09-25 NOTE — TELEPHONE ENCOUNTER
Spoke with pharmacist. Pt Is allergic to yellow dyes and yellow dyes is in every kind of doxy.  Is in need of a substitute.   Should a z benny be prescribed? Please advise on alternate.   Spoke with pt and she states she can't do a z benny because it doesn't help and causes more problems. She said she used to have a list of meds that worked when she filled at [facility], now fills at [facility] and they don't have this history. She was seen outside of  at [facility] years ago and was prescribed an abx. Reviewed record and she was prescribed doxy, same dose. She thinks that they went with a higher dose because it did not have the dye in it. No notes regarding this. She is not sure what she should try and is concerned because her  is leaving for work and doesn't want to be home alone and have a reaction to an abx while he is gone.   In regards to the idocaine, face is flushed from it. That's all it did this time.   Please advise.    Kayleigh Howard RN  [facility]

## 2018-09-25 NOTE — LETTER
9/25/2018         RE: Rozina Bell  [address] [address]  [place] [address]        Dear Colleague,    Thank you for referring your patient, Rozina Bell, to the [facility]. Please see a copy of my visit note below.    Chief Complaint - sore throat    History of Present Illness - Rozina Bell is a 68 year old female who presents with a history of sore throat. Feels it is in a general area, not a specific spot. This has been going on for months or more. Worse at night and in the morning. Has some yellow/green drainage she coughs up in the morning. She can't blow it out. Some epistaxis right side. Some face pain/headaches. No snoring or mouth breathing. Throat clears, but no cough. Voicing has seemed a little hoarse. They note no significant history of relux. They have tried nothing. No dysphagia. Nonsmoker.    Past Medical History -   Patient Active Problem List   Diagnosis     Hypothyroidism     Allergic state     Spinal stenosis     CARDIOVASCULAR SCREENING; LDL GOAL LESS THAN 160     Fibromyalgia     Obesity     AK (actinic keratosis)     Hypertension goal BP (blood pressure) < 140/90     Skin cancer     Advanced directives, counseling/discussion     Degenerative spondylolisthesis     Metatarsal fracture     Prepatellar bursitis     MMT (medial meniscus tear)     Lumbar spinal stenosis     Hypovitaminosis D     Primary osteoarthritis of both first carpometacarpal joints     Morbid obesity (H)     Hip pain, left       Current Medications -   Current Outpatient Prescriptions:      Ascorbic Acid (VITAMIN C PO), Take 500 mg by mouth daily, Disp: , Rfl:      CALCIUM + D 600-200 MG-UNIT OR TABS, 1 TABLET TWICE  DAILY, Disp: , Rfl:      Cholecalciferol (VITAMIN D) 1000 UNITS capsule, Take 2,000 Units by mouth daily, Disp: 180 capsule, Rfl: 4     GLUCOSAMINE CHONDROITIN OR TABS, 1 TABLET DAILY, Disp: , Rfl:      hydrOXYzine (ATARAX) 25 MG tablet, Take 1 tablet (25 mg) by mouth 2 times daily as  needed for itching, Disp: 60 tablet, Rfl: 3     levothyroxine (SYNTHROID/LEVOTHROID) 50 MCG tablet, Take 1 tablet (50 mcg) by mouth daily Patient would like to see the price difference between synthroid and levothyroxine, Disp: 90 tablet, Rfl: 3     magnesium oxide 400 MG CAPS, Take 1 tablet by mouth 3 times daily, Disp: 270 capsule, Rfl: 4     meloxicam (MOBIC) 15 MG tablet, TAKE 1 TABLET BY MOUTH EVERY DAY., Disp: 90 tablet, Rfl: 3     MULTI-VITAMIN OR, None Entered, Disp: , Rfl:      order for DME, Equipment being ordered: [id number] Thumb Splint, CMC RT, MD  [id number] Thumb splint, CMC, LT MD, Disp: 1 Device, Rfl: 0     ORDER FOR DME, Equipment being ordered: compression stocking joey, Disp: 1 Device, Rfl: 0     SYNTHROID 50 MCG tablet, Take 1 tablet (50 mcg) by mouth daily, Disp: 90 tablet, Rfl: 3     VITAMIN E OR, 1 TABLET DAILY, Disp: , Rfl:     Allergies -   Allergies   Allergen Reactions     Amitriptyline      jittery     Celebrex [Celecoxib]      Epinephrine-Lidocaine-Na Metabisulfite Other (See Comments)     Palpitations, flushing     Latex      Morphine      Penicillins      Sulfa Drugs      Ultram [Tramadol Hcl]      Yellow Dye      Zithromax [Azithromycin]        Social History -   Social History     Social History     Marital status:      Spouse name: N/A     Number of children: N/A     Years of education: N/A     Social History Main Topics     Smoking status: Never Smoker     Smokeless tobacco: Never Used      Comment: Lives in smoke free household     Alcohol use No     Drug use: No     Sexual activity: Not Currently     Partners: Male     Other Topics Concern     None     Social History Narrative       Family History -   Family History   Problem Relation Age of Onset     HEART DISEASE Mother      Arthritis Mother      Cardiovascular Mother      Thyroid Disease Mother      Hypertension Mother      Cerebrovascular Disease Mother      GASTROINTESTINAL DISEASE Father      Cancer Father       "Respiratory Father      HEART DISEASE Maternal Grandfather      Diabetes Paternal Grandfather      Cancer Brother      Diabetes Brother      HEART DISEASE Sister      Neurologic Disorder Sister      EPILEPSY     Allergies Son      HEART DISEASE Brother      TRIPLE BYPASS     Lipids Brother      Hypertension Brother      Chronic Obstructive Pulmonary Disease Brother      Glaucoma No family hx of      Macular Degeneration No family hx of      Review of Systems - As per HPI and PMHx, some face pain, otherwise 7 system review of the head and neck is negative.    Physical Exam  /90  Pulse 96  Ht 1.6 m (5' 3\")  BMI 48.01 kg/m2  General - The patient is in no distress. Alert and oriented to person and place, answers questions and cooperates with examination appropriately.   Voice and Breathing - The patient was breathing comfortably without the use of accessory muscles. There was no wheezing, stridor, or stertor.  The patients voice was clear and strong.  Eyes - Extraocular movements intact.  Sclera were not icteric or injected, conjunctiva were pink and moist.  Mouth - Examination of the oral cavity showed pink, healthy oral mucosa. No lesions or ulcerations noted.  The tongue was mobile and midline.  Throat - The walls of the oropharynx were smooth, symmetric, and had no lesions or ulcerations.  The tonsillar pillars and soft palate were symmetric.  The uvula was midline on elevation. Tonsils 1+, no erythema.   Nose - External contour is symmetric, no gross deflection or scars.  Nasal mucosa is pink and moist with no abnormal mucus.  The septum was midline and non-obstructive, turbinates of normal size and position.  No polyps, masses, or purulence noted on examination.  Neck -  Some tenderness in level 1&2. Palpation of the occipital, submental, submandibular, internal jugular chain, and supraclavicular nodes did not demonstrate any abnormal lymph nodes or masses. No parotid masses. Palpation of the thyroid was " soft and smooth, with no nodules or goiter appreciated.  The trachea was mobile and midline.  Neurologic - CN II-XII are grossly intact, no focal neurologic deficits.       Procedure: Flexible Endoscopy  Indication: Sore Throat    To best visualize the upper airway anatomy and due to the chief complaint and HPI, I proceeded with a fiberoptic examination. Color photographs were taken for the medical record. First I sprayed the right side of the nose with a mixture of lidocaine and neosynephrine.  I then passed the scope through the nasal cavity.  The nasal cavity was unremarkable. However, the nasopharynx had a thick, yellow mucous collection. Going further down I had a clear view of the base of tongue which had normal appearing lingual tonsillar tissue.  The base of tongue was free of lesions, masses, and the vallecula was open.  The epiglottis was smooth and mucosally covered.  The supraglottic larynx was then clearly visualized. It was normal. No masses or erythema. No lesions. The vocal cords moved smoothly and symmetrically, they were pearly white and no lesions were seen.  The pyriform sinuses were open, and the limited view of the postcricoid region did not show any lesions.      A/P - Rozina Bell is a 68 year old female with a sore throat. Exam revealed thick mucopus in the nasopharynx. I think the most likely etiology is postnasal drainage from sinusitis. I recommend treatment with doxycycline. She should also use nasal saline irrigations. If this fails, we may need to better treat postnasal drainage with nasal medications.         Dr. Marcos Dias  Otolaryngology  [facility]        Again, thank you for allowing me to participate in the care of your patient.        Sincerely,        Marcos Dias MD

## 2018-09-26 NOTE — TELEPHONE ENCOUNTER
Spoke with pharmacist who found a Doxycycline hyclate that is free of yellow dye. It is dispensed in 20 mg tablets and will be prescribed as 5 tablets BID to match original prescription. They will contact the patient.     Santos Miller RN....9/26/2018 10:35 AM

## 2018-10-08 ENCOUNTER — OFFICE VISIT (OUTPATIENT)
Dept: OPTOMETRY | Facility: CLINIC | Age: 68
End: 2018-10-08
Payer: COMMERCIAL

## 2018-10-08 ENCOUNTER — ALLIED HEALTH/NURSE VISIT (OUTPATIENT)
Dept: NURSING | Facility: CLINIC | Age: 68
End: 2018-10-08
Payer: COMMERCIAL

## 2018-10-08 DIAGNOSIS — H52.223 REGULAR ASTIGMATISM OF BOTH EYES: Primary | ICD-10-CM

## 2018-10-08 DIAGNOSIS — Z23 NEED FOR PROPHYLACTIC VACCINATION AND INOCULATION AGAINST INFLUENZA: Primary | ICD-10-CM

## 2018-10-08 DIAGNOSIS — H52.4 PRESBYOPIA: ICD-10-CM

## 2018-10-08 PROCEDURE — G0008 ADMIN INFLUENZA VIRUS VAC: HCPCS

## 2018-10-08 PROCEDURE — 90662 IIV NO PRSV INCREASED AG IM: CPT

## 2018-10-08 PROCEDURE — 92014 COMPRE OPH EXAM EST PT 1/>: CPT | Performed by: OPTOMETRIST

## 2018-10-08 PROCEDURE — 92015 DETERMINE REFRACTIVE STATE: CPT | Performed by: OPTOMETRIST

## 2018-10-08 PROCEDURE — 99207 ZZC NO CHARGE NURSE ONLY: CPT

## 2018-10-08 ASSESSMENT — TONOMETRY
OS_IOP_MMHG: 12
OD_IOP_MMHG: 12
IOP_METHOD: APPLANATION

## 2018-10-08 ASSESSMENT — REFRACTION_WEARINGRX
OS_CYLINDER: SPHERE
OD_AXIS: 060
OD_CYLINDER: +0.50
SPECS_TYPE: TRIFOCAL
OS_ADD: +2.00
OD_ADD: +2.00
OD_SPHERE: -1.00
OS_SPHERE: -0.50

## 2018-10-08 ASSESSMENT — VISUAL ACUITY
OS_CC: 20/20
METHOD: SNELLEN - LINEAR
OD_CC: 20/20
OD_CC: 20/20
CORRECTION_TYPE: GLASSES
OS_CC: 20/20
OS_CC+: -1

## 2018-10-08 ASSESSMENT — REFRACTION_MANIFEST
OS_ADD: +2.00
OD_SPHERE: -0.50
OS_CYLINDER: +0.25
OD_AXIS: 083
OD_ADD: +2.00
OD_SPHERE: -0.50
OS_AXIS: 010
OS_SPHERE: -0.25
OD_CYLINDER: +0.50
METHOD_AUTOREFRACTION: 1
OS_AXIS: 179
OS_SPHERE: -0.25
OD_CYLINDER: +0.50
OS_CYLINDER: +0.50
OD_AXIS: 055

## 2018-10-08 ASSESSMENT — CUP TO DISC RATIO
OS_RATIO: 0.2
OD_RATIO: 0.2

## 2018-10-08 ASSESSMENT — KERATOMETRY
OS_K2POWER_DIOPTERS: 44.75
OD_K1POWER_DIOPTERS: 43.75
OS_K1POWER_DIOPTERS: 43.75
OD_K2POWER_DIOPTERS: 45.00
OD_AXISANGLE2_DEGREES: 166
OS_AXISANGLE2_DEGREES: 173

## 2018-10-08 ASSESSMENT — CONF VISUAL FIELD
OS_NORMAL: 1
OD_NORMAL: 1
METHOD: COUNTING FINGERS

## 2018-10-08 NOTE — PROGRESS NOTES

## 2018-10-08 NOTE — PROGRESS NOTES
Chief Complaint   Patient presents with     COMPREHENSIVE EYE EXAM     yearly         Last Eye Exam: 6/19/2017  Dilated Previously: Yes    What are you currently using to see?  Glasses, wears glasses on and off, as needed        Distance Vision Acuity: Noticed gradual change in both eyes, not able to see as well even with the glasses     Near Vision Acuity: Satisfied with vision while reading and using computer with glasses    Eye Comfort: dry  Do you use eye drops? : Yes: When she thinks to do so, non preserved artificial tear Refresh Optive   Occupation or Hobbies: Retired     [employer] Assistant           Medical, surgical and family histories reviewed and updated 10/8/2018.   Recent increase in stress - brother passed away in Nov 2017 from acute leukemia, hemophagocytic syndrome - aggressive natural  killer cell leukemia  Mother had aneurism          OBJECTIVE: See Ophthalmology exam    ASSESSMENT:    ICD-10-CM    1. Regular astigmatism of both eyes H52.223 EYE EXAM (SIMPLE-NONBILLABLE)     REFRACTION   2. Presbyopia H52.4 EYE EXAM (SIMPLE-NONBILLABLE)     REFRACTION      PLAN:     Patient Instructions   Patient was advised of today's exam findings.  Optional to fill new glasses prescription, minimal change  Return in 1 year for eye exam    Evita Tobias O.D.  [facility]   [address] [place], [place] [id number] [phone]

## 2018-10-08 NOTE — MR AVS SNAPSHOT
After Visit Summary   10/8/2018    Rozina Bell    MRN: [id number]           Patient Information     Date Of Birth          1950        Visit Information        Provider [facility]      [facility]        Today's Diagnoses     Need for prophylactic vaccination and inoculation against influenza    -  1       Follow-ups after your visit        Your next 10 appointments already scheduled     Oct 29, 2018  9:00 AM CDT   LAB with [facility]   [facility]    [address] [facility] [address]   [phone]           Please do not eat 10-12 hours before your appointment if you are coming in fasting for labs on lipids, cholesterol, or glucose (sugar). This does not apply to pregnant women. Water, hot tea and black coffee (with nothing added) are okay. Do not drink other fluids, diet soda or chew gum.            Nov 07, 2018  1:30 PM CST   PHYSICAL with Deisy Sharpe MD   [facility] ([facility]    [address] [facility] [address]   [phone]              Who to contact     If you have questions or need follow up information about today's clinic visit or your schedule please contact [facility] directly at [phone].  Normal or non-critical lab and imaging results will be communicated to you by MyChart, letter or phone within 4 business days after the clinic has received the results. If you do not hear from us within 7 days, please contact the clinic through MyChart or phone. If you have a critical or abnormal lab result, we will notify you by phone as soon as possible.  Submit refill requests through [employer] or call your pharmacy and they will forward the refill request to us. Please allow 3 business days for your refill to be completed.          Additional Information About Your Visit        Care EveryWhere ID     This is your Care EveryWhere ID.  This could be used by other organizations to access your [place] medical records  [id number]         Blood Pressure from Last 3 Encounters:   09/25/18 184/90   07/25/18 146/84   12/21/17 143/66    Weight from Last 3 Encounters:   01/03/18 271 lb (122.9 kg)   12/27/17 271 lb (122.9 kg)   11/06/17 271 lb (122.9 kg)              We Performed the Following     ADMIN INFLUENZA (For MEDICARE Patients ONLY) []     FLU VACCINE, INCREASED ANTIGEN, PRESV FREE, AGE 65+ [66661]        Primary Care Provider Office Phone # Fax #    Deisy April Sharpe [phone] [address] [facility] [address] [facility] [address]        Equal Access to Services     EDUARD CAMPBELL : Hadii sayra reece hadasho Soomaali, waaxda luqadaha, qaybta kaalmada adeegyada, nelson tavares . So [facility] [phone].    ATENCIÓN: Si habla español, tiene a perkins disposición servicios gratuitos de asistencia lingüística. Llame al [phone].    We comply with applicable federal civil rights laws and [place] laws. We do not discriminate on the basis of race, color, national origin, age, disability, sex, sexual orientation, or gender identity.            Thank you!     Thank you for [facility] your [facility]. Our goal is always to provide you with excellent care. Hearing back from our patients is one way we can continue to improve our services. Please take a few minutes to complete the written survey that you may receive in the mail after your visit with us. Thank you!             Your Updated Medication List - Protect others around you: Learn how to safely use, store and throw away your medicines at www.disposemymeds.org.          This list is accurate as of 10/8/18  3:52 PM.  Always use your most recent med list.                   Brand Name Dispense Instructions for use Diagnosis    calcium + D 600-200 MG-UNIT Tabs   Generic drug:  calcium carbonate-vitamin D      1 TABLET TWICE  DAILY        GLUCOSAMINE  CHONDROITIN Tabs      1 TABLET DAILY        hydrOXYzine 25 MG tablet    ATARAX    60 tablet    Take 1 tablet (25 mg) by mouth 2 times daily as needed for itching    Acute seasonal allergic rhinitis due to pollen       magnesium oxide 400 MG Caps     270 capsule    Take 1 tablet by mouth 3 times daily    Hypomagnesemia       meloxicam 15 MG tablet    MOBIC    90 tablet    TAKE 1 TABLET BY MOUTH EVERY DAY.    Spinal stenosis, unspecified spinal region       MULTI-VITAMIN PO      None Entered        order for DME     1 Device    Equipment being ordered: compression stocking joey    Edema       order for DME     1 Device    Equipment being ordered: [id number] Thumb Splint, CMC RT, MD  [id number] Thumb splint, CMC, LT MD    Primary osteoarthritis of first carpometacarpal joint of right hand, Primary osteoarthritis of first carpometacarpal joint of left hand       * SYNTHROID 50 MCG tablet   Generic drug:  levothyroxine     90 tablet    Take 1 tablet (50 mcg) by mouth daily    Acquired hypothyroidism       * levothyroxine 50 MCG tablet    SYNTHROID/LEVOTHROID    90 tablet    Take 1 tablet (50 mcg) by mouth daily Patient would like to see the price difference between synthroid and levothyroxine    Hypothyroidism, unspecified type       VITAMIN C PO      Take 500 mg by mouth daily        vitamin D 1000 units capsule     180 capsule    Take 2,000 Units by mouth daily    Hypovitaminosis D       VITAMIN E PO      1 TABLET DAILY        * Notice:  This list has 2 medication(s) that are the same as other medications prescribed for you. Read the directions carefully, and ask your doctor or other care provider to review them with you.

## 2018-10-08 NOTE — MR AVS SNAPSHOT
After Visit Summary   10/8/2018    Rozina Bell    MRN: [id number]           Patient Information     Date Of Birth          1950        Visit Information        Provider Department      10/8/2018 12:40 PM Evita Tobias OD [facility]        Today's Diagnoses     Regular astigmatism of both eyes    -  1    Presbyopia          Care Instructions    Patient was advised of today's exam findings.  Optional to fill new glasses prescription, minimal change  Return in 1 year for eye exam    Evita Tobias O.D.  [facility]   [address] [place] [address]  [phone]            Follow-ups after your visit        Follow-up notes from your care team     Return in about 1 year (around 10/8/2019) for Eye Exam.      Your next 10 appointments already scheduled     Nov 07, 2018  1:30 PM CST   PHYSICAL with Deisy Sharpe MD   [facility] ([address] [address] [phone]              Who to contact     If you have questions or need follow up information about today's clinic visit or your schedule please contact [facility] directly at [phone].  Normal or non-critical lab and imaging results will be communicated to you by MyChart, letter or phone within 4 business days after the clinic has received the results. If you do not hear from us within 7 days, please contact the clinic through MyChart or phone. If you have a critical or abnormal lab result, we will notify you by phone as soon as possible.  Submit refill requests through [employer] or call your pharmacy and they will forward the refill request to us. Please allow 3 business days for your refill to be completed.          Additional Information About Your Visit        Care EveryWhere ID     This is your Care EveryWhere ID. This could be used by other organizations to access your [place] medical records  [id number]         Blood  Pressure from Last 3 Encounters:   09/25/18 184/90   07/25/18 146/84   12/21/17 143/66    Weight from Last 3 Encounters:   01/03/18 122.9 kg (271 lb)   12/27/17 122.9 kg (271 lb)   11/06/17 122.9 kg (271 lb)              We Performed the Following     EYE EXAM (SIMPLE-NONBILLABLE)     REFRACTION        Primary Care Provider Office Phone # Fax #    Deisy Sharpe [phone] [address] [facility] [address]        Equal Access to Services     [facility]: Hadii aad ku hadasho Soomaali, waaxda luqadaha, qaybta kaalmada adekelliyadonnell, nelson tavares . So [facility] [phone].    ATENCIÓN: Si habla español, tiene a perkins disposición servicios gratuitos de asistencia lingüística. [facility] [phone].    We comply with applicable federal civil rights laws and [place] laws. We do not discriminate on the basis of race, color, national origin, age, disability, sex, sexual orientation, or gender identity.            Thank you!     Thank you for choosing [facility]  [facility] your [facility]. Our goal is always to provide you with excellent care. Hearing back from our patients is one way we can continue to improve our services. Please take a few minutes to complete the written survey that you may receive in the mail after your visit with us. Thank you!             Your Updated Medication List - Protect others around you: Learn how to safely use, store and throw away your medicines at www.disposemymeds.org.          This list is accurate as of 10/8/18 11:59 PM.  Always use your most recent med list.                   Brand Name Dispense Instructions for use Diagnosis    calcium + D 600-200 MG-UNIT Tabs   Generic drug:  calcium carbonate-vitamin D      1 TABLET TWICE  DAILY        GLUCOSAMINE CHONDROITIN Tabs      1 TABLET DAILY        hydrOXYzine 25 MG tablet    ATARAX    60 tablet    Take 1 tablet (25 mg) by mouth 2 times daily as needed for itching    Acute seasonal  allergic rhinitis due to pollen       magnesium oxide 400 MG Caps     270 capsule    Take 1 tablet by mouth 3 times daily    Hypomagnesemia       meloxicam 15 MG tablet    MOBIC    90 tablet    TAKE 1 TABLET BY MOUTH EVERY DAY.    Spinal stenosis, unspecified spinal region       MULTI-VITAMIN PO      None Entered        order for DME     1 Device    Equipment being ordered: compression stocking joey    Edema       order for DME     1 Device    Equipment being ordered: [id number] Thumb Splint, CMC RT, MD  [id number] Thumb splint, CMC, LT MD    Primary osteoarthritis of first carpometacarpal joint of right hand, Primary osteoarthritis of first carpometacarpal joint of left hand       * SYNTHROID 50 MCG tablet   Generic drug:  levothyroxine     90 tablet    Take 1 tablet (50 mcg) by mouth daily    Acquired hypothyroidism       * levothyroxine 50 MCG tablet    SYNTHROID/LEVOTHROID    90 tablet    Take 1 tablet (50 mcg) by mouth daily Patient would like to see the price difference between synthroid and levothyroxine    Hypothyroidism, unspecified type       VITAMIN C PO      Take 500 mg by mouth daily        vitamin D 1000 units capsule     180 capsule    Take 2,000 Units by mouth daily    Hypovitaminosis D       VITAMIN E PO      1 TABLET DAILY        * Notice:  This list has 2 medication(s) that are the same as other medications prescribed for you. Read the directions carefully, and ask your doctor or other care provider to review them with you.

## 2018-10-21 ASSESSMENT — EXTERNAL EXAM - LEFT EYE: OS_EXAM: NORMAL

## 2018-10-21 ASSESSMENT — EXTERNAL EXAM - RIGHT EYE: OD_EXAM: NORMAL

## 2018-10-21 NOTE — PATIENT INSTRUCTIONS
Patient was advised of today's exam findings.  Optional to fill new glasses prescription, minimal change  Return in 1 year for eye exam    Evita Tobias O.D.  [facility]   [address] [place] [address] [phone]

## 2018-10-29 DIAGNOSIS — Z13.6 CARDIOVASCULAR SCREENING; LDL GOAL LESS THAN 160: ICD-10-CM

## 2018-10-29 DIAGNOSIS — Z13.1 SCREENING FOR DIABETES MELLITUS: ICD-10-CM

## 2018-10-29 DIAGNOSIS — Z79.899 HIGH RISK MEDICATION USE: ICD-10-CM

## 2018-10-29 DIAGNOSIS — I10 HYPERTENSION GOAL BP (BLOOD PRESSURE) < 140/90: ICD-10-CM

## 2018-10-29 DIAGNOSIS — E55.9 HYPOVITAMINOSIS D: ICD-10-CM

## 2018-10-29 DIAGNOSIS — E03.9 HYPOTHYROIDISM, UNSPECIFIED TYPE: ICD-10-CM

## 2018-10-29 LAB
ANION GAP SERPL CALCULATED.3IONS-SCNC: 5 MMOL/L (ref 3–14)
BUN SERPL-MCNC: 15 MG/DL (ref 7–30)
CALCIUM SERPL-MCNC: 8.9 MG/DL (ref 8.5–10.1)
CHLORIDE SERPL-SCNC: 107 MMOL/L (ref 94–109)
CHOLEST SERPL-MCNC: 205 MG/DL
CO2 SERPL-SCNC: 29 MMOL/L (ref 20–32)
CREAT SERPL-MCNC: 0.73 MG/DL (ref 0.52–1.04)
GFR SERPL CREATININE-BSD FRML MDRD: 80 ML/MIN/1.7M2
GLUCOSE SERPL-MCNC: 87 MG/DL (ref 70–99)
HDLC SERPL-MCNC: 68 MG/DL
LDLC SERPL CALC-MCNC: 127 MG/DL
NONHDLC SERPL-MCNC: 137 MG/DL
POTASSIUM SERPL-SCNC: 4.3 MMOL/L (ref 3.4–5.3)
SODIUM SERPL-SCNC: 141 MMOL/L (ref 133–144)
TRIGL SERPL-MCNC: 50 MG/DL
TSH SERPL DL<=0.005 MIU/L-ACNC: 2.6 MU/L (ref 0.4–4)

## 2018-10-29 PROCEDURE — 82306 VITAMIN D 25 HYDROXY: CPT | Performed by: INTERNAL MEDICINE

## 2018-10-29 PROCEDURE — 36415 COLL VENOUS BLD VENIPUNCTURE: CPT | Performed by: INTERNAL MEDICINE

## 2018-10-29 PROCEDURE — 84443 ASSAY THYROID STIM HORMONE: CPT | Performed by: INTERNAL MEDICINE

## 2018-10-29 PROCEDURE — 80061 LIPID PANEL: CPT | Performed by: INTERNAL MEDICINE

## 2018-10-29 PROCEDURE — 80048 BASIC METABOLIC PNL TOTAL CA: CPT | Performed by: INTERNAL MEDICINE

## 2018-10-29 NOTE — LETTER
[facility] [address] [place] [address]    October 30, 2018    Rozina Bell   [address] [address]  [place] [address]      Dear Rozina    Previsit lab to be discussed in clinic.    Enclosed is a copy of your results.   Results for orders placed or performed in visit on 10/29/18   **TSH with free T4 reflex FUTURE anytime   Result Value Ref Range    TSH 2.60 0.40 - 4.00 mU/L   Lipid panel reflex to direct LDL Fasting   Result Value Ref Range    Cholesterol 205 (H) <200 mg/dL    Triglycerides 50 <150 mg/dL    HDL Cholesterol 68 >49 mg/dL    LDL Cholesterol Calculated 127 (H) <100 mg/dL    Non HDL Cholesterol 137 (H) <130 mg/dL   **Basic metabolic panel FUTURE anytime   Result Value Ref Range    Sodium 141 133 - 144 mmol/L    Potassium 4.3 3.4 - 5.3 mmol/L    Chloride 107 94 - 109 mmol/L    Carbon Dioxide 29 20 - 32 mmol/L    Anion Gap 5 3 - 14 mmol/L    Glucose 87 70 - 99 mg/dL    Urea Nitrogen 15 7 - 30 mg/dL    Creatinine 0.73 0.52 - 1.04 mg/dL    GFR Estimate 80 >60 mL/min/1.7m2    GFR Estimate If Black >90 >60 mL/min/1.7m2    Calcium 8.9 8.5 - 10.1 mg/dL       If you have any questions or concerns, please call myself or my nurse at [phone].    Sincerely,    Deisy Sharpe MD/natalie

## 2018-10-30 LAB — DEPRECATED CALCIDIOL+CALCIFEROL SERPL-MC: 35 UG/L (ref 20–75)

## 2018-10-30 NOTE — PATIENT INSTRUCTIONS
Preventive Health Recommendations  Female Ages 65 +    Yearly exam:     See your health care provider every year in order to  o Review health changes.   o Discuss preventive care.    o Review your medicines if your doctor has prescribed any.      You no longer need a yearly Pap test unless you've had an abnormal Pap test in the past 10 years. If you have vaginal symptoms, such as bleeding or discharge, be sure to talk with your provider about a Pap test.      Every 1 to 2 years, have a mammogram.  If you are over 69, talk with your health care provider about whether or not you want to continue having screening mammograms.      Every 10 years, have a colonoscopy. Or, have a yearly FIT test (stool test). These exams will check for colon cancer.       Have a cholesterol test every 5 years, or more often if your doctor advises it.       Have a diabetes test (fasting glucose) every three years. If you are at risk for diabetes, you should have this test more often.       At age 65, have a bone density scan (DEXA) to check for osteoporosis (brittle bone disease).    Shots:    Get a flu shot each year.    Get a tetanus shot every 10 years.    Talk to your doctor about your pneumonia vaccines. There are now two you should receive - Pneumovax (PPSV 23) and Prevnar (PCV 13).    Talk to your pharmacist about the shingles vaccine.    Talk to your doctor about the hepatitis B vaccine.    Nutrition:     Eat at least 5 servings of fruits and vegetables each day.      Eat whole-grain bread, whole-wheat pasta and brown rice instead of white grains and rice.      Get adequate about Calcium and Vitamin D.     Lifestyle    Exercise at least 150 minutes a week (30 minutes a day, 5 days a week). This will help you control your weight and prevent disease.      Limit alcohol to one drink per day.      No smoking.       Wear sunscreen to prevent skin cancer.       See your dentist twice a year for an exam and cleaning.      See your eye  doctor every 1 to 2 years to screen for conditions such as glaucoma, macular degeneration, cataracts, etc     [facility]    If you have any questions regarding to your visit please contact your care team:     Team Pink:   Clinic Hours Telephone Number   Internal Medicine:  Dr. Deisy Porter NP 7am-7pm  Monday - Thursday   7am-5pm  Fridays  [phone]  (Appointment scheduling available 24/7)   Urgent Care - [facility] - 11am-9pm Monday-Friday Saturday-Sunday- 9am-5pm   [place] - 5pm-9pm Monday-Friday Saturday-Sunday- 9am-5pm  [phone] - [facility]  [phone] - [place]       What options do I have for a visit other than an office visit? We offer electronic visits (e-visits) and telephone visits, when medically appropriate.  Please check with your medical insurance to see if these types of visits are covered, as you will be responsible for any charges that are not paid by your insurance.      You can use [employer] (secure electronic communication) to access to your chart, send your primary care provider a message, or make an appointment. Ask a team member how to get started.     For a price quote for your services, please call our Consumer Price Line at [phone] or our Imaging Cost estimation line at [phone] (for imaging tests).  Discharged By: An

## 2018-11-07 ENCOUNTER — OFFICE VISIT (OUTPATIENT)
Dept: INTERNAL MEDICINE | Facility: CLINIC | Age: 68
End: 2018-11-07
Payer: COMMERCIAL

## 2018-11-07 VITALS
SYSTOLIC BLOOD PRESSURE: 128 MMHG | OXYGEN SATURATION: 97 % | HEART RATE: 82 BPM | DIASTOLIC BLOOD PRESSURE: 78 MMHG | RESPIRATION RATE: 16 BRPM | BODY MASS INDEX: 50 KG/M2 | WEIGHT: 282.2 LBS | HEIGHT: 63 IN | TEMPERATURE: 97.4 F

## 2018-11-07 DIAGNOSIS — E55.9 HYPOVITAMINOSIS D: ICD-10-CM

## 2018-11-07 DIAGNOSIS — I10 HYPERTENSION GOAL BP (BLOOD PRESSURE) < 140/90: ICD-10-CM

## 2018-11-07 DIAGNOSIS — M18.0 PRIMARY OSTEOARTHRITIS OF BOTH FIRST CARPOMETACARPAL JOINTS: ICD-10-CM

## 2018-11-07 DIAGNOSIS — E03.9 HYPOTHYROIDISM, UNSPECIFIED TYPE: ICD-10-CM

## 2018-11-07 DIAGNOSIS — F43.21 GRIEF: ICD-10-CM

## 2018-11-07 DIAGNOSIS — E83.42 HYPOMAGNESEMIA: ICD-10-CM

## 2018-11-07 DIAGNOSIS — M19.071 PRIMARY OSTEOARTHRITIS OF BOTH ANKLES: ICD-10-CM

## 2018-11-07 DIAGNOSIS — J30.1 ACUTE SEASONAL ALLERGIC RHINITIS DUE TO POLLEN: ICD-10-CM

## 2018-11-07 DIAGNOSIS — Z00.00 ROUTINE GENERAL MEDICAL EXAMINATION AT A HEALTH CARE FACILITY: Primary | ICD-10-CM

## 2018-11-07 DIAGNOSIS — M48.061 SPINAL STENOSIS OF LUMBAR REGION, UNSPECIFIED WHETHER NEUROGENIC CLAUDICATION PRESENT: ICD-10-CM

## 2018-11-07 DIAGNOSIS — Z80.6 FAMILY HISTORY OF LEUKEMIA: ICD-10-CM

## 2018-11-07 DIAGNOSIS — M19.072 PRIMARY OSTEOARTHRITIS OF BOTH ANKLES: ICD-10-CM

## 2018-11-07 LAB
BASOPHILS # BLD AUTO: 0.1 10E9/L (ref 0–0.2)
BASOPHILS NFR BLD AUTO: 1 %
DIFFERENTIAL METHOD BLD: NORMAL
EOSINOPHIL # BLD AUTO: 0.2 10E9/L (ref 0–0.7)
EOSINOPHIL NFR BLD AUTO: 4.1 %
ERYTHROCYTE [DISTWIDTH] IN BLOOD BY AUTOMATED COUNT: 13.2 % (ref 10–15)
HCT VFR BLD AUTO: 40.1 % (ref 35–47)
HGB BLD-MCNC: 13.5 G/DL (ref 11.7–15.7)
LYMPHOCYTES # BLD AUTO: 1.3 10E9/L (ref 0.8–5.3)
LYMPHOCYTES NFR BLD AUTO: 25.2 %
MAGNESIUM SERPL-MCNC: 2.1 MG/DL (ref 1.6–2.3)
MCH RBC QN AUTO: 31.3 PG (ref 26.5–33)
MCHC RBC AUTO-ENTMCNC: 33.7 G/DL (ref 31.5–36.5)
MCV RBC AUTO: 93 FL (ref 78–100)
MONOCYTES # BLD AUTO: 0.5 10E9/L (ref 0–1.3)
MONOCYTES NFR BLD AUTO: 9.8 %
NEUTROPHILS # BLD AUTO: 3.1 10E9/L (ref 1.6–8.3)
NEUTROPHILS NFR BLD AUTO: 59.9 %
PLATELET # BLD AUTO: 240 10E9/L (ref 150–450)
RBC # BLD AUTO: 4.31 10E12/L (ref 3.8–5.2)
WBC # BLD AUTO: 5.1 10E9/L (ref 4–11)

## 2018-11-07 PROCEDURE — 85025 COMPLETE CBC W/AUTO DIFF WBC: CPT | Performed by: INTERNAL MEDICINE

## 2018-11-07 PROCEDURE — 36415 COLL VENOUS BLD VENIPUNCTURE: CPT | Performed by: INTERNAL MEDICINE

## 2018-11-07 PROCEDURE — 83735 ASSAY OF MAGNESIUM: CPT | Performed by: INTERNAL MEDICINE

## 2018-11-07 PROCEDURE — 99397 PER PM REEVAL EST PAT 65+ YR: CPT | Performed by: INTERNAL MEDICINE

## 2018-11-07 RX ORDER — LEVOTHYROXINE SODIUM 50 UG/1
50 TABLET ORAL DAILY
Qty: 90 TABLET | Refills: 3 | Status: SHIPPED | OUTPATIENT
Start: 2018-11-07 | End: 2020-01-09

## 2018-11-07 RX ORDER — MELOXICAM 15 MG/1
TABLET ORAL
Qty: 90 TABLET | Refills: 3 | Status: SHIPPED | OUTPATIENT
Start: 2018-11-07 | End: 2020-01-07

## 2018-11-07 RX ORDER — HYDROXYZINE HYDROCHLORIDE 25 MG/1
25 TABLET, FILM COATED ORAL 2 TIMES DAILY PRN
Qty: 60 TABLET | Refills: 3 | Status: SHIPPED | OUTPATIENT
Start: 2018-11-07 | End: 2020-11-13

## 2018-11-07 ASSESSMENT — ENCOUNTER SYMPTOMS
CHILLS: 0
FEVER: 0
MYALGIAS: 1
ARTHRALGIAS: 1
JOINT SWELLING: 1
ABDOMINAL PAIN: 0
DIARRHEA: 0
SORE THROAT: 1
EYE PAIN: 0
HEARTBURN: 0
PARESTHESIAS: 1
PALPITATIONS: 1
NAUSEA: 0
DYSURIA: 0
COUGH: 0
HEMATOCHEZIA: 0
SHORTNESS OF BREATH: 1
HEMATURIA: 0
FREQUENCY: 1
BREAST MASS: 0
NERVOUS/ANXIOUS: 0
HEADACHES: 1
WEAKNESS: 1
CONSTIPATION: 1

## 2018-11-07 ASSESSMENT — ACTIVITIES OF DAILY LIVING (ADL): CURRENT_FUNCTION: HOUSEWORK REQUIRES ASSISTANCE

## 2018-11-07 ASSESSMENT — PAIN SCALES - GENERAL: PAINLEVEL: SEVERE PAIN (6)

## 2018-11-07 NOTE — PROGRESS NOTES
"SUBJECTIVE:   Rozina Bell is a 68 year old female who presents for Preventive Visit.  Are you in the first 12 months of your Medicare coverage?  No    Annual Wellness Visit     In general, how would you rate your overall health?  Fair    Frequency of exercise:  None    Do you usually eat at least 4 servings of fruit and vegetables a day, include whole grains    & fiber and avoid regularly eating high fat or \"junk\" foods?  Yes    Taking medications regularly:  Yes    Medication side effects:  Other    Ability to successfully perform activities of daily living:  Housework requires assistance    Home Safety:  No safety concerns identified    Hearing Impairment:  No hearing concerns    In the past 6 months, have you been bothered by leaking of urine? Yes    In general, how would you rate your overall mental or emotional health?  Excellent    PHQ-2 Total Score: 0    Additional concerns today:  Yes    Fall risk:  Fallen 2 or more times in the past year?: No  Any fall with injury in the past year?: No    COGNITIVE SCREEN  1) Repeat 3 items (Leader, Season, Table)    2) Clock draw: NORMAL  3) 3 item recall: Recalls 3 objects  Results: 3 items recalled: COGNITIVE IMPAIRMENT LESS LIKELY    Mini-CogTM Copyright SANA Momin. Licensed by the author for use in [facility]; reprinted with permission (soob@.[facility]). All rights reserved.      Reviewed and updated as needed this visit by clinical staff  Tobacco  Allergies  Meds         Reviewed and updated as needed this visit by Provider        Social History   Substance Use Topics     Smoking status: Never Smoker     Smokeless tobacco: Never Used      Comment: Lives in smoke free household     Alcohol use No     Alcohol Use 11/7/2018   If you drink alcohol do you typically have greater than 3 drinks per day OR greater than 7 drinks per week? Not Applicable       Weight:  Patient states she has gained the weight she lost a year ago. She attributes it to stress from " "the loss of her brother last November. Patient feels \"dissappointed\" in herself for the weight gain. She feels she does not have the motivation/mindset to exercise as she is dealing with her brother's personal possessions and financial aspect of things.     Grief:  Patient states she is sad that her brother is no longer here but she is dealing with it on her own. She is not in counseling at the moment and doesn't feel like she needs to be in counseling. She feels she has good support and has good friends and family to talk to.     Visual disturbance:  Attributes it to feeling tired. Wears glasses and states when she puts her glasses back on and looks up, vision becomes blurry. She does have a history of cataracts.    SOB/heart palpations:  Once in a while when she feels claustrophobic.     Urinary incontinence:  Has been constant and ongoing, states she always feel incontinent.     Do you feel safe in your environment - Yes    Do you have a Health Care Directive?: No: Advance care planning reviewed with patient; information given to patient to review.    Current providers sharing in care for this patient include:   Patient Care Team:  Deisy Sharpe MD as PCP - Jesse Arango MD as MD (Specialist)  Emmanuel Max MD as MD (Orthopedics)    The following health maintenance items are reviewed in Epic and correct as of today:  Health Maintenance   Topic Date Due     FALL RISK ASSESSMENT  11/06/2018     PHQ-2 Q1 YR  11/06/2018     FIT Q1 YR  11/16/2018     MAMMO SCREEN Q2 YR (SYSTEM ASSIGNED)  10/13/2019     EYE EXAM Q1 YEAR  10/21/2019     TSH Q1 YEAR  10/29/2019     TETANUS IMMUNIZATION (SYSTEM ASSIGNED)  08/08/2022     ADVANCE DIRECTIVE PLANNING Q5 YRS  11/06/2022     LIPID SCREEN Q5 YR FEMALE (SYSTEM ASSIGNED)  10/29/2023     DEXA SCAN SCREENING (SYSTEM ASSIGNED)  Completed     PNEUMOCOCCAL  Completed     INFLUENZA VACCINE  Completed     HEPATITIS C SCREENING  Completed     Labs " "reviewed in [facility]    Mammogram Screening: Patient over age 50, mutual decision to screen reflected in health maintenance.    Review of Systems   Constitutional: Negative for chills and fever.   HENT: Positive for congestion and sore throat. Negative for ear pain and hearing loss.    Eyes: Positive for visual disturbance. Negative for pain.   Respiratory: Positive for shortness of breath. Negative for cough.    Cardiovascular: Positive for palpitations and peripheral edema. Negative for chest pain.   Gastrointestinal: Positive for constipation. Negative for abdominal pain, diarrhea, heartburn, hematochezia and nausea.   Breasts:  Negative for tenderness, breast mass and discharge.   Genitourinary: Positive for frequency and urgency. Negative for dysuria, genital sores, hematuria, pelvic pain, vaginal bleeding and vaginal discharge.   Musculoskeletal: Positive for arthralgias, joint swelling and myalgias.   Skin: Negative for rash.   Neurological: Positive for weakness, headaches and paresthesias.   Psychiatric/Behavioral: Negative for mood changes. The patient is not nervous/anxious.      This document serves as a record of the services and decisions personally performed and made by FRANCESCA LINCOLN MD. It was created on her behalf by Sol Gonzalez, a trained medical scribe. The creation of this document is based the provider's statements to the medical scribe.    Sol Gonzalez November 7, 2018 1:43 PM  OBJECTIVE:   /78  Pulse 82  Temp 97.4  F (36.3  C) (Oral)  Resp 16  Ht 5' 3.39\" (1.61 m)  Wt 282 lb 3.2 oz (128 kg)  SpO2 97%  BMI 49.38 kg/m2 Estimated body mass index is 49.38 kg/(m^2) as calculated from the following:    Height as of this encounter: 5' 3.39\" (1.61 m).    Weight as of this encounter: 282 lb 3.2 oz (128 kg).  Physical Exam  GENERAL APPEARANCE: healthy, alert and no distress  EYES: Eyes grossly normal to inspection, PERRL and conjunctivae and sclerae normal  HENT: ear canals and TM's normal and " nose and mouth without ulcers or lesions  NECK: no adenopathy, no asymmetry, masses, or scars and thyroid normal to palpation  RESP: lungs clear to auscultation - no rales, rhonchi or wheezes    CV: regular rates and rhythm and normal S1 S2, no S3 or S4  LYMPHATICS: normal ant/post cervical and supraclavicular nodes  ABDOMEN: soft, nontender, without hepatosplenomegaly or masses and bowel sounds normal    MS: extremities normal- no gross deformities noted  SKIN: no suspicious lesions or rashes  NEURO: Normal strength and tone, mentation intact and speech normal  PSYCH: mentation appears normal and affect normal/bright  No edema   1+ posterior tibial pulses bilateral          Diagnostic Test Results:  No results found for this or any previous visit (from the past 24 hour(s)).    ASSESSMENT / PLAN:   1. Routine general medical examination at a health care facility      2. Hypothyroidism, unspecified type    - levothyroxine (SYNTHROID/LEVOTHROID) 50 MCG tablet; Take 1 tablet (50 mcg) by mouth daily Patient would like to see the price difference between synthroid and levothyroxine  Dispense: 90 tablet; Refill: 3    3. Hypertension goal BP (blood pressure) < 140/90  Well controlled with medications without side effects.     4. Hypovitaminosis D  Well controlled with medications without side effects.     5. Primary osteoarthritis of both first carpometacarpal joints  Discussed with patient treatment options.  Will have patient discuss with ortho    6. Spinal stenosis of lumbar region, unspecified whether neurogenic claudication present  Per ortho spine    7. Acute seasonal allergic rhinitis due to pollen    - hydrOXYzine (ATARAX) 25 MG tablet; Take 1 tablet (25 mg) by mouth 2 times daily as needed for itching  Dispense: 60 tablet; Refill: 3    8. Primary osteoarthritis of both ankles    - ORTHOTICS REFERRAL    9. Family history of leukemia    - CBC with platelets differential    10. Hypomagnesemia    - Magnesium    11.  "Grief  Patient declines therapy.  Denies depression      End of Life Planning:  Patient currently has an advanced directive: No.  I have verified the patient's ablity to prepare an advanced directive/make health care decisions.  Literature was provided to assist patient in preparing an advanced directive.    COUNSELING:  Special attention given to:       Regular exercise       Healthy diet/nutrition       Vision screening       Dental care       Osteoporosis Prevention/Bone Health       Colon cancer screening    BP Readings from Last 1 Encounters:   11/07/18 128/78     Estimated body mass index is 49.38 kg/(m^2) as calculated from the following:    Height as of this encounter: 5' 3.39\" (1.61 m).    Weight as of this encounter: 282 lb 3.2 oz (128 kg).      Weight management plan: Discussed healthy diet and exercise guidelines and patient will follow up in 12 months in clinic to re-evaluate.     reports that she has never smoked. She has never used smokeless tobacco.      Appropriate preventive services were discussed with this patient, including applicable screening as appropriate for cardiovascular disease, diabetes, osteopenia/osteoporosis, and glaucoma.  As appropriate for age/gender, discussed screening for colorectal cancer, prostate cancer, breast cancer, and cervical cancer. Checklist reviewing preventive services available has been given to the patient.    Reviewed patients plan of care and provided an AVS. The Basic Care Plan (routine screening as documented in Health Maintenance) for Rozina meets the Care Plan requirement. This Care Plan has been established and reviewed with the Patient.    Counseling Resources:  ATP IV Guidelines  Pooled Cohorts Equation Calculator  Breast Cancer Risk Calculator  FRAX Risk Assessment  ICSI Preventive Guidelines  Dietary Guidelines for Americans, 2010  USDA's MyPlate  ASA Prophylaxis  Lung CA Screening    The information in this document, created by the medical scribe for " me, accurately reflects the services I personally performed and the decisions made by me. I have reviewed and approved this document for accuracy prior to leaving the patient care area.    Deisy Sharpe MD  [facility]    START: 1:43 PM  END: 2:20 PM    Patient Instructions       Preventive Health Recommendations  Female Ages 65 +    Yearly exam:     See your health care provider every year in order to  o Review health changes.   o Discuss preventive care.    o Review your medicines if your doctor has prescribed any.      You no longer need a yearly Pap test unless you've had an abnormal Pap test in the past 10 years. If you have vaginal symptoms, such as bleeding or discharge, be sure to talk with your provider about a Pap test.      Every 1 to 2 years, have a mammogram.  If you are over 69, talk with your health care provider about whether or not you want to continue having screening mammograms.      Every 10 years, have a colonoscopy. Or, have a yearly FIT test (stool test). These exams will check for colon cancer.       Have a cholesterol test every 5 years, or more often if your doctor advises it.       Have a diabetes test (fasting glucose) every three years. If you are at risk for diabetes, you should have this test more often.       At age 65, have a bone density scan (DEXA) to check for osteoporosis (brittle bone disease).    Shots:    Get a flu shot each year.    Get a tetanus shot every 10 years.    Talk to your doctor about your pneumonia vaccines. There are now two you should receive - Pneumovax (PPSV 23) and Prevnar (PCV 13).    Talk to your pharmacist about the shingles vaccine.    Talk to your doctor about the hepatitis B vaccine.    Nutrition:     Eat at least 5 servings of fruits and vegetables each day.      Eat whole-grain bread, whole-wheat pasta and brown rice instead of white grains and rice.      Get adequate about Calcium and Vitamin D.     Lifestyle    Exercise at least 150  minutes a week (30 minutes a day, 5 days a week). This will help you control your weight and prevent disease.      Limit alcohol to one drink per day.      No smoking.       Wear sunscreen to prevent skin cancer.       See your dentist twice a year for an exam and cleaning.      See your eye doctor every 1 to 2 years to screen for conditions such as glaucoma, macular degeneration, cataracts, etc     [facility]    If you have any questions regarding to your visit please contact your care team:     Team Pink:   Clinic Hours Telephone Number   Internal Medicine:  Dr. Deisy Porter, NP 7am-7pm  Monday - Thursday   7am-5pm  Fridays  [phone]  (Appointment scheduling available 24/7)   Urgent Care - [place] and [facility] - 11am-9pm Monday-Friday Saturday-Sunday- 9am-5pm   [place] - 5pm-9pm Monday-Friday Saturday-Sunday- 9am-5pm  [phone] - [place]  [phone] - [place]       What options do I have for a visit other than an office visit? We offer electronic visits (e-visits) and telephone visits, when medically appropriate.  Please check with your medical insurance to see if these types of visits are covered, as you will be responsible for any charges that are not paid by your insurance.      You can use [employer] (secure electronic communication) to access to your chart, send your primary care provider a message, or make an appointment. Ask a team member how to get started.     For a price quote for your services, please call our Consumer Price Line at [phone] or our Imaging Cost estimation line at [phone] (for imaging tests).  Discharged By: An

## 2018-11-07 NOTE — LETTER
[address] [place] [address]    November 8, 2018    Rozina Bell   [address] [id number]  [place] [address]  Dear Rozina,    Normal magnesium. Normal blood count    Enclosed is a copy of your results.     Results for orders placed or performed in visit on 11/07/18   CBC with platelets differential   Result Value Ref Range    WBC 5.1 4.0 - 11.0 10e9/L    RBC Count 4.31 3.8 - 5.2 10e12/L    Hemoglobin 13.5 11.7 - 15.7 g/dL    Hematocrit 40.1 35.0 - 47.0 %    MCV 93 78 - 100 fl    MCH 31.3 26.5 - 33.0 pg    MCHC 33.7 31.5 - 36.5 g/dL    RDW 13.2 10.0 - 15.0 %    Platelet Count 240 150 - 450 10e9/L    % Neutrophils 59.9 %    % Lymphocytes 25.2 %    % Monocytes 9.8 %    % Eosinophils 4.1 %    % Basophils 1.0 %    Absolute Neutrophil 3.1 1.6 - 8.3 10e9/L    Absolute Lymphocytes 1.3 0.8 - 5.3 10e9/L    Absolute Monocytes 0.5 0.0 - 1.3 10e9/L    Absolute Eosinophils 0.2 0.0 - 0.7 10e9/L    Absolute Basophils 0.1 0.0 - 0.2 10e9/L    Diff Method Automated Method    Magnesium   Result Value Ref Range    Magnesium 2.1 1.6 - 2.3 mg/dL   If you have any questions or concerns, please call myself or my nurse at [phone].      Sincerely,      Deisy Sharpe MD/fahad

## 2018-11-07 NOTE — MR AVS SNAPSHOT
After Visit Summary   11/7/2018    Rozina Bell    MRN: [id number]           Patient Information     Date Of Birth          1950        Visit Information        Provider Department      11/7/2018 1:30 PM Deisy Sharpe MD [facility]        Today's Diagnoses     Routine general medical examination at a health care facility    -  1    Hypothyroidism, unspecified type        Hypertension goal BP (blood pressure) < 140/90        Hypovitaminosis D        Primary osteoarthritis of both first carpometacarpal joints        Spinal stenosis of lumbar region, unspecified whether neurogenic claudication present        Spinal stenosis, unspecified spinal region        Acute seasonal allergic rhinitis due to pollen        Primary osteoarthritis of both ankles        Family history of leukemia        Hypomagnesemia        Grief          Care Instructions      Preventive Health Recommendations  Female Ages 65 +    Yearly exam:     See your health care provider every year in order to  o Review health changes.   o Discuss preventive care.    o Review your medicines if your doctor has prescribed any.      You no longer need a yearly Pap test unless you've had an abnormal Pap test in the past 10 years. If you have vaginal symptoms, such as bleeding or discharge, be sure to talk with your provider about a Pap test.      Every 1 to 2 years, have a mammogram.  If you are over 69, talk with your health care provider about whether or not you want to continue having screening mammograms.      Every 10 years, have a colonoscopy. Or, have a yearly FIT test (stool test). These exams will check for colon cancer.       Have a cholesterol test every 5 years, or more often if your doctor advises it.       Have a diabetes test (fasting glucose) every three years. If you are at risk for diabetes, you should have this test more often.       At age 65, have a bone density scan (DEXA) to check for osteoporosis  (brittle bone disease).    Shots:    Get a flu shot each year.    Get a tetanus shot every 10 years.    Talk to your doctor about your pneumonia vaccines. There are now two you should receive - Pneumovax (PPSV 23) and Prevnar (PCV 13).    Talk to your pharmacist about the shingles vaccine.    Talk to your doctor about the hepatitis B vaccine.    Nutrition:     Eat at least 5 servings of fruits and vegetables each day.      Eat whole-grain bread, whole-wheat pasta and brown rice instead of white grains and rice.      Get adequate about Calcium and Vitamin D.     Lifestyle    Exercise at least 150 minutes a week (30 minutes a day, 5 days a week). This will help you control your weight and prevent disease.      Limit alcohol to one drink per day.      No smoking.       Wear sunscreen to prevent skin cancer.       See your dentist twice a year for an exam and cleaning.      See your eye doctor every 1 to 2 years to screen for conditions such as glaucoma, macular degeneration, cataracts, etc     [facility]    If you have any questions regarding to your visit please contact your care team:     Team Pink:   Clinic Hours Telephone Number   Internal Medicine:  Dr. Deisy Porter, NP 7am-7pm  Monday - Thursday   7am-5pm  Fridays  [phone]  (Appointment scheduling available 24/7)   Urgent Care - [facility] - 11am-9pm Monday-Friday Saturday-Sunday- 9am-5pm   [place] - 5pm-9pm Monday-Friday Saturday-Sunday- 9am-5pm  [phone] - [place]  [phone] - [place]       What options do I have for a visit other than an office visit? We offer electronic visits (e-visits) and telephone visits, when medically appropriate.  Please check with your medical insurance to see if these types of visits are covered, as you will be responsible for any charges that are not paid by your insurance.      You can use [employer] (secure electronic communication) to  "access to your chart, send your primary care provider a message, or make an appointment. Ask a team member how to get started.     For a price quote for your services, please call our Consumer Price Line at [phone] or our Imaging Cost estimation line at [phone] (for imaging tests).  Discharged By: An            Follow-ups after your visit        Additional Services     ORTHOTICS REFERRAL       Foot orthotic  [facility]                  Follow-up notes from your care team     Return in about 1 year (around 11/7/2019) for Annual Wellness Visit.      Who to contact     If you have questions or need follow up information about today's clinic visit or your schedule please contact [facility] directly at [phone].  Normal or non-critical lab and imaging results will be communicated to you by MyChart, letter or phone within 4 business days after the clinic has received the results. If you do not hear from us within 7 days, please contact the clinic through MyChart or phone. If you have a critical or abnormal lab result, we will notify you by phone as soon as possible.  Submit refill requests through [employer] or call your pharmacy and they will forward the refill request to us. Please allow 3 business days for your refill to be completed.          Additional Information About Your Visit        Care EveryWhere ID     This is your Care EveryWhere ID. This could be used by other organizations to access your [facility] medical records  [id number]        Your Vitals Were     Pulse Temperature Respirations Height Pulse Oximetry BMI (Body Mass Index)    82 97.4  F (36.3  C) (Oral) 16 5' 3.39\" (1.61 m) 97% 49.38 kg/m2       Blood Pressure from Last 3 Encounters:   11/07/18 128/78   09/25/18 184/90   07/25/18 146/84    Weight from Last 3 Encounters:   11/07/18 282 lb 3.2 oz (128 kg)   01/03/18 271 lb (122.9 kg)   12/27/17 271 lb (122.9 kg)              We Performed the Following     CBC with platelets " differential     Magnesium     ORTHOTICS REFERRAL          Where to get your medicines      These medications were sent to [id number] - [place], [place] - [address] [address] [place]  [address] [address] [place] [address]     Phone:  [phone]     hydrOXYzine 25 MG tablet    levothyroxine 50 MCG tablet    meloxicam 15 MG tablet          Primary Care Provider Office Phone # Fax #    Deisy Sharpe [phone] [address] [place] [address]        Equal Access to Services     [facility]: Hadii aad ku hadasho Soomaali, waaxda luqadaha, qaybta kaalmada adeegyada, waxay ashiain hayaanadine tavares . So [facility] [phone].    ATENCIÓN: Si habla español, tiene a perkins disposición servicios gratuitos de asistencia lingüística. [facility] [phone].    We comply with applicable federal civil rights laws and [place] laws. We do not discriminate on the basis of race, color, national origin, age, disability, sex, sexual orientation, or gender identity.            Thank you!     Thank you for choosing [facility]  for your care. Our goal is always to provide you with excellent care. Hearing back from our patients is one way we can continue to improve our services. Please take a few minutes to complete the written survey that you may receive in the mail after your visit with us. Thank you!             Your Updated Medication List - Protect others around you: Learn how to safely use, store and throw away your medicines at www.disposemymeds.org.          This list is accurate as of 11/7/18  2:17 PM.  Always use your most recent med list.                   Brand Name Dispense Instructions for use Diagnosis    calcium + D 600-200 MG-UNIT Tabs   Generic drug:  calcium carbonate-vitamin D      1 TABLET TWICE  DAILY        GLUCOSAMINE CHONDROITIN Tabs      1 TABLET DAILY        hydrOXYzine 25 MG tablet    ATARAX    60 tablet    Take 1 tablet (25 mg) by mouth 2 times daily as  needed for itching    Acute seasonal allergic rhinitis due to pollen       magnesium oxide 400 MG Caps     270 capsule    Take 1 tablet by mouth 3 times daily    Hypomagnesemia       meloxicam 15 MG tablet    MOBIC    90 tablet    TAKE 1 TABLET BY MOUTH EVERY DAY.    Spinal stenosis, unspecified spinal region       MULTI-VITAMIN PO      None Entered        order for DME     1 Device    Equipment being ordered: compression stocking joey    Edema       order for DME     1 Device    Equipment being ordered: [id number] Thumb Splint, CMC RT, MD  [id number] Thumb splint, CMC, LT MD    Primary osteoarthritis of first carpometacarpal joint of right hand, Primary osteoarthritis of first carpometacarpal joint of left hand       * SYNTHROID 50 MCG tablet   Generic drug:  levothyroxine     90 tablet    Take 1 tablet (50 mcg) by mouth daily    Acquired hypothyroidism       * levothyroxine 50 MCG tablet    SYNTHROID/LEVOTHROID    90 tablet    Take 1 tablet (50 mcg) by mouth daily Patient would like to see the price difference between synthroid and levothyroxine    Hypothyroidism, unspecified type       VITAMIN C PO      Take 500 mg by mouth daily        vitamin D 1000 units capsule     180 capsule    Take 2,000 Units by mouth daily    Hypovitaminosis D       VITAMIN E PO      1 TABLET DAILY        * Notice:  This list has 2 medication(s) that are the same as other medications prescribed for you. Read the directions carefully, and ask your doctor or other care provider to review them with you.

## 2018-11-12 ENCOUNTER — TELEPHONE (OUTPATIENT)
Dept: OTOLARYNGOLOGY | Facility: CLINIC | Age: 68
End: 2018-11-12

## 2018-11-12 DIAGNOSIS — J01.01 ACUTE RECURRENT MAXILLARY SINUSITIS: Primary | ICD-10-CM

## 2018-11-12 NOTE — TELEPHONE ENCOUNTER
Patient has been experiencing thick green/yellow nasal drainage, headaches, jaw pain, dental pain, and maxillary facial pressure for the past month. She was treated with Doxy on 9/25/18 which provided temporary relief. She was also doing nasal saline irrigations however stopped because it made her nose feel dry and bleed a little. Recommended use of Aquaphor in the nose to maintain moisture. Patient is wondering if she needs to be treated with a longer course of abx or additional tx. Routed message to Dr. Dias.    ([facility])    Santos Miller RN....11/12/2018 3:51 PM

## 2018-11-12 NOTE — TELEPHONE ENCOUNTER
Reason for call:  Patient reporting a symptom    Symptom or request: Sinus    Duration (how long have symptoms been present): on going    Have you been treated for this before? Yes    Additional comments: Patient calling getting back to Dr. Dias after finishing antibiotic seemed to work, but maybe not long enough. Symptoms back worse than before. Please advise    Phone Number patient can be reached at:  Home number on file [phone] (home)    Best Time:  ASAP    Can we leave a detailed message on this number:  YES    Call taken on 11/12/2018 at 3:39 PM by Annika Enrique

## 2018-11-13 RX ORDER — FLUTICASONE PROPIONATE 50 MCG
2 SPRAY, SUSPENSION (ML) NASAL DAILY
Qty: 16 G | Refills: 3 | Status: SHIPPED | OUTPATIENT
Start: 2018-11-13 | End: 2020-08-03

## 2018-11-13 RX ORDER — DOXYCYCLINE 100 MG/1
100 CAPSULE ORAL 2 TIMES DAILY
Qty: 42 CAPSULE | Refills: 0 | Status: SHIPPED | OUTPATIENT
Start: 2018-11-13 | End: 2018-12-04

## 2018-11-13 NOTE — TELEPHONE ENCOUNTER
Called patient and informed her that a 21 day course of Doxy was sent to [facility] pharmacy in [place] and recommended f/u if symptoms persist. Also encouraged use of Flonase and saline irrigations. Patient verbalized understanding and has no other questions.    Santos Miller RN....11/13/2018 8:29 AM

## 2018-11-13 NOTE — TELEPHONE ENCOUNTER
Doxycycline, 3 weeks worth, sent to pharmacy. I can find no other antibiotics that she isn't allergic too or that don't have yellow dye in them, which she is allergic too. If this fails she needs to return with a culture. She should use nasal saline irrigations and flonase as well.

## 2018-11-14 ENCOUNTER — TRANSFERRED RECORDS (OUTPATIENT)
Dept: HEALTH INFORMATION MANAGEMENT | Facility: CLINIC | Age: 68
End: 2018-11-14

## 2018-11-14 LAB — HEMOCCULT STL QL IA: NEGATIVE

## 2019-08-14 ENCOUNTER — OFFICE VISIT (OUTPATIENT)
Dept: OTOLARYNGOLOGY | Facility: CLINIC | Age: 69
End: 2019-08-14
Payer: MEDICARE

## 2019-08-14 ENCOUNTER — ANCILLARY PROCEDURE (OUTPATIENT)
Dept: CT IMAGING | Facility: CLINIC | Age: 69
End: 2019-08-14
Attending: OTOLARYNGOLOGY
Payer: MEDICARE

## 2019-08-14 VITALS
OXYGEN SATURATION: 98 % | HEIGHT: 63 IN | RESPIRATION RATE: 12 BRPM | WEIGHT: 280 LBS | HEART RATE: 77 BPM | BODY MASS INDEX: 49.61 KG/M2 | DIASTOLIC BLOOD PRESSURE: 80 MMHG | SYSTOLIC BLOOD PRESSURE: 149 MMHG

## 2019-08-14 DIAGNOSIS — J30.2 SEASONAL ALLERGIC RHINITIS, UNSPECIFIED TRIGGER: ICD-10-CM

## 2019-08-14 DIAGNOSIS — J32.0 CHRONIC MAXILLARY SINUSITIS: ICD-10-CM

## 2019-08-14 DIAGNOSIS — J32.0 CHRONIC MAXILLARY SINUSITIS: Primary | ICD-10-CM

## 2019-08-14 DIAGNOSIS — R04.0 EPISTAXIS: ICD-10-CM

## 2019-08-14 PROCEDURE — 30901 CONTROL OF NOSEBLEED: CPT | Performed by: OTOLARYNGOLOGY

## 2019-08-14 PROCEDURE — 99214 OFFICE O/P EST MOD 30 MIN: CPT | Mod: 25 | Performed by: OTOLARYNGOLOGY

## 2019-08-14 PROCEDURE — 70486 CT MAXILLOFACIAL W/O DYE: CPT | Mod: TC

## 2019-08-14 ASSESSMENT — MIFFLIN-ST. JEOR: SCORE: 1769.2

## 2019-08-14 NOTE — PROGRESS NOTES
Chief Complaint - sinusitis    History of Present Illness - Rozina Bell is a 69 year old female who returns for evaluation of possible sinusitis. I last saw her 9/2018 and treated her with doxycycline for sinusitis. The patient describes symptoms of postnasal drainage, purulent, scabs in the nose, sore throat, some epistaxis. symptoms for the past few months. The patient notes lots allergies. She has done flonase in the past, that dried it out so she stopped. Treatments have included nothing. She doesn't like nasal irrigations. Tries aquaphor.  No prior history of sinus surgery.    I've also seen her in the past for epistaxis. I cauterized the left side 1/2018. She is having more epistaxis on the right the last few weeks.     Past Medical History -   Patient Active Problem List   Diagnosis     Hypothyroidism     Allergic state     Spinal stenosis     CARDIOVASCULAR SCREENING; LDL GOAL LESS THAN 160     Fibromyalgia     Obesity     AK (actinic keratosis)     Hypertension goal BP (blood pressure) < 140/90     Skin cancer     Advanced directives, counseling/discussion     Degenerative spondylolisthesis     Metatarsal fracture     Prepatellar bursitis     MMT (medial meniscus tear)     Lumbar spinal stenosis     Hypovitaminosis D     Primary osteoarthritis of both first carpometacarpal joints     Morbid obesity (H)     Hip pain, left     Family history of leukemia     Primary osteoarthritis of both ankles     Hypomagnesemia       Current Medications -   Current Outpatient Medications:      Ascorbic Acid (VITAMIN C PO), Take 500 mg by mouth daily, Disp: , Rfl:      CALCIUM + D 600-200 MG-UNIT OR TABS, 1 TABLET TWICE  DAILY, Disp: , Rfl:      Cholecalciferol (VITAMIN D) 1000 UNITS capsule, Take 2,000 Units by mouth daily, Disp: 180 capsule, Rfl: 4     fluticasone (FLONASE) 50 MCG/ACT spray, Spray 2 sprays into both nostrils daily, Disp: 16 g, Rfl: 3     GLUCOSAMINE CHONDROITIN OR TABS, 1 TABLET DAILY, Disp: , Rfl:       hydrOXYzine (ATARAX) 25 MG tablet, Take 1 tablet (25 mg) by mouth 2 times daily as needed for itching, Disp: 60 tablet, Rfl: 3     levothyroxine (SYNTHROID/LEVOTHROID) 50 MCG tablet, Take 1 tablet (50 mcg) by mouth daily Patient would like to see the price difference between synthroid and levothyroxine, Disp: 90 tablet, Rfl: 3     magnesium oxide 400 MG CAPS, Take 1 tablet by mouth 3 times daily, Disp: 270 capsule, Rfl: 4     meloxicam (MOBIC) 15 MG tablet, TAKE 1 TABLET BY MOUTH EVERY DAY., Disp: 90 tablet, Rfl: 3     MULTI-VITAMIN OR, None Entered, Disp: , Rfl:      order for DME, Equipment being ordered: [id number] Thumb Splint, CMC RT, MD  [id number] Thumb splint, CMC, LT MD (Patient not taking: Reported on 11/7/2018), Disp: 1 Device, Rfl: 0     ORDER FOR DME, Equipment being ordered: compression stocking joey (Patient not taking: Reported on 11/7/2018), Disp: 1 Device, Rfl: 0     SYNTHROID 50 MCG tablet, Take 1 tablet (50 mcg) by mouth daily (Patient not taking: Reported on 11/7/2018), Disp: 90 tablet, Rfl: 3     VITAMIN E OR, 1 TABLET DAILY, Disp: , Rfl:     Allergies -   Allergies   Allergen Reactions     Amitriptyline      jittery     Celebrex [Celecoxib]      Epinephrine-Lidocaine-Na Metabisulfite Other (See Comments)     Palpitations, flushing     Latex      Morphine      Penicillins      Sulfa Drugs      Ultram [Tramadol Hcl]      Yellow Dye      Can't remember, but ends up in ER with reaction     Zithromax [Azithromycin]        Social History -   Social History     Socioeconomic History     Marital status:      Spouse name: Not on file     Number of children: Not on file     Years of education: Not on file     Highest education level: Not on file   Occupational History     Not on file   Social Needs     Financial resource strain: Not on file     Food insecurity:     Worry: Not on file     Inability: Not on file     Transportation needs:     Medical: Not on file     Non-medical: Not on file  "  Tobacco Use     Smoking status: Never Smoker     Smokeless tobacco: Never Used     Tobacco comment: Lives in smoke free household   Substance and Sexual Activity     Alcohol use: No     Drug use: No     Sexual activity: Not Currently     Partners: Male   Lifestyle     Physical activity:     Days per week: Not on file     Minutes per session: Not on file     Stress: Not on file   Relationships     Social connections:     Talks on phone: Not on file     Gets together: Not on file     Attends Zoroastrianism service: Not on file     Active member of club or organization: Not on file     Attends meetings of clubs or organizations: Not on file     Relationship status: Not on file     Intimate partner violence:     Fear of current or ex partner: Not on file     Emotionally abused: Not on file     Physically abused: Not on file     Forced sexual activity: Not on file   Other Topics Concern     Parent/sibling w/ CABG, MI or angioplasty before 65F 55M? Not Asked   Social History Narrative     Not on file       Family History -   Family History   Problem Relation Age of Onset     Heart Disease Mother      Arthritis Mother      Cardiovascular Mother      Thyroid Disease Mother      Hypertension Mother      Cerebrovascular Disease Mother      Gastrointestinal Disease Father      Cancer Father      Respiratory Father      Heart Disease Maternal Grandfather      Diabetes Paternal Grandfather      Cancer Brother      Diabetes Brother      Heart Disease Sister      Neurologic Disorder Sister         EPILEPSY     Allergies Son      Heart Disease Brother         TRIPLE BYPASS     Lipids Brother      Hypertension Brother      Chronic Obstructive Pulmonary Disease Brother      Glaucoma No family hx of      Macular Degeneration No family hx of      Review of Systems - As per HPI and PMHx, otherwise 7 system review of the head and neck is negative.    Physical Exam  BP (!) 149/80   Pulse 77   Resp 12   Ht 1.6 m (5' 3\")   Wt 127 kg (280 " lb)   SpO2 98%   BMI 49.60 kg/m    General - The patient is nontoxic, in no distress. Alert and oriented to person and place, answers questions and cooperates with examination appropriately.   Neurologic - CN II-XII are intact. No focal neurologic deficits.   Voice and Breathing - The patient was breathing comfortably without the use of accessory muscles. There was no wheezing, stridor, or stertor.  The patients voice was clear and strong.  Eyes - Extraocular movements intact.  Sclera were not icteric or injected, conjunctiva were pink and moist.  Mouth - Examination of the oral cavity showed pink, healthy oral mucosa. No lesions or ulcerations noted.  The tongue was mobile and midline.  Throat - The walls of the oropharynx were smooth, symmetric, and had no lesions or ulcerations.  No postnasal drainage.  The uvula was midline on elevation.  Nose - External contour is symmetric, no gross deflection or scars. The septum was midline and non-obstructive, turbinates with some hypertrophy.  No polyps, masses, or purulence noted on examination.  She does have some dried blood and superficial blood vessels on the right.    Neck - Palpation of the occipital, submental, submandibular, internal jugular chain, and supraclavicular nodes did not demonstrate any abnormal lymph nodes or masses. No parotid masses. Palpation of the thyroid was soft and smooth, with no nodules or goiter appreciated.  The trachea was mobile and midline.    Procudure - Therefore we discussed hydration measures versus silver nitrate cautery. She preferred silver nitrate cautery.  I sprayed a small amount of phenylephrine lidocaine spray in the right anterior nasal septum.  I then applied 2 sticks of silver nitrate to the superficial blood vessels on the septum.  Hemostasis was achieved.      A/P - Rozina Bell is a 69 year old female with recurrent versus chronic sinusitis versus allergic rhinitis.  She has also been having recurrent epistaxis,  this time on the right.  She did well with nasal cautery in the left last time.  Therefore I cauterized the right nasal septum.     I recommend a CT scan to determine if the patient is having sinusitis and to visualize the anatomy. We will review the study at that time to determine any evidence of sinus disease that might be helped with surgical intervention.  I also recommend an allergy evaluation and treatment as needed.  It seems likely she is having allergy symptoms that may be exacerbating her causing her sinusitis.      Marcos Dias MD  Otolaryngology  [facility]

## 2019-08-14 NOTE — LETTER
8/14/2019         RE: Rozina Bell  [address] [address]  [place] [address]        Dear Colleague,    Thank you for referring your patient, Rozina Bell, to the [facility]. Please see a copy of my visit note below.    Chief Complaint - sinusitis    History of Present Illness - Rozina Bell is a 69 year old female who returns for evaluation of possible sinusitis. I last saw her 9/2018 and treated her with doxycycline for sinusitis. The patient describes symptoms of postnasal drainage, purulent, scabs in the nose, sore throat, some epistaxis. symptoms for the past few months. The patient notes lots allergies. She has done flonase in the past, that dried it out so she stopped. Treatments have included nothing. She doesn't like nasal irrigations. Tries aquaphor.  No prior history of sinus surgery.    I've also seen her in the past for epistaxis. I cauterized the left side 1/2018. She is having more epistaxis on the right the last few weeks.     Past Medical History -   Patient Active Problem List   Diagnosis     Hypothyroidism     Allergic state     Spinal stenosis     CARDIOVASCULAR SCREENING; LDL GOAL LESS THAN 160     Fibromyalgia     Obesity     AK (actinic keratosis)     Hypertension goal BP (blood pressure) < 140/90     Skin cancer     Advanced directives, counseling/discussion     Degenerative spondylolisthesis     Metatarsal fracture     Prepatellar bursitis     MMT (medial meniscus tear)     Lumbar spinal stenosis     Hypovitaminosis D     Primary osteoarthritis of both first carpometacarpal joints     Morbid obesity (H)     Hip pain, left     Family history of leukemia     Primary osteoarthritis of both ankles     Hypomagnesemia       Current Medications -   Current Outpatient Medications:      Ascorbic Acid (VITAMIN C PO), Take 500 mg by mouth daily, Disp: , Rfl:      CALCIUM + D 600-200 MG-UNIT OR TABS, 1 TABLET TWICE  DAILY, Disp: , Rfl:      Cholecalciferol (VITAMIN D) 1000  UNITS capsule, Take 2,000 Units by mouth daily, Disp: 180 capsule, Rfl: 4     fluticasone (FLONASE) 50 MCG/ACT spray, Spray 2 sprays into both nostrils daily, Disp: 16 g, Rfl: 3     GLUCOSAMINE CHONDROITIN OR TABS, 1 TABLET DAILY, Disp: , Rfl:      hydrOXYzine (ATARAX) 25 MG tablet, Take 1 tablet (25 mg) by mouth 2 times daily as needed for itching, Disp: 60 tablet, Rfl: 3     levothyroxine (SYNTHROID/LEVOTHROID) 50 MCG tablet, Take 1 tablet (50 mcg) by mouth daily Patient would like to see the price difference between synthroid and levothyroxine, Disp: 90 tablet, Rfl: 3     magnesium oxide 400 MG CAPS, Take 1 tablet by mouth 3 times daily, Disp: 270 capsule, Rfl: 4     meloxicam (MOBIC) 15 MG tablet, TAKE 1 TABLET BY MOUTH EVERY DAY., Disp: 90 tablet, Rfl: 3     MULTI-VITAMIN OR, None Entered, Disp: , Rfl:      order for DME, Equipment being ordered: [id number] Thumb Splint, CMC RT, MD  [id number] Thumb splint, CMC, LT MD (Patient not taking: Reported on 11/7/2018), Disp: 1 Device, Rfl: 0     ORDER FOR DME, Equipment being ordered: compression stocking joey (Patient not taking: Reported on 11/7/2018), Disp: 1 Device, Rfl: 0     SYNTHROID 50 MCG tablet, Take 1 tablet (50 mcg) by mouth daily (Patient not taking: Reported on 11/7/2018), Disp: 90 tablet, Rfl: 3     VITAMIN E OR, 1 TABLET DAILY, Disp: , Rfl:     Allergies -   Allergies   Allergen Reactions     Amitriptyline      jittery     Celebrex [Celecoxib]      Epinephrine-Lidocaine-Na Metabisulfite Other (See Comments)     Palpitations, flushing     Latex      Morphine      Penicillins      Sulfa Drugs      Ultram [Tramadol Hcl]      Yellow Dye      Can't remember, but ends up in ER with reaction     Zithromax [Azithromycin]        Social History -   Social History     Socioeconomic History     Marital status:      Spouse name: Not on file     Number of children: Not on file     Years of education: Not on file     Highest education level: Not on file    Occupational History     Not on file   Social Needs     Financial resource strain: Not on file     Food insecurity:     Worry: Not on file     Inability: Not on file     Transportation needs:     Medical: Not on file     Non-medical: Not on file   Tobacco Use     Smoking status: Never Smoker     Smokeless tobacco: Never Used     Tobacco comment: Lives in smoke free household   Substance and Sexual Activity     Alcohol use: No     Drug use: No     Sexual activity: Not Currently     Partners: Male   Lifestyle     Physical activity:     Days per week: Not on file     Minutes per session: Not on file     Stress: Not on file   Relationships     Social connections:     Talks on phone: Not on file     Gets together: Not on file     Attends Buddhism service: Not on file     Active member of club or organization: Not on file     Attends meetings of clubs or organizations: Not on file     Relationship status: Not on file     Intimate partner violence:     Fear of current or ex partner: Not on file     Emotionally abused: Not on file     Physically abused: Not on file     Forced sexual activity: Not on file   Other Topics Concern     Parent/sibling w/ CABG, MI or angioplasty before 65F 55M? Not Asked   Social History Narrative     Not on file       Family History -   Family History   Problem Relation Age of Onset     Heart Disease Mother      Arthritis Mother      Cardiovascular Mother      Thyroid Disease Mother      Hypertension Mother      Cerebrovascular Disease Mother      Gastrointestinal Disease Father      Cancer Father      Respiratory Father      Heart Disease Maternal Grandfather      Diabetes Paternal Grandfather      Cancer Brother      Diabetes Brother      Heart Disease Sister      Neurologic Disorder Sister         EPILEPSY     Allergies Son      Heart Disease Brother         TRIPLE BYPASS     Lipids Brother      Hypertension Brother      Chronic Obstructive Pulmonary Disease Brother      Glaucoma No family  "hx of      Macular Degeneration No family hx of      Review of Systems - As per HPI and PMHx, otherwise 7 system review of the head and neck is negative.    Physical Exam  BP (!) 149/80   Pulse 77   Resp 12   Ht 1.6 m (5' 3\")   Wt 127 kg (280 lb)   SpO2 98%   BMI 49.60 kg/m     General - The patient is nontoxic, in no distress. Alert and oriented to person and place, answers questions and cooperates with examination appropriately.   Neurologic - CN II-XII are intact. No focal neurologic deficits.   Voice and Breathing - The patient was breathing comfortably without the use of accessory muscles. There was no wheezing, stridor, or stertor.  The patients voice was clear and strong.  Eyes - Extraocular movements intact.  Sclera were not icteric or injected, conjunctiva were pink and moist.  Mouth - Examination of the oral cavity showed pink, healthy oral mucosa. No lesions or ulcerations noted.  The tongue was mobile and midline.  Throat - The walls of the oropharynx were smooth, symmetric, and had no lesions or ulcerations.  No postnasal drainage.  The uvula was midline on elevation.  Nose - External contour is symmetric, no gross deflection or scars. The septum was midline and non-obstructive, turbinates with some hypertrophy.  No polyps, masses, or purulence noted on examination.  She does have some dried blood and superficial blood vessels on the right.    Neck - Palpation of the occipital, submental, submandibular, internal jugular chain, and supraclavicular nodes did not demonstrate any abnormal lymph nodes or masses. No parotid masses. Palpation of the thyroid was soft and smooth, with no nodules or goiter appreciated.  The trachea was mobile and midline.    Procudure - Therefore we discussed hydration measures versus silver nitrate cautery. She preferred silver nitrate cautery.  I sprayed a small amount of phenylephrine lidocaine spray in the right anterior nasal septum.  I then applied 2 sticks of silver " nitrate to the superficial blood vessels on the septum.  Hemostasis was achieved.      A/P - Rozina Bell is a 69 year old female with recurrent versus chronic sinusitis versus allergic rhinitis.  She has also been having recurrent epistaxis, this time on the right.  She did well with nasal cautery in the left last time.  Therefore I cauterized the right nasal septum.     I recommend a CT scan to determine if the patient is having sinusitis and to visualize the anatomy. We will review the study at that time to determine any evidence of sinus disease that might be helped with surgical intervention.  I also recommend an allergy evaluation and treatment as needed.  It seems likely she is having allergy symptoms that may be exacerbating her causing her sinusitis.      Marcos Dias MD  Otolaryngology  [facility]      Again, thank you for allowing me to participate in the care of your patient.        Sincerely,        Marcos Dias MD

## 2019-08-14 NOTE — PATIENT INSTRUCTIONS
General Scheduling Information  To schedule your CT/MRI scan, please contact [place] [facility] at [phone] [address] [place] [address]    To schedule your Surgery, please contact our Specialty Schedulers at [phone]    ENT Clinic Locations Clinic Hours Telephone Number     [facility]  [address] [place] [address]   Tuesday:       8:00am -- 4:00pm    Wednesday:  8:00am - 4:00pm   To schedule an appointment with   Dr. Dias,   please contact our   Specialty Scheduling Department at:     [phone]       [facility]  [address] [place] [address]   Friday:          8:00am - 4:00pm         Urgent Care Locations Clinic Hours Telephone Numbers     [facility]eng  [address] [place] [address]     Monday-Friday:     11:00pm - 9:00pm    Saturday-Sunday:  9:00am - 5:00pm   [phone]     [facility]arton  [address] [place] [address]     Monday-Friday:      5:00pm - 9:00pm     Saturday-Sunday:  9:00am - 5:00pm   [phone]

## 2019-08-15 ENCOUNTER — TELEPHONE (OUTPATIENT)
Dept: OTOLARYNGOLOGY | Facility: CLINIC | Age: 69
End: 2019-08-15

## 2019-08-15 DIAGNOSIS — J30.2 SEASONAL ALLERGIC RHINITIS, UNSPECIFIED TRIGGER: Primary | ICD-10-CM

## 2019-08-15 NOTE — TELEPHONE ENCOUNTER
CT sinus shows no sinusitis, just some inflammation. I think most of her nasal symptoms are due to allergies I recommend allergy referral.

## 2019-09-23 NOTE — PROGRESS NOTES
"Subjective     Rozina Bell is a 69 year old female who presents to clinic today for the following health issues:    HPI   Concern - ear pain  Onset: 2 week    Description:   Pain and different sound    Intensity: moderate    Progression of Symptoms:  same    Accompanying Signs & Symptoms:      Previous history of similar problem:       Precipitating factors:   Worsened by: touch    Alleviating factors:  Improved by:     Therapies Tried and outcome: none    Patient is here with concern about pain and fullness in the left ear for the last 2 weeks. She says it is somewhat improved today but still present she would like to be sure there is no infection in there. She also has chronic nasal allergies and sinus inflammation and has not done well on previously prescribed nasal steroid.   -------------------------------------    BP Readings from Last 3 Encounters:   09/24/19 136/82   08/14/19 (!) 149/80   11/07/18 128/78    Wt Readings from Last 3 Encounters:   09/24/19 115.2 kg (254 lb)   08/14/19 127 kg (280 lb)   11/07/18 128 kg (282 lb 3.2 oz)        -------------------------------------  Reviewed and updated as needed this visit by Provider  Tobacco  Allergies  Meds  Problems  Med Hx  Surg Hx  Fam Hx  Soc Hx          Review of Systems   ROS COMP: Constitutional, HEENT, cardiovascular, pulmonary, gi and gu systems are negative, except as otherwise noted.      Objective    /82   Pulse 85   Temp 98.5  F (36.9  C)   Resp 16   Ht 1.6 m (5' 3\")   Wt 115.2 kg (254 lb)   SpO2 98%   BMI 44.99 kg/m    Body mass index is 44.99 kg/m .  Physical Exam   GENERAL: alert and no distress  EYES: Eyes grossly normal to inspection  HENT: normal cephalic/atraumatic, right ear: clear effusion, left ear: clear effusion, nose and mouth without ulcers or lesions, oropharynx clear, oral mucous membranes moist and sinuses: maxillary tenderness on left  NECK: no adenopathy  RESP: lungs clear to auscultation - no rales, " "rhonchi or wheezes  CV: regular rates and rhythm, no murmur, click or rub, peripheral pulses strong and no peripheral edema  MS: no gross musculoskeletal defects noted, no edema  SKIN: no suspicious lesions or rashes    Diagnostic Test Results:  Labs reviewed in Epic        Assessment & Plan       ICD-10-CM    1. Allergic rhinitis, unspecified seasonality, unspecified trigger J30.9 mometasone (NASONEX) 50 MCG/ACT nasal spray   2. Ear congestion, unspecified laterality H93.8X9         BMI:   Estimated body mass index is 44.99 kg/m  as calculated from the following:    Height as of this encounter: 1.6 m (5' 3\").    Weight as of this encounter: 115.2 kg (254 lb).   Weight management plan: Patient was referred to their PCP to discuss a diet and exercise plan.        I discussed symptoms and concerns with patient and will have her try an alternate nasal spray and have her use heat packs and OTC medicines to help with congestion. Follow up if symptoms are persisting or not improving.   See Patient Instructions    Return in about 10 days (around 10/4/2019).    Selam Estrada PA-C  [facility]      "

## 2019-09-24 ENCOUNTER — OFFICE VISIT (OUTPATIENT)
Dept: FAMILY MEDICINE | Facility: CLINIC | Age: 69
End: 2019-09-24
Payer: MEDICARE

## 2019-09-24 ENCOUNTER — TELEPHONE (OUTPATIENT)
Dept: FAMILY MEDICINE | Facility: CLINIC | Age: 69
End: 2019-09-24

## 2019-09-24 VITALS
HEART RATE: 85 BPM | SYSTOLIC BLOOD PRESSURE: 136 MMHG | OXYGEN SATURATION: 98 % | HEIGHT: 63 IN | WEIGHT: 254 LBS | RESPIRATION RATE: 16 BRPM | BODY MASS INDEX: 45 KG/M2 | DIASTOLIC BLOOD PRESSURE: 82 MMHG | TEMPERATURE: 98.5 F

## 2019-09-24 DIAGNOSIS — H93.8X9 EAR CONGESTION, UNSPECIFIED LATERALITY: ICD-10-CM

## 2019-09-24 DIAGNOSIS — J30.9 ALLERGIC RHINITIS, UNSPECIFIED SEASONALITY, UNSPECIFIED TRIGGER: Primary | ICD-10-CM

## 2019-09-24 DIAGNOSIS — Z23 NEED FOR PROPHYLACTIC VACCINATION AND INOCULATION AGAINST INFLUENZA: ICD-10-CM

## 2019-09-24 DIAGNOSIS — J32.0 CHRONIC MAXILLARY SINUSITIS: Primary | ICD-10-CM

## 2019-09-24 DIAGNOSIS — J30.9 ALLERGIC RHINITIS, UNSPECIFIED SEASONALITY, UNSPECIFIED TRIGGER: ICD-10-CM

## 2019-09-24 PROCEDURE — G0008 ADMIN INFLUENZA VIRUS VAC: HCPCS | Performed by: PHYSICIAN ASSISTANT

## 2019-09-24 PROCEDURE — 99214 OFFICE O/P EST MOD 30 MIN: CPT | Performed by: PHYSICIAN ASSISTANT

## 2019-09-24 PROCEDURE — 90662 IIV NO PRSV INCREASED AG IM: CPT | Performed by: PHYSICIAN ASSISTANT

## 2019-09-24 RX ORDER — MOMETASONE FUROATE MONOHYDRATE 50 UG/1
2 SPRAY, METERED NASAL EVERY OTHER DAY
Qty: 17 G | Refills: 3 | Status: SHIPPED | OUTPATIENT
Start: 2019-09-24 | End: 2020-11-13

## 2019-09-24 ASSESSMENT — MIFFLIN-ST. JEOR: SCORE: 1651.27

## 2019-09-24 NOTE — PATIENT INSTRUCTIONS
[facility]    If you have any questions regarding to your visit please contact your care team:       Team Red:   Clinic Hours Telephone Number   Dr. Leena Mcknight, NP   7am-7pm  Monday - Thursday   7am-5pm  Fridays  [phone]  (Appointment scheduling available 24/7)    Questions about your recent visit?   Team Line  [phone]   Urgent Care - [place] [facility] - 11am-9pm Monday-Friday Saturday-Sunday- 9am-5pm   [place] - 5pm-9pm Monday-Friday Saturday-Sunday- 9am-5pm  [phone] - [place]  [phone] - [place]       What options do I have for a visit other than an office visit? We offer electronic visits (e-visits) and telephone visits, when medically appropriate.  Please check with your medical insurance to see if these types of visits are covered, as you will be responsible for any charges that are not paid by your insurance.      You can use [employer] (secure electronic communication) to access to your chart, send your primary care provider a message, or make an appointment. Ask a team member how to get started.     For a price quote for your services, please call our Consumer Price Line at [phone] or our Imaging Cost estimation line at [phone] (for imaging tests).

## 2019-09-24 NOTE — LETTER
2019    INSURER: Payor: MEDICARE / Plan: MEDICARE / Product Type: Medicare /   ATTN: [employer]   Re: Prior Authorization Request  Patient: Rozina Bell  Policy ID#:  [id number]  : 1950      To Whom it May Concern:    I am writing to formally request a prior authorization of coverage for my patient,  Rozina Bell, for treatment using Nasonex.  I am requesting authorization for applicable provider professional and facility services associated with this therapy.    The therapy involves regular use for chronic nasal congestion and nasal allergies.      The benefits of the therapy include decrease in inflammation and swelling caused by nasal allergies.      I have treated Rozina Bell since 2019 and I have determined that it is medically appropriate for  this patient to receive be treated with Nasonex for the reason(s) stated below:      Chronic maxillary sinusitis, allergic rhinitis, chronic nasal congestion      Oral allergy medications -insufficient symptom relief, fluticasone(flonase) nasal spray- not tolerated due to side effects of nose bleeds       If patient is unable to use this alternate nasal steroid treatment she will likely continue to have persisting nasal congestion and sinus symptoms.    I have included medical records pertaining to the patient s medical history, current condition and treatment plan.  In addition, the following billing codes will be used for therapy and follow-up: .  Chronic maxillary sinusitis J32.0, Allergic rhinitis J30.9      I firmly believe that this therapy is clinically appropriate and that Rozina Bell would benefit from improved {clinical outcomes, quality of life} if allowed the opportunity to receive this treatment.  Please contact me at Dept: [phone] if you require additional information to ensure the prompt approval for coverage.    Please send your written decision to me at this address:  [facility]  [address]  [facility]  [place] [address]  [phone]  Dept: [phone]        Sincerely,      Selam Lehman MD        Enclosures

## 2019-09-24 NOTE — TELEPHONE ENCOUNTER
Called and spoke with patient. She would like to try PA for medication and if not, then try to switch to another medication.   Created new encounter for PA department.     Cecile Hi RN

## 2019-09-24 NOTE — TELEPHONE ENCOUNTER
Reason for Call:  Other prescription    Detailed comments: Patient states she was just seen by Natalie and the prescription Nasonex, states that it's $200 for just 1 and that does need prior auth, she is is wondering what should she do exactly. Please call to advise.     Phone Number Patient can be reached at: Cell number on file:    Telephone Information:   Mobile [phone]       Best Time: Any     Can we leave a detailed message on this number? YES    Call taken on 9/24/2019 at 12:03 PM by Ana Penn

## 2019-09-26 NOTE — TELEPHONE ENCOUNTER
PRIOR AUTHORIZATION DENIED    Medication: mometasone (NASONEX) 50 MCG/ACT nasal spray    Denial Date: 9/26/2019    Denial Rational:  Patient must have a history of trial & failure to the formulary alternative(s) or have a contraindication or intolerance to the formulary alternatives:      Appeal Information:    If you would like to appeal, please supply P/A team with a letter of medical necessity with clinical reason.

## 2019-09-26 NOTE — TELEPHONE ENCOUNTER
PA Denied would you like to do an Appeal, Change medication or patient pay out of pocket.  Esther Alford,

## 2019-09-26 NOTE — TELEPHONE ENCOUNTER
Central Prior Authorization Team   Phone: [phone]      PA Initiation    Medication: mometasone (NASONEX) 50 MCG/ACT nasal spray  Insurance Company: [employer] - Phone [phone] Fax [phone]  Pharmacy Filling the Rx: [facility] [address] [place], [place] - [address] [facility]  Filling Pharmacy Phone: [phone]  Filling Pharmacy Fax:    Start Date: 9/26/2019

## 2019-09-27 NOTE — TELEPHONE ENCOUNTER
Looks like the appeal letter was started then routed to the PA team before it was finished. Please finish the appeal letter and route back to PA team. Thank you.

## 2019-10-01 NOTE — TELEPHONE ENCOUNTER
MEDICATION APPEAL DENIED    Medication: mometasone (NASONEX) 50 MCG/ACT nasal spray    Denial Date: 10/1/2019    Denial Rational:      Second Level Appeal Information:      Second level appeals will be managed by the clinic staff and provider. Please contact the [employer] Prior Authorization Team if additional information about the denial is needed.

## 2019-10-01 NOTE — TELEPHONE ENCOUNTER
Medication Appeal Initiation    We have initiated an appeal for the requested medication:  Medication: mometasone (NASONEX) 50 MCG/ACT nasal spray  Appeal Start Date:  10/1/2019  Insurance Company: [employer] - Phone [phone] Fax [phone]  Comments:

## 2019-10-15 ENCOUNTER — TRANSFERRED RECORDS (OUTPATIENT)
Dept: HEALTH INFORMATION MANAGEMENT | Facility: CLINIC | Age: 69
End: 2019-10-15

## 2019-10-24 ENCOUNTER — TRANSFERRED RECORDS (OUTPATIENT)
Dept: MULTI SPECIALTY CLINIC | Facility: CLINIC | Age: 69
End: 2019-10-24

## 2019-11-08 ENCOUNTER — OFFICE VISIT (OUTPATIENT)
Dept: INTERNAL MEDICINE | Facility: CLINIC | Age: 69
End: 2019-11-08
Payer: MEDICARE

## 2019-11-08 VITALS
WEIGHT: 248.2 LBS | HEART RATE: 74 BPM | HEIGHT: 63 IN | RESPIRATION RATE: 18 BRPM | OXYGEN SATURATION: 98 % | TEMPERATURE: 97.7 F | BODY MASS INDEX: 43.98 KG/M2 | SYSTOLIC BLOOD PRESSURE: 146 MMHG | DIASTOLIC BLOOD PRESSURE: 86 MMHG

## 2019-11-08 DIAGNOSIS — E78.5 HYPERLIPIDEMIA LDL GOAL <100: ICD-10-CM

## 2019-11-08 DIAGNOSIS — M48.00 SPINAL STENOSIS, UNSPECIFIED SPINAL REGION: ICD-10-CM

## 2019-11-08 DIAGNOSIS — E55.9 HYPOVITAMINOSIS D: ICD-10-CM

## 2019-11-08 DIAGNOSIS — E03.9 HYPOTHYROIDISM, UNSPECIFIED TYPE: ICD-10-CM

## 2019-11-08 DIAGNOSIS — Z12.31 VISIT FOR SCREENING MAMMOGRAM: ICD-10-CM

## 2019-11-08 DIAGNOSIS — E83.42 HYPOMAGNESEMIA: ICD-10-CM

## 2019-11-08 DIAGNOSIS — Z00.00 MEDICARE ANNUAL WELLNESS VISIT, SUBSEQUENT: Primary | ICD-10-CM

## 2019-11-08 DIAGNOSIS — E66.01 MORBID OBESITY (H): ICD-10-CM

## 2019-11-08 DIAGNOSIS — I10 HYPERTENSION GOAL BP (BLOOD PRESSURE) < 140/90: ICD-10-CM

## 2019-11-08 DIAGNOSIS — Z12.11 SCREEN FOR COLON CANCER: ICD-10-CM

## 2019-11-08 DIAGNOSIS — Z80.6 FAMILY HISTORY OF LEUKEMIA: ICD-10-CM

## 2019-11-08 LAB
ANION GAP SERPL CALCULATED.3IONS-SCNC: 4 MMOL/L (ref 3–14)
BUN SERPL-MCNC: 11 MG/DL (ref 7–30)
CALCIUM SERPL-MCNC: 9.2 MG/DL (ref 8.5–10.1)
CHLORIDE SERPL-SCNC: 106 MMOL/L (ref 94–109)
CHOLEST SERPL-MCNC: 181 MG/DL
CO2 SERPL-SCNC: 29 MMOL/L (ref 20–32)
CREAT SERPL-MCNC: 0.62 MG/DL (ref 0.52–1.04)
GFR SERPL CREATININE-BSD FRML MDRD: >90 ML/MIN/{1.73_M2}
GLUCOSE SERPL-MCNC: 90 MG/DL (ref 70–99)
HDLC SERPL-MCNC: 69 MG/DL
LDLC SERPL CALC-MCNC: 102 MG/DL
MAGNESIUM SERPL-MCNC: 2.1 MG/DL (ref 1.6–2.3)
NONHDLC SERPL-MCNC: 112 MG/DL
POTASSIUM SERPL-SCNC: 4.8 MMOL/L (ref 3.4–5.3)
SODIUM SERPL-SCNC: 139 MMOL/L (ref 133–144)
TRIGL SERPL-MCNC: 52 MG/DL
TSH SERPL DL<=0.005 MIU/L-ACNC: 0.65 MU/L (ref 0.4–4)

## 2019-11-08 PROCEDURE — 80061 LIPID PANEL: CPT | Performed by: INTERNAL MEDICINE

## 2019-11-08 PROCEDURE — 83735 ASSAY OF MAGNESIUM: CPT | Performed by: INTERNAL MEDICINE

## 2019-11-08 PROCEDURE — 84443 ASSAY THYROID STIM HORMONE: CPT | Performed by: INTERNAL MEDICINE

## 2019-11-08 PROCEDURE — 36415 COLL VENOUS BLD VENIPUNCTURE: CPT | Performed by: INTERNAL MEDICINE

## 2019-11-08 PROCEDURE — G0439 PPPS, SUBSEQ VISIT: HCPCS | Performed by: INTERNAL MEDICINE

## 2019-11-08 PROCEDURE — 80048 BASIC METABOLIC PNL TOTAL CA: CPT | Performed by: INTERNAL MEDICINE

## 2019-11-08 ASSESSMENT — MIFFLIN-ST. JEOR: SCORE: 1624.96

## 2019-11-08 ASSESSMENT — ACTIVITIES OF DAILY LIVING (ADL): CURRENT_FUNCTION: NO ASSISTANCE NEEDED

## 2019-11-08 NOTE — PROGRESS NOTES
"SUBJECTIVE:   Rozina Bell is a 69 year old female who presents for Preventive Visit.  Are you in the first 12 months of your Medicare coverage?     Healthy Habits:    In general, how would you rate your overall health?  Fair    Frequency of exercise:  None    Duration of exercise:  Other    Do you usually eat at least 4 servings of fruit and vegetables a day, include whole grains    & fiber and avoid regularly eating high fat or \"junk\" foods?  Yes    Taking medications regularly:  Yes    Barriers to taking medications:  Not applicable    Medication side effects:  None    Ability to successfully perform activities of daily living:  No assistance needed    Home Safety:  No safety concerns identified    Hearing Impairment:  No hearing concerns    In the past 6 months, have you been bothered by leaking of urine? Yes (only when pt doesn't go right away when feel the urge )    In general, how would you rate your overall mental or emotional health?  Good      PHQ-2 Total Score:    Additional concerns today:  Yes (If she doen't have to be charged she has a few things to talk with  about. Would also like to discuss weight.)    HPI  - Patient relates that she recently had her mammogram completed at [facility].   - Patient relates that she would like to start managing her weight loss. It is hard for her to move as it is painful for her back, knees, and feet- will be seen next week for balance and strengthing.She feels fatigued often. Patient relates that she prefers veggies over meat. Her dairy intake are typically cottage cheese and 2% milk.   - Patient notes that she sleeps better in a chair than her bed. She notes pain when having to get up from her bed.   - Patient relates that her blood pressure was 113/60s pulses was 65 in the morning.  - Patient notes some itchiness in her ears.    Do you feel safe in your environment? Yes    Have you ever done Advance Care Planning? (For example, a Health Directive, POLST, " or a discussion with a medical provider or your loved ones about your wishes): Yes, patient states has an Advance Care Planning document and will bring a copy to the clinic.      Fall risk  Fallen 2 or more times in the past year?: No  Any fall with injury in the past year?: No    Cognitive Screening   1) Repeat 3 items (Leader, Season, Table)    2) Clock draw: NORMAL  3) 3 item recall: Recalls 3 objects  Results: 3 items recalled: COGNITIVE IMPAIRMENT LESS LIKELY    Mini-CogTM Copyright S Liliane. Licensed by the author for use in [facility]; reprinted with permission (darryl@[email or web address]). All rights reserved.      Do you have sleep apnea, excessive snoring or daytime drowsiness?: no    Reviewed and updated as needed this visit by clinical staff  Tobacco  Allergies  Meds  Problems  Med Hx  Surg Hx  Fam Hx  Soc Hx          Reviewed and updated as needed this visit by Provider  Tobacco  Allergies  Meds  Problems  Med Hx  Surg Hx  Fam Hx        Social History     Tobacco Use     Smoking status: Never Smoker     Smokeless tobacco: Never Used     Tobacco comment: Lives in smoke free household   Substance Use Topics     Alcohol use: No     If you drink alcohol do you typically have >3 drinks per day or >7 drinks per week? No    No flowsheet data found.      Current providers sharing in care for this patient include:   Patient Care Team:  Deisy Sharpe MD as PCP - Jesse Arango MD as MD (Specialist)  Emmanuel Max MD as MD (Orthopedics)  Deisy Sharpe MD as Assigned PCP    The following health maintenance items are reviewed in Epic and correct as of today:  Health Maintenance   Topic Date Due     ZOSTER IMMUNIZATION (2 of 3) 11/18/2015     EYE EXAM  10/08/2019     FALL RISK ASSESSMENT  11/07/2019     MAMMO SCREENING  10/13/2019     MEDICARE ANNUAL WELLNESS VISIT  11/07/2019     TSH W/FREE T4 REFLEX  11/08/2020     FIT-DNA (Cologuard)  11/16/2020      DTAP/TDAP/TD IMMUNIZATION (4 - Td) 08/08/2022     ADVANCE CARE PLANNING  11/08/2024     DEXA  Completed     HEPATITIS C SCREENING  Completed     PHQ-2  Completed     INFLUENZA VACCINE  Completed     PNEUMOCOCCAL IMMUNIZATION 65+ LOW/MEDIUM RISK  Completed     IPV IMMUNIZATION  Aged Out     MENINGITIS IMMUNIZATION  Aged Out     Lab work is in process  Labs reviewed in EPIC  Patient Active Problem List   Diagnosis     Hypothyroidism     Allergic state     Spinal stenosis     CARDIOVASCULAR SCREENING; LDL GOAL LESS THAN 160     Fibromyalgia     Obesity     AK (actinic keratosis)     Hypertension goal BP (blood pressure) < 140/90     Skin cancer     Advanced directives, counseling/discussion     Degenerative spondylolisthesis     Metatarsal fracture     Prepatellar bursitis     MMT (medial meniscus tear)     Lumbar spinal stenosis     Hypovitaminosis D     Primary osteoarthritis of both first carpometacarpal joints     Morbid obesity (H)     Hip pain, left     Family history of leukemia     Primary osteoarthritis of both ankles     Hypomagnesemia     Past Surgical History:   Procedure Laterality Date     BREAST BIOPSY, RT/LT  8/08    Breat Biopsy RT/LT     C VAG HYST,RMV TUBE/OVARY  age 34    endometriosis     HC REMOVAL GALLBLADDER  age 31     HYSTERECTOMY, PAP NO LONGER INDICATED         Social History     Tobacco Use     Smoking status: Never Smoker     Smokeless tobacco: Never Used     Tobacco comment: Lives in smoke free household   Substance Use Topics     Alcohol use: No     Family History   Problem Relation Age of Onset     Heart Disease Mother      Arthritis Mother      Cardiovascular Mother      Thyroid Disease Mother      Hypertension Mother      Cerebrovascular Disease Mother      Gastrointestinal Disease Father      Cancer Father      Respiratory Father      Heart Disease Maternal Grandfather      Diabetes Paternal Grandfather      Cancer Brother      Diabetes Brother      Heart Disease Sister       Neurologic Disorder Sister         EPILEPSY     Allergies Son      Heart Disease Brother         TRIPLE BYPASS     Lipids Brother      Hypertension Brother      Chronic Obstructive Pulmonary Disease Brother      Glaucoma No family hx of      Macular Degeneration No family hx of          Current Outpatient Medications   Medication Sig Dispense Refill     Ascorbic Acid (VITAMIN C PO) Take 500 mg by mouth daily       CALCIUM + D 600-200 MG-UNIT OR TABS 1 TABLET TWICE  DAILY       Cholecalciferol (VITAMIN D) 1000 UNITS capsule Take 2,000 Units by mouth daily 180 capsule 4     GLUCOSAMINE CHONDROITIN OR TABS 1 TABLET DAILY       levothyroxine (SYNTHROID/LEVOTHROID) 50 MCG tablet Take 1 tablet (50 mcg) by mouth daily Patient would like to see the price difference between synthroid and levothyroxine 90 tablet 3     magnesium oxide 400 MG CAPS Take 1 tablet by mouth 3 times daily 270 capsule 4     meloxicam (MOBIC) 15 MG tablet TAKE 1 TABLET BY MOUTH EVERY DAY. 90 tablet 3     MULTI-VITAMIN OR None Entered       order for DME Equipment being ordered: [id number] Thumb Splint, CMC RT, MD    [id number] Thumb splint, CMC, LT MD 1 Device 0     ORDER FOR DME Equipment being ordered: compression stocking joey 1 Device 0     SYNTHROID 50 MCG tablet Take 1 tablet (50 mcg) by mouth daily 90 tablet 3     VITAMIN E OR 1 TABLET DAILY       fluticasone (FLONASE) 50 MCG/ACT spray Spray 2 sprays into both nostrils daily (Patient not taking: Reported on 11/8/2019) 16 g 3     hydrOXYzine (ATARAX) 25 MG tablet Take 1 tablet (25 mg) by mouth 2 times daily as needed for itching (Patient not taking: Reported on 11/8/2019) 60 tablet 3     mometasone (NASONEX) 50 MCG/ACT nasal spray Spray 2 sprays into both nostrils every other day (Patient not taking: Reported on 11/8/2019) 17 g 3     Allergies   Allergen Reactions     Amitriptyline      jittthomas     Celebrex [Celecoxib]      Epinephrine-Lidocaine-Na Metabisulfite Other (See Comments)      "Palpitations, flushing     Latex      Morphine      Penicillins      Sulfa Drugs      Ultram [Tramadol Hcl]      Yellow Dye      Can't remember, but ends up in ER with reaction     Zithromax [Azithromycin]        Review of Systems  Constitutional, HEENT, cardiovascular, pulmonary, GI, , musculoskeletal, neuro, skin, endocrine and psych systems are negative, except as otherwise noted.    This document serves as a record of the services and decisions personally performed and made by Deisy Sharpe MD. It was created on her behalf by Emma Munguia, a trained medical scribe. The creation of this document is based on the provider's statements to the medical scribe.  Emma Munguia 9:27 AM November 8, 2019    OBJECTIVE:   BP (!) 146/86 (BP Location: Left arm)   Pulse 74   Temp 97.7  F (36.5  C) (Oral)   Resp 18   Ht 1.6 m (5' 3\")   Wt 112.6 kg (248 lb 3.2 oz)   SpO2 98%   BMI 43.97 kg/m   Estimated body mass index is 43.97 kg/m  as calculated from the following:    Height as of this encounter: 1.6 m (5' 3\").    Weight as of this encounter: 112.6 kg (248 lb 3.2 oz).  Physical Exam  GENERAL APPEARANCE: healthy, alert and no distress  EYES: Eyes grossly normal to inspection, PERRL and conjunctivae and sclerae normal  HENT: ear canals and TM's normal, nose and mouth without ulcers or lesions, oropharynx clear and oral mucous membranes moist  NECK: no adenopathy, no asymmetry, masses, or scars and thyroid normal to palpation  RESP: lungs clear to auscultation - no rales, rhonchi or wheezes  BREAST: normal without masses, tenderness or nipple discharge and no palpable axillary masses or adenopathy  CV: regular rate and rhythm, normal S1 S2, no S3 or S4, no murmur, click or rub, no peripheral edema and peripheral pulses strong  MS: no musculoskeletal defects are noted and gait is age appropriate without ataxia  SKIN: no suspicious lesions or rashes  NEURO: Normal strength and tone, sensory exam grossly normal, mentation intact and " speech normal  PSYCH: mentation appears normal and affect normal/bright  No abdominal exam      Diagnostic Test Results:  Labs reviewed in Epic  Results for orders placed or performed in visit on 11/08/19 (from the past 24 hour(s))   Basic metabolic panel   Result Value Ref Range    Sodium 139 133 - 144 mmol/L    Potassium 4.8 3.4 - 5.3 mmol/L    Chloride 106 94 - 109 mmol/L    Carbon Dioxide 29 20 - 32 mmol/L    Anion Gap 4 3 - 14 mmol/L    Glucose 90 70 - 99 mg/dL    Urea Nitrogen 11 7 - 30 mg/dL    Creatinine 0.62 0.52 - 1.04 mg/dL    GFR Estimate >90 >60 mL/min/[1.73_m2]    GFR Estimate If Black >90 >60 mL/min/[1.73_m2]    Calcium 9.2 8.5 - 10.1 mg/dL   Magnesium   Result Value Ref Range    Magnesium 2.1 1.6 - 2.3 mg/dL   TSH with free T4 reflex   Result Value Ref Range    TSH 0.65 0.40 - 4.00 mU/L   Lipid panel reflex to direct LDL Fasting   Result Value Ref Range    Cholesterol 181 <200 mg/dL    Triglycerides 52 <150 mg/dL    HDL Cholesterol 69 >49 mg/dL    LDL Cholesterol Calculated 102 (H) <100 mg/dL    Non HDL Cholesterol 112 <130 mg/dL       ASSESSMENT / PLAN:   1. Medicare annual wellness visit, subsequent    2. Spinal stenosis, unspecified spinal region  Follows with ortho spine.    3. Hypertension goal BP (blood pressure) < 140/90  Elevated today. 146/86.  Patient relates that it was in the 110s this morning.  BMP and magnesium checked today.  - Basic metabolic panel  - Magnesium    4. Hypovitaminosis D  Well controlled with medications without side effects.    5. Family history of leukemia  Labs checked periodically.    6. Hypomagnesemia  Magnesium checked today.  - Magnesium    7. Visit for screening mammogram  2D mammogram was completed recently.    8. Screen for colon cancer  FIT card ordered.  - Fecal colorectal cancer screen FIT; Future    9. Hypothyroidism, unspecified type  Doing well with medication. No plan.  TSH checked today.  - TSH with free T4 reflex    10. Hyperlipidemia LDL goal  "<100  Lipid panel checked today.  - Lipid panel reflex to direct LDL Fasting    Morbid obesity - discussed with patient exercise and diet changes       Patient Instructions   - Try logging in your food: calories, fats, and proteins. [employer] is an silva that you could use to log food.  - Try using mineral oils in your ears for the itchiness.  - Consider shingles vaccine. Take the slip down to Pharmacy for the cost.      COUNSELING:  Reviewed preventive health counseling, as reflected in patient instructions       Regular exercise       Healthy diet/nutrition       Vision screening       Hearing screening       Immunizations    Will consider shingle vaccine.         Advanced Planning     Estimated body mass index is 43.97 kg/m  as calculated from the following:    Height as of this encounter: 1.6 m (5' 3\").    Weight as of this encounter: 112.6 kg (248 lb 3.2 oz).    Weight management plan: Discussed healthy diet and exercise guidelines     reports that she has never smoked. She has never used smokeless tobacco.      Appropriate preventive services were discussed with this patient, including applicable screening as appropriate for cardiovascular disease, diabetes, osteopenia/osteoporosis, and glaucoma.  As appropriate for age/gender, discussed screening for colorectal cancer, prostate cancer, breast cancer, and cervical cancer. Checklist reviewing preventive services available has been given to the patient.    Reviewed patients plan of care and provided an AVS. The Basic Care Plan (routine screening as documented in Health Maintenance) for Rozina meets the Care Plan requirement. This Care Plan has been established and reviewed with the Patient.    Counseling Resources:  ATP IV Guidelines  Pooled Cohorts Equation Calculator  Breast Cancer Risk Calculator  FRAX Risk Assessment  ICSI Preventive Guidelines  Dietary Guidelines for Americans, 2010  USDA's MyPlate  ASA Prophylaxis  Lung CA Screening    The information in " this document, created by the medical scribe for me, accurately reflects the services I personally performed and the decisions made by me. I have reviewed and approved this document for accuracy prior to leaving the patient care area.  November 8, 2019 9:27 AM    Deisy Sharpe MD  [facility]    Identified Health Risks:

## 2019-11-08 NOTE — Clinical Note
Rozina says she did the cologuard last year or the year before.  However we only have FIT in the system as being completed.  Please call cologuard and confirm or deny.  Thanks.

## 2019-11-08 NOTE — LETTER
[address] [place] [address]    November 11, 2019    Rozina Bell   [address] [id number]  [place] [facility] [place] [facility] [id number]          Dear Rozina,    Normal thyroid. Good cholesterol. Normal electrolytes. Normal kidney function. Normal blood sugar. Normal magnesium    Enclosed is a copy of your results.     Results for orders placed or performed in visit on 11/08/19   Basic metabolic panel     Status: None   Result Value Ref Range    Sodium 139 133 - 144 mmol/L    Potassium 4.8 3.4 - 5.3 mmol/L    Chloride 106 94 - 109 mmol/L    Carbon Dioxide 29 20 - 32 mmol/L    Anion Gap 4 3 - 14 mmol/L    Glucose 90 70 - 99 mg/dL    Urea Nitrogen 11 7 - 30 mg/dL    Creatinine 0.62 0.52 - 1.04 mg/dL    GFR Estimate >90 >60 mL/min/[1.73_m2]    GFR Estimate If Black >90 >60 mL/min/[1.73_m2]    Calcium 9.2 8.5 - 10.1 mg/dL   Magnesium     Status: None   Result Value Ref Range    Magnesium 2.1 1.6 - 2.3 mg/dL   TSH with free T4 reflex     Status: None   Result Value Ref Range    TSH 0.65 0.40 - 4.00 mU/L   Lipid panel reflex to direct LDL Fasting     Status: Abnormal   Result Value Ref Range    Cholesterol 181 <200 mg/dL    Triglycerides 52 <150 mg/dL    HDL Cholesterol 69 >49 mg/dL    LDL Cholesterol Calculated 102 (H) <100 mg/dL    Non HDL Cholesterol 112 <130 mg/dL       If you have any questions or concerns, please call myself or my nurse at [phone].      Sincerely,        Deisy Sharpe MD/fahad

## 2019-11-08 NOTE — Clinical Note
Please abstract the following data from this visit with this patient into the appropriate field in Epic:Tests that can be patient reported without a hard copy:Mammogram done on this date: 10/25/2019 (approximately), by this group: [facility], results were found in care everywhere. Other Tests found in the patient's chart through Chart Review/Care Everywhere:Note to Abstraction: If this section is blank, no results were found via Chart Review/Care Everywhere.

## 2019-11-09 NOTE — RESULT ENCOUNTER NOTE
Normal thyroid. Good cholesterol. Normal electrolytes. Normal kidney function. Normal blood sugar. Normal magnesium.

## 2019-11-11 ENCOUNTER — TRANSFERRED RECORDS (OUTPATIENT)
Dept: HEALTH INFORMATION MANAGEMENT | Facility: CLINIC | Age: 69
End: 2019-11-11

## 2019-11-14 ENCOUNTER — TELEPHONE (OUTPATIENT)
Dept: INTERNAL MEDICINE | Facility: CLINIC | Age: 69
End: 2019-11-14

## 2019-11-14 NOTE — TELEPHONE ENCOUNTER
Spoke to patient and inform her of message below  Mellissa Munguia CMA on 11/14/2019 at 11:05 AM

## 2019-11-14 NOTE — TELEPHONE ENCOUNTER
Call patient     Clayton was in 11/2017 so the next is due in 11/2020.  She doesn't have to return her FIT and ok to cancel FIT order.

## 2019-11-20 ENCOUNTER — TELEPHONE (OUTPATIENT)
Dept: INTERNAL MEDICINE | Facility: CLINIC | Age: 69
End: 2019-11-20

## 2019-11-20 NOTE — TELEPHONE ENCOUNTER
Panel Management Review      Patient has the following on her problem list:     Hypertension   Last three blood pressure readings:  BP Readings from Last 3 Encounters:   11/08/19 (!) 146/86   09/24/19 136/82   08/14/19 (!) 149/80     Blood pressure: MONITOR    HTN Guidelines:  Less than 140/90      Composite cancer screening  Chart review shows that this patient is due/due soon for the following None  Summary:    Patient is due/failing the following:   BP CHECK    Action needed:   Routed to provider for review.    Type of outreach:    None, routed to provider for review.    Questions for provider review:    Patient is on quality fail list for hypertension she was seen on 11/8/2019 for a physical with a BP of 146/86 and was told to follow up in a year is this okay or should patient schedule an appointment?                                                                                                                                      LS     Chart routed to Provider .

## 2020-01-06 DIAGNOSIS — M48.00 SPINAL STENOSIS, UNSPECIFIED SPINAL REGION: Primary | ICD-10-CM

## 2020-01-07 RX ORDER — MELOXICAM 15 MG/1
TABLET ORAL
Qty: 90 TABLET | Refills: 1 | Status: SHIPPED | OUTPATIENT
Start: 2020-01-07 | End: 2020-07-13

## 2020-01-07 NOTE — TELEPHONE ENCOUNTER
"Routing refill request to provider for review/approval because:  Failed [facility] refill protocol, see below:    Requested Prescriptions   Pending Prescriptions Disp Refills     meloxicam (MOBIC) 15 MG tablet [Pharmacy Med Name: Meloxicam 15 MG Oral Tablet]  Last Written Prescription Date:  11/7/18  Last Fill Quantity: 90,  # refills: 3   Last office visit: 11/8/2019 with prescribing provider:     Future Office Visit:    0     Sig: TAKE 1 TABLET BY MOUTH ONCE DAILY       NSAID Medications Failed - 1/7/2020  7:39 AM        Failed - Blood pressure under 140/90 in past 12 months     BP Readings from Last 3 Encounters:   11/08/19 (!) 146/86   09/24/19 136/82   08/14/19 (!) 149/80                 Failed - Normal ALT on file in past 12 months     Recent Labs   Lab Test 08/06/14  0829   ALT 35             Failed - Normal AST on file in past 12 months     Recent Labs   Lab Test 08/06/14  0829   AST 28             Failed - Patient is age 6-64 years        Failed - Normal CBC on file in past 12 months     Recent Labs   Lab Test 11/07/18  1424   WBC 5.1   RBC 4.31   HGB 13.5   HCT 40.1                    Passed - Recent (12 mo) or future (30 days) visit within the authorizing provider's specialty     Patient has had an office visit with the authorizing provider or a provider within the authorizing providers department within the previous 12 mos or has a future within next 30 days. See \"Patient Info\" tab in inbasket, or \"Choose Columns\" in Meds & Orders section of the refill encounter.              Passed - Medication is active on med list        Passed - No active pregnancy on record        Passed - Normal serum creatinine on file in past 12 months     Recent Labs   Lab Test 11/08/19  1017   CR 0.62             Passed - No positive pregnancy test in past 12 months        Mariana Nunez RN - BC    "

## 2020-01-09 DIAGNOSIS — E03.9 HYPOTHYROIDISM, UNSPECIFIED TYPE: ICD-10-CM

## 2020-01-09 RX ORDER — LEVOTHYROXINE SODIUM 50 UG/1
50 TABLET ORAL DAILY
Qty: 90 TABLET | Refills: 2 | Status: SHIPPED | OUTPATIENT
Start: 2020-01-09 | End: 2020-10-12

## 2020-01-09 NOTE — TELEPHONE ENCOUNTER
Reason for call:  Medication     If this is a refill request, has the caller requested the refill from the pharmacy already? Yes     Will the patient be using a [facility]? No     Name of the pharmacy and phone number for the current request: [facility] [phone]    Name of the medication requested: levothyroxine (SYNTHROID/LEVOTHROID) 50 MCG tablet    Other request: Please call patient back when sent     Phone number to reach patient:  Home number on file [phone] (home)    Best Time:  any    Can we leave a detailed message on this number?  YES

## 2020-01-09 NOTE — TELEPHONE ENCOUNTER
"Pending Prescriptions:                       Disp   Refills    levothyroxine (SYNTHROID/LEVOTHROID) 50 M*90 tab*3            Sig: Take 1 tablet (50 mcg) by mouth daily Patient           would like to see the price difference between           synthroid and levothyroxine    RN refilled medication per [facility] [place].  Patient notified.    Nita Cuellar RN    Requested Prescriptions   Pending Prescriptions Disp Refills     levothyroxine (SYNTHROID/LEVOTHROID) 50 MCG tablet 90 tablet 3     Sig: Take 1 tablet (50 mcg) by mouth daily Patient would like to see the price difference between synthroid and levothyroxine       Thyroid Protocol Passed - 1/9/2020  3:35 PM        Passed - Patient is 12 years or older        Passed - Recent (12 mo) or future (30 days) visit within the authorizing provider's specialty     Patient has had an office visit with the authorizing provider or a provider within the authorizing providers department within the previous 12 mos or has a future within next 30 days. See \"Patient Info\" tab in inbasket, or \"Choose Columns\" in Meds & Orders section of the refill encounter.              Passed - Medication is active on med list        Passed - Normal TSH on file in past 12 months     Recent Labs   Lab Test 11/08/19  1017   TSH 0.65              Passed - No active pregnancy on record     If patient is pregnant or has had a positive pregnancy test, please check TSH.          Passed - No positive pregnancy test in past 12 months     If patient is pregnant or has had a positive pregnancy test, please check TSH.            "

## 2020-02-24 ENCOUNTER — TELEPHONE (OUTPATIENT)
Dept: INTERNAL MEDICINE | Facility: CLINIC | Age: 70
End: 2020-02-24

## 2020-02-25 NOTE — TELEPHONE ENCOUNTER
Panel Management Review      Patient has the following on her problem list:     Hypertension   Last three blood pressure readings:  BP Readings from Last 3 Encounters:   11/08/19 (!) 146/86   09/24/19 136/82   08/14/19 (!) 149/80     Blood pressure: MONITOR    HTN Guidelines:  Less than 140/90      Composite cancer screening  Chart review shows that this patient is due/due soon for the following None  Summary:    Patient is due/failing the following:   BP CHECK    Action needed:   Routed to provider for review.    Type of outreach:    None, routed to provider for review.    Questions for provider review:    Patient was seen 11/8/2019 for a physical and BP was elevated at 146/86 patient told to follow up in one year for next physical is this okay for should patient be seen for BP as she is on quality fail list for hypertension                                                                                                                                      LS     Chart routed to Provider .

## 2020-07-09 ENCOUNTER — TELEPHONE (OUTPATIENT)
Dept: PALLIATIVE MEDICINE | Facility: CLINIC | Age: 70
End: 2020-07-09

## 2020-07-09 NOTE — TELEPHONE ENCOUNTER
Order received from Scot Mcpherson PA-C at [facility] for Bilateral L4 Transforaminal GRACIE. Order scanned to telephone encounter, routing to scheduling coordinators to contact patient.     Norma GOMEZ    [facility]

## 2020-07-10 NOTE — TELEPHONE ENCOUNTER
Pt lvm to schedule.    LM on vm to schedule Bilateral L4 Transforaminal GRACIE      Rossy GILLIAM    [facility] [place]

## 2020-07-13 DIAGNOSIS — M48.00 SPINAL STENOSIS, UNSPECIFIED SPINAL REGION: ICD-10-CM

## 2020-07-13 RX ORDER — MELOXICAM 15 MG/1
TABLET ORAL
Qty: 90 TABLET | Refills: 0 | Status: SHIPPED | OUTPATIENT
Start: 2020-07-13 | End: 2020-10-12

## 2020-07-13 NOTE — TELEPHONE ENCOUNTER
"Routing refill request to provider for review/approval because:  Labs not current:  See protocol  BP    Requested Prescriptions   Pending Prescriptions Disp Refills     meloxicam (MOBIC) 15 MG tablet [Pharmacy Med Name: Meloxicam 15 MG Oral Tablet] 90 tablet 0     Sig: Take 1 tablet by mouth once daily       NSAID Medications Failed - 7/13/2020  1:35 PM        Failed - Blood pressure under 140/90 in past 12 months     BP Readings from Last 3 Encounters:   11/08/19 (!) 146/86   09/24/19 136/82   08/14/19 (!) 149/80                 Failed - Normal ALT on file in past 12 months     Recent Labs   Lab Test 08/06/14  0829   ALT 35             Failed - Normal AST on file in past 12 months     Recent Labs   Lab Test 08/06/14  0829   AST 28             Failed - Patient is age 6-64 years        Failed - Normal CBC on file in past 12 months     Recent Labs   Lab Test 11/07/18  1424   WBC 5.1   RBC 4.31   HGB 13.5   HCT 40.1                    Passed - Recent (12 mo) or future (30 days) visit within the authorizing provider's specialty     Patient has had an office visit with the authorizing provider or a provider within the authorizing providers department within the previous 12 mos or has a future within next 30 days. See \"Patient Info\" tab in inbasket, or \"Choose Columns\" in Meds & Orders section of the refill encounter.              Passed - Medication is active on med list        Passed - No active pregnancy on record        Passed - Normal serum creatinine on file in past 12 months     Recent Labs   Lab Test 11/08/19  1017   CR 0.62       Ok to refill medication if creatinine is low          Passed - No positive pregnancy test in past 12 months           "

## 2020-07-13 NOTE — TELEPHONE ENCOUNTER
"Screening Questions for Radiology Injections:    Injection to be done at which interventional clinic site? [facility] [place]    Instruct patient to arrive as directed prior to the scheduled appointment time:    [place] minutes before      [place]: 30 minutes before; if IV needed 1 hour before     Dr. Fortune-no IV needed for Cervical GRACIE; please instruct to arrive 30\" early    Procedure ordered by     Procedure ordered? Bilateral L4 Transforaminal GRACIE    As a reminder, receiving steroids can decrease your body's ability to fight infection.   Would you still like to move forward with scheduling the injection?  Yes      Transforaminal Cervical GRACIE - no pain provider currently performing    What insurance would patient like us to bill for this procedure? [employer]       Worker's comp or MVA (motor vehicle accident) -Any injection DO NOT SCHEDULE and route to Lucina Giancarlo.      [employer] insurance - For SI joint injections, DO NOT SCHEDULE and route Lucina Giancarlo.       [employer] - Any injection besides hip/shoulder/knee joint DO NOT SCHEDULE and route to Lucina Giancarlo. She will obtain PA and call pt back to schedule procedure or notify pt of denial.       [employer]-Route to [place] for review      **BCBS- ALL need to be routed to [place] for review if a PA is needed**      IF SCHEDULING IN [place] AND NEEDS A PA, IT IS OKAY TO SCHEDULE. [place] HANDLES THEIR OWN PA'S AFTER THE PATIENT IS SCHEDULED. PLEASE SCHEDULE AT LEAST 1 WEEK OUT SO A PA CAN BE OBTAINED.    Any chance of pregnancy? NO   If YES, do NOT schedule and route to RN pool    Is an  needed? No     Patient has a drive home? (mandatory) YES: informed     Is patient taking any blood thinners (i.e. plavix, coumadin, jantoven, warfarin, heparin, pradaxa or dabigatran, etc)? No   If hold needed, do NOT schedule, route to RN pool     Is patient taking any aspirin products (includes Excedrin and Fiorinal)? No     If more " than 325mg/day, OK to schedule; Instruct pt to decrease to less than 325 mg for 7 days AND route to RN pool    For CERVICAL procedures, hold all aspirin products for 6 days.     Tell pt that if aspirin product is not held for 6 days, the procedure WILL BE cancelled.      Does the patient have a bleeding or clotting disorder? No     If YES, okay to schedule AND route to RN nurse pool    For any patients with platelet count <100, must be forwarded to provider    Is patient diabetic?  No  If YES, instruct them to bring their glucometer.    Does patient have an active infection or treated for one within the past week? No     Is patient currently taking any antibiotics?  No     For patients on chronic, preventative, or prophylactic antibiotics, procedures may be scheduled.     For patients on antibiotics for active or recent infection:antibiotic course must have been completed for 4 days    Is patient currently taking any steroid medications? (i.e. Prednisone, Medrol)  No     For patients on steroid medications, course must have been completed for 4 days    Is patient actively being treated for cancer or immunocompromised? No  If YES, do NOT schedule and route to RN pool     Are you able to get on and off an exam table with minimal or no assistance? Yes  If NO, do NOT schedule and route to RN pool    Are you able to roll over and lay on your stomach with minimal or no assistance? Yes  If NO, do NOT schedule and route to RN pool     Any allergies to contrast dye, iodine, shellfish, or numbing and steroid medications? No  If YES, route to RN pool AND add allergy information to appointment notes    Allergies: Amitriptyline; Celebrex [celecoxib]; Epinephrine-lidocaine-na metabisulfite; Latex; Morphine; Penicillins; Sulfa drugs; Ultram [tramadol hcl]; Yellow dye; and Zithromax [azithromycin]      Has the patient had a flu shot or any other vaccinations within 7 days before or after the procedure.  No     Does patient have an  MRI/CT?  YES: 2017  Check Procedure Scheduling Grid to see if required.      Was the MRI done within the last 3 years?  Yes    If yes, where was the MRI done i.e.[facility], [facility], [facility], [facility] etc?     If no, do not schedule and route to RN pool    If MRI was not done at [place], [facility] or [facility] do NOT schedule and route to RN pool.      If pt has an imaging disc, the injection MAY be scheduled but pt has to bring disc to appt.     If they show up without the disc the injection cannot be done    Procedure Specific Instructions:      If celiac plexus block, informed patient NPO for 6 hours and that it is okay to take medications with sips of water, especially blood pressure medications  Not Applicable         If this is for a cervical procedure, informed patient that aspirin needs to be held for 6 days.   Not Applicable      If IV needed:    Do not schedule procedures requiring IV placement in the first appointment of the day or first appointment after lunch. Do NOT schedule at 0745, 0815 or 1245.     Instructed pt to arrive 30 minutes early for IV start if required. (Check Procedure Scheduling Grid)  Not Applicable    Reminders:      If you are started on any steroids or antibiotics between now and your appointment, you must contact us because the procedure may need to be cancelled.  No      For all procedures except radiofrequency ablations (RFAs) and spinal cord stimulator (SCS) trials, informed patient:    IV sedation is not provided for this procedure.  If you feel that an oral anti-anxiety medication is needed, you can discuss this further with your referring provider or primary care provider.  The Pain Clinic provider will discuss specifics of what the procedure includes at your appointment.  Most procedures last 10-20 minutes.  We use numbing medications to help with any discomfort during the procedure.  Not Applicable      For patients 85 or older we recommend having an adult  stay w/ them for the remainder of the day.       Does the patient have any questions?  NO  Pita Hansen  [facility] [place]

## 2020-08-03 ENCOUNTER — RADIOLOGY INJECTION OFFICE VISIT (OUTPATIENT)
Dept: PALLIATIVE MEDICINE | Facility: CLINIC | Age: 70
End: 2020-08-03
Payer: MEDICARE

## 2020-08-03 ENCOUNTER — ANCILLARY PROCEDURE (OUTPATIENT)
Dept: RADIOLOGY | Facility: CLINIC | Age: 70
End: 2020-08-03
Attending: PSYCHIATRY & NEUROLOGY
Payer: MEDICARE

## 2020-08-03 VITALS
OXYGEN SATURATION: 98 % | HEART RATE: 69 BPM | RESPIRATION RATE: 16 BRPM | DIASTOLIC BLOOD PRESSURE: 80 MMHG | SYSTOLIC BLOOD PRESSURE: 165 MMHG

## 2020-08-03 DIAGNOSIS — M54.16 LUMBAR RADICULOPATHY: Primary | ICD-10-CM

## 2020-08-03 DIAGNOSIS — M54.16 LUMBAR RADICULOPATHY: ICD-10-CM

## 2020-08-03 PROCEDURE — 64483 NJX AA&/STRD TFRM EPI L/S 1: CPT | Mod: 50 | Performed by: PSYCHIATRY & NEUROLOGY

## 2020-08-03 ASSESSMENT — PAIN SCALES - GENERAL: PAINLEVEL: MODERATE PAIN (5)

## 2020-08-03 NOTE — PROGRESS NOTES
Pre procedure Diagnosis: lumbar radiculopathy   Post procedure Diagnosis: Same  Procedure performed: bilateral L4/5 transforaminal epidural steroid injection   Anesthesia: none  Complications: none  Operators: Madison Yap MD     Indications:   Rozina Bell is a 70 year old female was sent by JAK Gutierrez for epidural steroid injection.  They have a history of bilateral low back pain, worse on the right, going into the lateral leg to the foot.  Exam shows wide based gait, positive seated straight leg and they have tried conservative treatment including medication.    MRI was done at [facility] on 10/15/19 which showed       Options/alternatives, benefits and risks were discussed with the patient including bleeding, infection, tissue trauma, numbness, weakness, paralysis, spinal cord injury, radiation exposure, headache and reaction to medications. Questions were answered to her satisfaction and she agrees to proceed. Voluntary informed consent was obtained and signed.     Vitals were reviewed: Yes  Allergies were reviewed:  Yes   Medications were reviewed:  Yes   Pre-procedure pain score: 5/10    Procedure:  After getting informed consent, patient was brought into the procedure suite and was placed in a prone position on the procedure table.   A Pause for the Cause was performed.  Patient was prepped and draped in sterile fashion.     After identifying the bilateral L4/5 neuroforamen, the C-arm was rotated to a right, then left  lateral oblique angle.  A total of 8ml of Lidocaine 1% was used to anesthetize the skin and the needle track at a skin entry site coaxial with the fluoroscopy beam, and overriding the superior aspect of the neuroforamen.  A 22 gauge 7 inch spinal needle was advanced under intermittent fluoroscopy until it entered the foramen superiorly.    The position was then inspected from anteroposterior and lateral views, and the needle adjusted appropriately.  A total of 4.5ml of Omnipaque-300 was  injected, confirming appropriate position, with spread into the nerve root sheath and the epidural space, with no intravascular uptake. 5.5ml was wasted    1.5ml of 0.5% bupivacaine with 10mg of dexamethasone was injected, divided equally between the two sides.  The needle was flushed with lidocaine and removed.    During the procedure, there was a paresthesia. Associated with pressure only  Hemostasis was achieved, the area was cleaned, and bandaids were placed when appropriate.  The patient tolerated the procedure well, and was taken to the recovery room.    Images were saved to PACS.    Post-procedure pain score: 7/10  Follow-up includes:   -f/u phone call in one week  -f/u with referring provider    Madison Yap MD  [facility]

## 2020-08-03 NOTE — NURSING NOTE
Discharge Information    IV Discontiued Time:  NA    Amount of Fluid Infused:  NA    Discharge Criteria = When patient returns to baseline or as per MD order    Consciousness:  Pt is fully awake    Circulation:  BP +/- 20% of pre-procedure level    Respiration:  Patient is able to breathe deeply    O2 Sat:  Patient is able to maintain O2 Sat >92% on room air    Activity:  Moves 4 extremities on command    Ambulation:  Patient is able to stand and walk or stand and pivot into wheelchair    Dressing:  Clean/dry or No Dressing    Notes:   Discharge instructions and AVS given to patient    Patient meets criteria for discharge?  YES    Admitted to PCU?  No    Responsible adult present to accompany patient home?  Yes    Signature/Title:    Nico Conklin RN  RN Care Coordinator  [facility] [place]

## 2020-08-03 NOTE — NURSING NOTE
Pre-procedure Intake    Have you been fasting? NA    If yes, for how long? No     Are you taking a prescribed blood thinner such as coumadin, Plavix, Xarelto?    No    If yes, when did you take your last dose? No     Do you take aspirin?  No    If cervical procedure, have you held aspirin for 6 days?   No     Do you have any allergies to contrast dye, iodine, steroid and/or numbing medications?  NO    Are you currently taking antibiotics or have an active infection?  NO    Have you had a fever/elevated temperature within the past week? NO    Are you currently taking oral steroids? NO    Do you have a ? Yes       Are you pregnant or breastfeeding?  NO    Are the vital signs normal?  No    Valdemar Gonzales MA

## 2020-08-03 NOTE — PATIENT INSTRUCTIONS
[facility]   Procedure Discharge Instructions    Today you saw: Dr. Pam Yap      You had an: Lumbar Epidural steroid injection       Medications used:  Lidocaine   Bupivacaine   Dexamethasone Omnipaque                If you were holding your blood thinning medication, please restart taking it: N/A    Be cautious when walking. Numbness and/or weakness in the lower extremities may occur for up to 6-8 hours after the procedure due to effect of the local anesthetic    Do not drive for 6 hours. The effect of the local anesthetic could slow your reflexes.     You may resume your regular activities after 24 hours    Avoid strenuous activity for the first 24 hours    You may shower, however avoid swimming, tub baths or hot tubs for 24 hours following your procedure    You may have a mild to moderate increase in pain for several days following the injection.    It may take up to 14 days for the steroid medication to start working although you may feel the effect as early as a few days after the procedure.       You may use ice packs for 10-15 minutes, 3 to 4 times a day at the injection site for comfort    Do not use heat to painful areas for 6 to 8 hours. This will give the local anesthetic time to wear off and prevent you from accidentally burning your skin.     Unless you have been directed to avoid the use of anti-inflammatory medications (NSAIDS), you may use medications such as ibuprofen, Aleve or Tylenol for pain control if needed.     Possible side effects of steroids that you may experience include flushing, elevated blood pressure, increased appetite, mild headaches and restlessness.  All of these symptoms will get better with time.    If you experience any of the following, call the Pain Clinic during work hours (Mon-Friday 8-4:30 pm) at [phone] or the Provider Line after hours at [phone]:  -Fever over 100 degree F  -Swelling, bleeding, redness, drainage, warmth at  the injection site  -Progressive weakness or numbness in your legs   -Loss of bowel or bladder function  -Unusual new onset of pain that is not improving

## 2020-08-10 ENCOUNTER — TELEPHONE (OUTPATIENT)
Dept: RADIOLOGY | Facility: CLINIC | Age: 70
End: 2020-08-10

## 2020-08-10 NOTE — TELEPHONE ENCOUNTER
Patient had a bilateral L4/5 transforaminal epidural steroid injection on 8/3.  Called patient for an update.      Left message that we were calling for an update about how s/he was doing after the injection.  LM that if s/he has any problems or questions to call the clinic at [phone].

## 2020-08-18 ENCOUNTER — TRANSFERRED RECORDS (OUTPATIENT)
Dept: HEALTH INFORMATION MANAGEMENT | Facility: CLINIC | Age: 70
End: 2020-08-18

## 2020-10-10 DIAGNOSIS — E03.9 HYPOTHYROIDISM, UNSPECIFIED TYPE: ICD-10-CM

## 2020-10-10 DIAGNOSIS — M48.00 SPINAL STENOSIS, UNSPECIFIED SPINAL REGION: ICD-10-CM

## 2020-10-12 ENCOUNTER — ALLIED HEALTH/NURSE VISIT (OUTPATIENT)
Dept: NURSING | Facility: CLINIC | Age: 70
End: 2020-10-12
Payer: MEDICARE

## 2020-10-12 DIAGNOSIS — Z23 NEED FOR PROPHYLACTIC VACCINATION AND INOCULATION AGAINST INFLUENZA: Primary | ICD-10-CM

## 2020-10-12 PROCEDURE — G0008 ADMIN INFLUENZA VIRUS VAC: HCPCS

## 2020-10-12 PROCEDURE — 90662 IIV NO PRSV INCREASED AG IM: CPT

## 2020-10-12 RX ORDER — LEVOTHYROXINE SODIUM 50 UG/1
TABLET ORAL
Qty: 90 TABLET | Refills: 0 | Status: SHIPPED | OUTPATIENT
Start: 2020-10-12 | End: 2020-11-13

## 2020-10-12 RX ORDER — MELOXICAM 15 MG/1
TABLET ORAL
Qty: 90 TABLET | Refills: 0 | Status: SHIPPED | OUTPATIENT
Start: 2020-10-12 | End: 2020-11-13

## 2020-11-13 ENCOUNTER — OFFICE VISIT (OUTPATIENT)
Dept: INTERNAL MEDICINE | Facility: CLINIC | Age: 70
End: 2020-11-13
Payer: MEDICARE

## 2020-11-13 VITALS
SYSTOLIC BLOOD PRESSURE: 140 MMHG | OXYGEN SATURATION: 98 % | HEIGHT: 63 IN | WEIGHT: 225.8 LBS | TEMPERATURE: 98.2 F | DIASTOLIC BLOOD PRESSURE: 82 MMHG | BODY MASS INDEX: 40.01 KG/M2 | HEART RATE: 86 BPM | RESPIRATION RATE: 20 BRPM

## 2020-11-13 DIAGNOSIS — T78.40XS ALLERGIC REACTION, SEQUELA: ICD-10-CM

## 2020-11-13 DIAGNOSIS — E78.5 HYPERLIPIDEMIA LDL GOAL <100: ICD-10-CM

## 2020-11-13 DIAGNOSIS — E03.9 HYPOTHYROIDISM, UNSPECIFIED TYPE: ICD-10-CM

## 2020-11-13 DIAGNOSIS — M48.00 SPINAL STENOSIS, UNSPECIFIED SPINAL REGION: ICD-10-CM

## 2020-11-13 DIAGNOSIS — M19.071 PRIMARY OSTEOARTHRITIS OF BOTH ANKLES: ICD-10-CM

## 2020-11-13 DIAGNOSIS — I10 HYPERTENSION GOAL BP (BLOOD PRESSURE) < 140/90: Primary | ICD-10-CM

## 2020-11-13 DIAGNOSIS — Z51.81 ENCOUNTER FOR THERAPEUTIC DRUG MONITORING: ICD-10-CM

## 2020-11-13 DIAGNOSIS — E66.01 MORBID OBESITY (H): ICD-10-CM

## 2020-11-13 DIAGNOSIS — M19.072 PRIMARY OSTEOARTHRITIS OF BOTH ANKLES: ICD-10-CM

## 2020-11-13 LAB
ALT SERPL W P-5'-P-CCNC: 33 U/L (ref 0–50)
ANION GAP SERPL CALCULATED.3IONS-SCNC: 6 MMOL/L (ref 3–14)
BUN SERPL-MCNC: 18 MG/DL (ref 7–30)
CALCIUM SERPL-MCNC: 9.9 MG/DL (ref 8.5–10.1)
CHLORIDE SERPL-SCNC: 100 MMOL/L (ref 94–109)
CHOLEST SERPL-MCNC: 193 MG/DL
CO2 SERPL-SCNC: 29 MMOL/L (ref 20–32)
CREAT SERPL-MCNC: 0.64 MG/DL (ref 0.52–1.04)
GFR SERPL CREATININE-BSD FRML MDRD: >90 ML/MIN/{1.73_M2}
GLUCOSE SERPL-MCNC: 89 MG/DL (ref 70–99)
HDLC SERPL-MCNC: 71 MG/DL
LDLC SERPL CALC-MCNC: 109 MG/DL
NONHDLC SERPL-MCNC: 122 MG/DL
POTASSIUM SERPL-SCNC: 4.3 MMOL/L (ref 3.4–5.3)
SODIUM SERPL-SCNC: 135 MMOL/L (ref 133–144)
TRIGL SERPL-MCNC: 64 MG/DL
TSH SERPL DL<=0.005 MIU/L-ACNC: 1.98 MU/L (ref 0.4–4)

## 2020-11-13 PROCEDURE — 99214 OFFICE O/P EST MOD 30 MIN: CPT | Performed by: INTERNAL MEDICINE

## 2020-11-13 PROCEDURE — 80048 BASIC METABOLIC PNL TOTAL CA: CPT | Performed by: INTERNAL MEDICINE

## 2020-11-13 PROCEDURE — 84460 ALANINE AMINO (ALT) (SGPT): CPT | Performed by: INTERNAL MEDICINE

## 2020-11-13 PROCEDURE — 80061 LIPID PANEL: CPT | Performed by: INTERNAL MEDICINE

## 2020-11-13 PROCEDURE — 84443 ASSAY THYROID STIM HORMONE: CPT | Performed by: INTERNAL MEDICINE

## 2020-11-13 PROCEDURE — 36415 COLL VENOUS BLD VENIPUNCTURE: CPT | Performed by: INTERNAL MEDICINE

## 2020-11-13 RX ORDER — LEVOTHYROXINE SODIUM 50 UG/1
50 TABLET ORAL DAILY
Qty: 90 TABLET | Refills: 4 | Status: SHIPPED | OUTPATIENT
Start: 2020-11-13 | End: 2021-12-21

## 2020-11-13 RX ORDER — MELOXICAM 15 MG/1
15 TABLET ORAL DAILY
Qty: 90 TABLET | Refills: 4 | Status: SHIPPED | OUTPATIENT
Start: 2020-11-13 | End: 2021-12-21

## 2020-11-13 ASSESSMENT — MIFFLIN-ST. JEOR: SCORE: 1518.35

## 2020-11-13 ASSESSMENT — ACTIVITIES OF DAILY LIVING (ADL): CURRENT_FUNCTION: HOUSEWORK REQUIRES ASSISTANCE

## 2020-11-13 NOTE — LETTER
November 16, 2020      Rozina Bell  [address] [address]  [place] [address]        Dear ,    We are writing to inform you of your test results.    Normal thyroid. Normal electrolytes. Normal kidney function. Normal liver blood test. Stable cholesterol.       Resulted Orders   TSH WITH FREE T4 REFLEX   Result Value Ref Range    TSH 1.98 0.40 - 4.00 mU/L   Basic metabolic panel   Result Value Ref Range    Sodium 135 133 - 144 mmol/L    Potassium 4.3 3.4 - 5.3 mmol/L    Chloride 100 94 - 109 mmol/L    Carbon Dioxide 29 20 - 32 mmol/L    Anion Gap 6 3 - 14 mmol/L    Glucose 89 70 - 99 mg/dL      Comment:      Fasting specimen    Urea Nitrogen 18 7 - 30 mg/dL    Creatinine 0.64 0.52 - 1.04 mg/dL    GFR Estimate >90 >60 mL/min/[1.73_m2]      Comment:      Non  GFR Calc  Starting 12/18/2018, serum creatinine based estimated GFR (eGFR) will be   calculated using the Chronic Kidney Disease Epidemiology Collaboration   (CKD-EPI) equation.      GFR Estimate If Black >90 >60 mL/min/[1.73_m2]      Comment:       GFR Calc  Starting 12/18/2018, serum creatinine based estimated GFR (eGFR) will be   calculated using the Chronic Kidney Disease Epidemiology Collaboration   (CKD-EPI) equation.      Calcium 9.9 8.5 - 10.1 mg/dL   Lipid panel reflex to direct LDL Fasting   Result Value Ref Range    Cholesterol 193 <200 mg/dL    Triglycerides 64 <150 mg/dL      Comment:      Fasting specimen    HDL Cholesterol 71 >49 mg/dL    LDL Cholesterol Calculated 109 (H) <100 mg/dL      Comment:      Above desirable:  100-129 mg/dl  Borderline High:  130-159 mg/dL  High:             160-189 mg/dL  Very high:       >189 mg/dl      Non HDL Cholesterol 122 <130 mg/dL   ALT   Result Value Ref Range    ALT 33 0 - 50 U/L       If you have any questions or concerns, please call the clinic at the number listed above.       Sincerely,        Deisy Sharpe MD/fahad

## 2020-11-13 NOTE — PROGRESS NOTES
"SUBJECTIVE:   Rozina Bell is a 70 year old female who presents for Preventive Visit.    After discussion with patient on split billing and preventive visits, she decided to just do a office visit.    Healthy Habits:     In general, how would you rate your overall health?  Good    Frequency of exercise:  None    Do you usually eat at least 4 servings of fruit and vegetables a day, include whole grains    & fiber and avoid regularly eating high fat or \"junk\" foods?  Yes    Taking medications regularly:  Yes    Barriers to taking medications:  None    Medication side effects:  None    Ability to successfully perform activities of daily living:  Housework requires assistance    Home Safety:  No safety concerns identified    Hearing Impairment:  No hearing concerns    In the past 6 months, have you been bothered by leaking of urine? Yes    In general, how would you rate your overall mental or emotional health?  Excellent      PHQ-2 Total Score: 0    Additional concerns today:  Yes    Do you feel safe in your environment? Yes    Have you ever done Advance Care Planning? (For example, a Health Directive, POLST, or a discussion with a medical provider or your loved ones about your wishes): No, advance care planning information given to patient to review.  Patient declined advance care planning discussion at this time.    She had knee injections and had an allergic reaction.  She had a headache, low blood pressure, facial swelling.  She hasn't had this problem with the shots in her back.  She wasn't having problems breathing.  Her throat was scratchy though.  Fall risk  Fallen 2 or more times in the past year?: No  Any fall with injury in the past year?: No    Cognitive Screening   1) Repeat 3 items (Leader, Season, Table)    2) Clock draw: NORMAL  3) 3 item recall: Recalls 3 objects  Results: 3 items recalled: COGNITIVE IMPAIRMENT LESS LIKELY    Mini-CogTM Copyright S Liliane. Licensed by the author for use in [facility]; reprinted with permission (soob@[facility]). All rights reserved.      Do you have sleep apnea, excessive snoring or daytime drowsiness?: no    Reviewed and updated as needed this visit by clinical staff  Tobacco  Allergies  Meds  Problems  Med Hx  Surg Hx  Fam Hx  Soc Hx          Reviewed and updated as needed this visit by Provider  Tobacco  Allergies  Meds  Problems  Med Hx  Surg Hx  Fam Hx         Social History     Tobacco Use     Smoking status: Never Smoker     Smokeless tobacco: Never Used     Tobacco comment: Lives in smoke free household   Substance Use Topics     Alcohol use: No     If you drink alcohol do you typically have >3 drinks per day or >7 drinks per week? No          -------------------------------------    Current providers sharing in care for this patient include:   Patient Care Team:  Deisy Sharpe MD as PCP - Jesse Arango MD as MD (Specialist)  Emmanuel Max MD as MD (Orthopedics)  Deisy Sharpe MD as Assigned PCP  Marcos Dias MD as Assigned Surgical Provider    The following health maintenance items are reviewed in Epic and correct as of today:  Health Maintenance   Topic Date Due     ZOSTER IMMUNIZATION (2 of 3) 11/18/2015     EYE EXAM  10/08/2019     FALL RISK ASSESSMENT  11/08/2020     MEDICARE ANNUAL WELLNESS VISIT  11/08/2020     TSH W/FREE T4 REFLEX  11/08/2020     DTAP/TDAP/TD IMMUNIZATION (7 - Td) 08/08/2022     MAMMO SCREENING  10/26/2022     LIPID  11/08/2024     ADVANCE CARE PLANNING  11/08/2024     COLORECTAL CANCER SCREENING  11/16/2027     DEXA  Completed     HEPATITIS C SCREENING  Completed     PHQ-2  Completed     INFLUENZA VACCINE  Completed     Pneumococcal Vaccine: 65+ Years  Completed     Pneumococcal Vaccine: Pediatrics (0 to 5 Years) and At-Risk Patients (6 to 64 Years)  Aged Out     IPV IMMUNIZATION  Aged Out     MENINGITIS IMMUNIZATION  Aged Out     HEPATITIS B IMMUNIZATION  Aged Out  "    Lab work is in process  Mammogram Screening: Mammogram Screening: Patient over age 50, mutual decision to screen reflected in health maintenance.    Review of Systems   ROS: 10 point ROS neg other than the symptoms noted above in the HPI.     OBJECTIVE:   BP (!) 140/80   Pulse 86   Temp 98.2  F (36.8  C) (Oral)   Resp 20   Ht 1.6 m (5' 3\")   Wt 102.4 kg (225 lb 12.8 oz)   SpO2 98%   BMI 40.00 kg/m   Estimated body mass index is 40 kg/m  as calculated from the following:    Height as of this encounter: 1.6 m (5' 3\").    Weight as of this encounter: 102.4 kg (225 lb 12.8 oz).  Physical Exam  GENERAL APPEARANCE: healthy, alert and no distress  NECK: no adenopathy, no asymmetry, masses, or scars and thyroid normal to palpation  RESP: lungs clear to auscultation - no rales, rhonchi or wheezes  CV: regular rates and rhythm and normal S1 S2, no S3 or S4  ABDOMEN:  soft, nontender, no HSM or masses and bowel sounds normal  SKIN: no suspicious lesions or rashes  PSYCH: mentation appears normal. and affect normal/bright  No edema   Arthritic changes of the knees and ankles and feet.     Diagnostic Test Results:  Labs reviewed in Epic    ASSESSMENT / PLAN:   1. Hypertension goal BP (blood pressure) < 140/90  Poor control.  Patient will check at home.   - Basic metabolic panel    Morbid obesity - she has been losing some weight.    2. Hypothyroidism, unspecified type  Well controlled with medications without side effects.   - TSH WITH FREE T4 REFLEX  - levothyroxine (SYNTHROID/LEVOTHROID) 50 MCG tablet; Take 1 tablet (50 mcg) by mouth daily  Dispense: 90 tablet; Refill: 4      4. Spinal stenosis, unspecified spinal region  Well controlled with medications without side effects.   - meloxicam (MOBIC) 15 MG tablet; Take 1 tablet (15 mg) by mouth daily  Dispense: 90 tablet; Refill: 4    5. Hyperlipidemia LDL goal <100  Minimally elevated in the past   - Lipid panel reflex to direct LDL Fasting    6. Encounter for " "therapeutic drug monitoring    - ALT    7. Primary osteoarthritis of both ankles    - Miscellaneous Order for DME - ONLY FOR DME    8. Allergic reaction, sequela  Will get records from ortho regarding what was injected so we can determine what she was allergic to.  Will need to meet with Allergy to determine the next course of action as I suspect she will have more injection needed in the future given her severe osteoarthritis   - ALLERGY/ASTHMA ADULT REFERRAL    Patient has been advised of split billing requirements and indicates understanding: Yes  COUNSELING:  Reviewed preventive health counseling, as reflected in patient instructions    Estimated body mass index is 40 kg/m  as calculated from the following:    Height as of this encounter: 1.6 m (5' 3\").    Weight as of this encounter: 102.4 kg (225 lb 12.8 oz).        She reports that she has never smoked. She has never used smokeless tobacco.      Appropriate preventive services were discussed with this patient, including applicable screening as appropriate for cardiovascular disease, diabetes, osteopenia/osteoporosis, and glaucoma.  As appropriate for age/gender, discussed screening for colorectal cancer, prostate cancer, breast cancer, and cervical cancer. Checklist reviewing preventive services available has been given to the patient.    Reviewed patients plan of care and provided an AVS. The Basic Care Plan (routine screening as documented in Health Maintenance) for Rozina meets the Care Plan requirement. This Care Plan has been established and reviewed with the Patient.    Counseling Resources:  ATP IV Guidelines  Pooled Cohorts Equation Calculator  Breast Cancer Risk Calculator  Breast Cancer: Medication to Reduce Risk  FRAX Risk Assessment  ICSI Preventive Guidelines  Dietary Guidelines for Americans, 2010  USDA's MyPlate  ASA Prophylaxis  Lung CA Screening    Deisy Sharpe MD  [facility]    Identified Health Risks:    Patient " Instructions   Check your blood pressure at home.  It needs to be less than 140/90 with the ideal being 120/70.    Make an appointment with the allergist.    The Cologuard will be mailed to you.    Rehana at some point in the future.

## 2020-11-13 NOTE — PATIENT INSTRUCTIONS
Check your blood pressure at home.  It needs to be less than 140/90 with the ideal being 120/70.    Make an appointment with the allergist.    The Cologuard will be mailed to you.    Rehana at some point in the future.

## 2020-11-16 NOTE — RESULT ENCOUNTER NOTE
Normal thyroid. Normal electrolytes. Normal kidney function. Normal liver blood test. Stable cholesterol.

## 2020-11-30 ENCOUNTER — VIRTUAL VISIT (OUTPATIENT)
Dept: ALLERGY | Facility: CLINIC | Age: 70
End: 2020-11-30
Attending: INTERNAL MEDICINE
Payer: MEDICARE

## 2020-11-30 ENCOUNTER — TRANSFERRED RECORDS (OUTPATIENT)
Dept: HEALTH INFORMATION MANAGEMENT | Facility: CLINIC | Age: 70
End: 2020-11-30

## 2020-11-30 DIAGNOSIS — T50.905A ADVERSE EFFECT OF DRUG, INITIAL ENCOUNTER: Primary | ICD-10-CM

## 2020-11-30 LAB — COLOGUARD-ABSTRACT: NEGATIVE

## 2020-11-30 PROCEDURE — 99204 OFFICE O/P NEW MOD 45 MIN: CPT | Mod: 95 | Performed by: ALLERGY & IMMUNOLOGY

## 2020-11-30 RX ORDER — EPINEPHRINE 0.3 MG/.3ML
0.3 INJECTION SUBCUTANEOUS PRN
Qty: 0.6 ML | Refills: 3 | Status: SHIPPED | OUTPATIENT
Start: 2020-11-30 | End: 2022-02-02

## 2020-11-30 NOTE — PROGRESS NOTES
"Rozina Bell is a 70 year old female who is being evaluated via a billable video visit.      The patient has been notified of following:     \"This video visit will be conducted via a call between you and your physician/provider. We have found that certain health care needs can be provided without the need for an in-person physical exam.  This service lets us provide the care you need with a video conversation.  If a prescription is necessary we can send it directly to your pharmacy.  If lab work is needed we can place an order for that and you can then stop by our lab to have the test done at a later time.    Video visits are billed at different rates depending on your insurance coverage.  Please reach out to your insurance provider with any questions.    If during the course of the call the physician/provider feels a video visit is not appropriate, you will not be charged for this service.\"    Patient has given verbal consent for Video visit? Yes  How would you like to obtain your AVS? Mail a copy  If you are dropped from the video visit, the video invite should be resent to: Other e-mail: [email or web address]  Will anyone else be joining your video visit? No        Video-Visit Details    Type of service:  Video Visit    Video Start Time: 4:09 PM  Video End Time: 4:43 PM    Originating Location (pt. Location): Home    Distant Location (provider location):  [facility]     Platform used for Video Visit: Me[employer]      Dear Deisy Sharpe MD,    Thank you for referring your patient Rozina Bell to the Allergy/Immunology Clinic. Rozina Bell was seen in the Allergy Clinic at [facility].    Rozina Bell is a 70 year old White female being seen today at the request of Dr. Sharpe in consultation for allergies. She states that she has multiple allergies. She was recently seen at [facility] and she had injections in her knees. After this injection " "she had an allergic reaction. She drove home after this visit. Within 15-20 minutes of receiving the injections she began to have a headache. 15 minutes later her eyes were swelling and her face was flushed. She also felt like she wanted to \"keel over\" and suspects this was because her blood pressure was dropping. Rozina states her blood pressure has been low with previous allergic reactions. She also had a sore throat. She told her  what she was feeling and took a couple of acetaminophen. She did not want to go to the ED due to concern that she may be exposed to COVID-19. Rozina did not take any other medications and \"waited it out.\" By morning she was fine. She did not develop any rash or hives, shortness of breath, coughing, wheezing, nausea, or vomiting during this period. She did not have any localized redness, swelling, or itching. She states that she had a similar reaction 4 or 5 years ago when she received steroid injections in her hands though her recent reaction was more severe. Rozina is unsure what triggered the reaction. She states she has been receiving steroid injections in her back for many years and has never had a problem with these injections.    Rozina reports that she has multiple environmental and medication allergies. About 30 or 40 years ago she had been given an injection of penicillin for an infection. She recalls that she developed a rash and felt her throat was starting to close. She soiled herself and keeled over and couldn't get off the floor to make a phone call. Eventually she was able to reach the phone and called a neighbor who drove her to the ED. She was monitored for several hours and discharged with a prescription for prednisone. Rozina recalls she had taken the penicillin 10 days prior to this reaction.    Rozina's daily medications include meloxicam and acetaminophen. She does not recall any recent illness and had been feeling well prior to and since her knee injection. She " does not drink alcohol and had not been exercising or physically active prior to the onset of her symptoms.    Per review of her previous medical records, Rozina received lidocaine without epinephrine and Depo-Medrol injections in both knees on 8/18/20. She also received 1% lidocaine without epinephrine and Depo-Medrol injected in bilateral first CMC joints on 4/27/16. Her most recent injections of the bilateral L4/L5 neuroforamen were administered on 8/3/20. She was given 1% lidocaine to anesthetize followed by 0.5% bupivacaine and dexamethasone. She had a similar injection on her back on 12/21/17 and 1% lidocaine, 0.2% ropivacaine, and dexamethasone were injected.      Past Medical History:   Diagnosis Date     Allergies      Electrolyte disturbance     h/o since had a pcn reaction     Endometriosis      Fibromyalgia      Flat feet      Hypomagnesemia      Hypothyroidism      Osteoarthritis     Rheum- Dr. Ferrer     Skin cancer 8/8/2012     Family History   Problem Relation Age of Onset     Heart Disease Mother      Arthritis Mother      Cardiovascular Mother      Thyroid Disease Mother      Hypertension Mother      Cerebrovascular Disease Mother      Gastrointestinal Disease Father      Cancer Father      Respiratory Father      Heart Disease Maternal Grandfather      Diabetes Paternal Grandfather      Cancer Brother      Diabetes Brother      Heart Disease Sister      Neurologic Disorder Sister         EPILEPSY     Allergies Son      Heart Disease Brother         TRIPLE BYPASS     Lipids Brother      Hypertension Brother      Chronic Obstructive Pulmonary Disease Brother      Glaucoma No family hx of      Macular Degeneration No family hx of      Past Surgical History:   Procedure Laterality Date     BREAST BIOPSY, RT/LT  8/08    Breat Biopsy RT/LT     C VAG HYST,RMV TUBE/OVARY  age 34    endometriosis     HC REMOVAL GALLBLADDER  age 31     HYSTERECTOMY, PAP NO LONGER INDICATED         ENVIRONMENTAL HISTORY: [place]  [place]. The home is heated with a forced air. They do have central air conditioning. The patient's bedroom is furnished with carpeting in bedroom and fabric window coverings.  Pets inside the house include 1 cat(s). There is no history of cockroach or mice infestation. There is/are 0 smokers living in the house.  There is/are 0 who smoke ecigarettes/vape living in the house.The house does not have a damp basement.     SOCIAL HISTORY:   Rozina is retired. She lives with her spouse.  Her spouse works as a/an customer service/food industry.    REVIEW OF SYSTEMS:  General: negative for weight gain. negative for weight loss. negative for changes in sleep.   Eyes: negative for itching. negative for redness. negative for tearing/watering. negative for vision changes  Ears: negative for fullness. negative for hearing loss. negative for dizziness.   Nose: negative for snoring.negative for changes in smell. negative for drainage.   Throat: negative for hoarseness. negative for sore throat. negative for trouble swallowing.   Lungs: negative for cough. negative for shortness of breath.negative for wheezing. negative for sputum production.   Cardiovascular: negative for chest pain. negative for swelling of ankles. negative for fast or irregular heartbeat.   Gastrointestinal: negative for nausea. negative for heartburn. negative for acid reflux.   Musculoskeletal: negative for joint pain. negative for joint stiffness. negative for joint swelling.   Neurologic: negative for seizures. negative for fainting. negative for weakness.   Psychiatric: negative for changes in mood. negative for anxiety.   Endocrine: negative for cold intolerance. negative for heat intolerance. negative for tremors.   Hematologic: negative for easy bruising. negative for easy bleeding.  Integumentary: negative for rash. negative for scaling. negative for nail changes.       Current Outpatient Medications:       Ascorbic Acid (VITAMIN C PO), Take 500 mg by mouth daily, Disp: , Rfl:      CALCIUM + D 600-200 MG-UNIT OR TABS, 1 TABLET TWICE  DAILY, Disp: , Rfl:      Cholecalciferol (VITAMIN D) 1000 UNITS capsule, Take 2,000 Units by mouth daily, Disp: 180 capsule, Rfl: 4     GLUCOSAMINE CHONDROITIN OR TABS, 1 TABLET DAILY, Disp: , Rfl:      levothyroxine (SYNTHROID/LEVOTHROID) 50 MCG tablet, Take 1 tablet (50 mcg) by mouth daily, Disp: 90 tablet, Rfl: 4     magnesium oxide 400 MG CAPS, Take 1 tablet by mouth 3 times daily, Disp: 270 capsule, Rfl: 4     meloxicam (MOBIC) 15 MG tablet, Take 1 tablet (15 mg) by mouth daily, Disp: 90 tablet, Rfl: 4     MULTI-VITAMIN OR, None Entered, Disp: , Rfl:      VITAMIN E OR, 1 TABLET DAILY, Disp: , Rfl:   Immunization History   Administered Date(s) Administered     FLU 6-35 months 10/01/2008, 10/06/2009, 10/01/2010, 09/21/2012     Influenza (H1N1) 01/15/2010     Influenza (High Dose) 3 valent vaccine 09/26/2014, 10/17/2016, 10/06/2017, 10/08/2018, 09/24/2019     Influenza (IIV3) PF 10/22/2001, 10/04/2002, 09/29/2003, 10/16/2004, 10/17/2005, 10/11/2006, 10/11/2007, 10/01/2008, 10/06/2009, 10/01/2010, 11/02/2011, 09/21/2012     Influenza (intradermal) 10/23/2013     Influenza Vaccine IM > 6 months Valent IIV4 10/23/2013, 09/23/2015     Influenza, Quad, High Dose, Pf, 65yr + 10/12/2020     Pneumo Conj 13-V (2010&after) 12/09/2016     Pneumococcal 23 valent 11/15/2017     TD (ADULT, 7+) 08/01/1979, 09/29/2003, 09/26/2006     TDAP Vaccine (Adacel) 08/08/2012     Td (Adult), Adsorbed 01/01/1993, 01/01/1996, 09/29/2003     Zoster vaccine, live 09/23/2015     Allergies   Allergen Reactions     Amitriptyline      jittery     Celebrex [Celecoxib]      Corticosteroids      Epinephrine-Lidocaine-Na Metabisulfite Other (See Comments)     Palpitations, flushing     Latex      Morphine      Penicillins      Sulfa Drugs      Ultram [Tramadol Hcl]      Yellow Dye      Can't remember, but ends up in ER with  reaction     Zithromax [Azithromycin]          EXAM:   There were no vitals taken for this visit.  GENERAL APPEARANCE: alert, cooperative and not in distress  SKIN: no rashes, no lesions  HEAD: atraumatic, normocephalic  EYES: lids and lashes normal, EOM full and intact  ENT: normal external ears and nose, no nasal drainage  NECK: no asymmetry, masses, or scars  LUNGS: unlabored respirations, no intercostal retractions or accessory muscle use, speaking comfortably in full sentences, no cough or audible wheezing  MUSCULOSKELETAL: no musculoskeletal defects are noted  NEURO: no focal deficits noted  PSYCH: does not appear depressed or anxious    WORKUP: None    ASSESSMENT/PLAN:  Rozina Bell is a 70 year old female seen for evaluation of medication allergies.    1. Adverse effect of drug, initial encounter - Rozina reports developing symptoms of headache, swelling, flushing, and feeling lightheaded starting 15 minutes after receiving steroid injections in her knees. She states she experienced similar symptoms after receiving injections in her hands. Both reactions occurred after she received intra-articular methylprednisolone. She has tolerated local anesthetics and alternative steroids, dexamethasone, as demonstrated by other injections. Although acute hypersensitivity reactions to steroid medications are rare they have been reported. Most commonly these reactions occur after IV or oral administration however intra-articular administration has also been reported. Methylprednisolone is the most common cause of these reactions. Acute hypersensitivity reactions to steroids are generally limited to individual agents and cross-reactivity across classes has not been demonstrated as similarly seen in delayed hypersensitivity reactions. Skin testing can be done though there are limitations in the negative and positive predictive value of these tests.    - recommend avoidance of methylprednisolone  - consider skin  prick/intradermal testing to methylprednisolone as well as steroids from other classes to identify potentially safe alternatives  - EPINEPHrine (ANY BX GENERIC EQUIV) 0.3 MG/0.3ML injection 2-pack; Inject 0.3 mLs (0.3 mg) into the muscle as needed for anaphylaxis  Dispense: 0.6 mL; Refill: 3  - Tryptase; Future      Thank you for allowing me to participate in the care of Rozina Bell.      Kathryn Bennett MD, FAAAAI  Allergy/Immunology  [facility]      Chart documentation done in part with Dragon Voice Recognition Software. Although reviewed after completion, some word and grammatical errors may remain.

## 2020-11-30 NOTE — LETTER
"    11/30/2020         RE: Rozina Bell  [address] [address]  [place] [address]        Dear Colleague,    Thank you for referring your patient, Rozina Bell, to the [facility]. Please see a copy of my visit note below.    Rozina Bell is a 70 year old female who is being evaluated via a billable video visit.      The patient has been notified of following:     \"This video visit will be conducted via a call between you and your physician/provider. We have found that certain health care needs can be provided without the need for an in-person physical exam.  This service lets us provide the care you need with a video conversation.  If a prescription is necessary we can send it directly to your pharmacy.  If lab work is needed we can place an order for that and you can then stop by our lab to have the test done at a later time.    Video visits are billed at different rates depending on your insurance coverage.  Please reach out to your insurance provider with any questions.    If during the course of the call the physician/provider feels a video visit is not appropriate, you will not be charged for this service.\"    Patient has given verbal consent for Video visit? Yes  How would you like to obtain your AVS? Mail a copy  If you are dropped from the video visit, the video invite should be resent to: Other e-mail: [email or web address]  Will anyone else be joining your video visit? No        Video-Visit Details    Type of service:  Video Visit    Video Start Time: 4:09 PM  Video End Time: 4:43 PM    Originating Location (pt. Location): Home    Distant Location (provider location):  [facility]     Platform used for Video Visit: Archie      Dear Deisy Sharpe MD,    Thank you for referring your patient Rozina Bell to the Allergy/Immunology Clinic. Rozina Bell was seen in the Allergy Clinic at [facility].    Rozina Bell is a " "70 year old White female being seen today at the request of Dr. Sharpe in consultation for allergies. She states that she has multiple allergies. She was recently seen at [facility] and she had injections in her knees. After this injection she had an allergic reaction. She drove home after this visit. Within 15-20 minutes of receiving the injections she began to have a headache. 15 minutes later her eyes were swelling and her face was flushed. She also felt like she wanted to \"keel over\" and suspects this was because her blood pressure was dropping. Rozina states her blood pressure has been low with previous allergic reactions. She also had a sore throat. She told her  what she was feeling and took a couple of acetaminophen. She did not want to go to the ED due to concern that she may be exposed to COVID-19. Rozina did not take any other medications and \"waited it out.\" By morning she was fine. She did not develop any rash or hives, shortness of breath, coughing, wheezing, nausea, or vomiting during this period. She did not have any localized redness, swelling, or itching. She states that she had a similar reaction 4 or 5 years ago when she received steroid injections in her hands though her recent reaction was more severe. Rozina is unsure what triggered the reaction. She states she has been receiving steroid injections in her back for many years and has never had a problem with these injections.    Rozina reports that she has multiple environmental and medication allergies. About 30 or 40 years ago she had been given an injection of penicillin for an infection. She recalls that she developed a rash and felt her throat was starting to close. She soiled herself and keeled over and couldn't get off the floor to make a phone call. Eventually she was able to reach the phone and called a neighbor who drove her to the ED. She was monitored for several hours and discharged with a prescription for prednisone. " Rozina recalls she had taken the penicillin 10 days prior to this reaction.    Rozina's daily medications include meloxicam and acetaminophen. She does not recall any recent illness and had been feeling well prior to and since her knee injection. She does not drink alcohol and had not been exercising or physically active prior to the onset of her symptoms.    Per review of her previous medical records, Rozina received lidocaine without epinephrine and Depo-Medrol injections in both knees on 8/18/20. She also received 1% lidocaine without epinephrine and Depo-Medrol injected in bilateral first CMC joints on 4/27/16. Her most recent injections of the bilateral L4/L5 neuroforamen were administered on 8/3/20. She was given 1% lidocaine to anesthetize followed by 0.5% bupivacaine and dexamethasone. She had a similar injection on her back on 12/21/17 and 1% lidocaine, 0.2% ropivacaine, and dexamethasone were injected.      Past Medical History:   Diagnosis Date     Allergies      Electrolyte disturbance     h/o since had a pcn reaction     Endometriosis      Fibromyalgia      Flat feet      Hypomagnesemia      Hypothyroidism      Osteoarthritis     Rheum- Dr. Ferrer     Skin cancer 8/8/2012     Family History   Problem Relation Age of Onset     Heart Disease Mother      Arthritis Mother      Cardiovascular Mother      Thyroid Disease Mother      Hypertension Mother      Cerebrovascular Disease Mother      Gastrointestinal Disease Father      Cancer Father      Respiratory Father      Heart Disease Maternal Grandfather      Diabetes Paternal Grandfather      Cancer Brother      Diabetes Brother      Heart Disease Sister      Neurologic Disorder Sister         EPILEPSY     Allergies Son      Heart Disease Brother         TRIPLE BYPASS     Lipids Brother      Hypertension Brother      Chronic Obstructive Pulmonary Disease Brother      Glaucoma No family hx of      Macular Degeneration No family hx of      Past Surgical History:    Procedure Laterality Date     BREAST BIOPSY, RT/LT  8/08    Breat Biopsy RT/LT     C VAG HYST,RMV TUBE/OVARY  age 34    endometriosis     HC REMOVAL GALLBLADDER  age 31     HYSTERECTOMY, PAP NO LONGER INDICATED         ENVIRONMENTAL HISTORY: [place]. The home is heated with a forced air. They do have central air conditioning. The patient's bedroom is furnished with carpeting in bedroom and fabric window coverings.  Pets inside the house include 1 cat(s). There is no history of cockroach or mice infestation. There is/are 0 smokers living in the house.  There is/are 0 who smoke ecigarettes/vape living in the house.The house does not have a damp basement.     SOCIAL HISTORY:   Rozina is retired. She lives with her spouse.  Her spouse works as a/an customer service/food industry.    REVIEW OF SYSTEMS:  General: negative for weight gain. negative for weight loss. negative for changes in sleep.   Eyes: negative for itching. negative for redness. negative for tearing/watering. negative for vision changes  Ears: negative for fullness. negative for hearing loss. negative for dizziness.   Nose: negative for snoring.negative for changes in smell. negative for drainage.   Throat: negative for hoarseness. negative for sore throat. negative for trouble swallowing.   Lungs: negative for cough. negative for shortness of breath.negative for wheezing. negative for sputum production.   Cardiovascular: negative for chest pain. negative for swelling of ankles. negative for fast or irregular heartbeat.   Gastrointestinal: negative for nausea. negative for heartburn. negative for acid reflux.   Musculoskeletal: negative for joint pain. negative for joint stiffness. negative for joint swelling.   Neurologic: negative for seizures. negative for fainting. negative for weakness.   Psychiatric: negative for changes in mood. negative for anxiety.   Endocrine: negative for cold intolerance.  negative for heat intolerance. negative for tremors.   Hematologic: negative for easy bruising. negative for easy bleeding.  Integumentary: negative for rash. negative for scaling. negative for nail changes.       Current Outpatient Medications:      Ascorbic Acid (VITAMIN C PO), Take 500 mg by mouth daily, Disp: , Rfl:      CALCIUM + D 600-200 MG-UNIT OR TABS, 1 TABLET TWICE  DAILY, Disp: , Rfl:      Cholecalciferol (VITAMIN D) 1000 UNITS capsule, Take 2,000 Units by mouth daily, Disp: 180 capsule, Rfl: 4     GLUCOSAMINE CHONDROITIN OR TABS, 1 TABLET DAILY, Disp: , Rfl:      levothyroxine (SYNTHROID/LEVOTHROID) 50 MCG tablet, Take 1 tablet (50 mcg) by mouth daily, Disp: 90 tablet, Rfl: 4     magnesium oxide 400 MG CAPS, Take 1 tablet by mouth 3 times daily, Disp: 270 capsule, Rfl: 4     meloxicam (MOBIC) 15 MG tablet, Take 1 tablet (15 mg) by mouth daily, Disp: 90 tablet, Rfl: 4     MULTI-VITAMIN OR, None Entered, Disp: , Rfl:      VITAMIN E OR, 1 TABLET DAILY, Disp: , Rfl:   Immunization History   Administered Date(s) Administered     FLU 6-35 months 10/01/2008, 10/06/2009, 10/01/2010, 09/21/2012     Influenza (H1N1) 01/15/2010     Influenza (High Dose) 3 valent vaccine 09/26/2014, 10/17/2016, 10/06/2017, 10/08/2018, 09/24/2019     Influenza (IIV3) PF 10/22/2001, 10/04/2002, 09/29/2003, 10/16/2004, 10/17/2005, 10/11/2006, 10/11/2007, 10/01/2008, 10/06/2009, 10/01/2010, 11/02/2011, 09/21/2012     Influenza (intradermal) 10/23/2013     Influenza Vaccine IM > 6 months Valent IIV4 10/23/2013, 09/23/2015     Influenza, Quad, High Dose, Pf, 65yr + 10/12/2020     Pneumo Conj 13-V (2010&after) 12/09/2016     Pneumococcal 23 valent 11/15/2017     TD (ADULT, 7+) 08/01/1979, 09/29/2003, 09/26/2006     TDAP Vaccine (Adacel) 08/08/2012     Td (Adult), Adsorbed 01/01/1993, 01/01/1996, 09/29/2003     Zoster vaccine, live 09/23/2015     Allergies   Allergen Reactions     Amitriptyline      rupinder     Celebrex [Celecoxib]       Corticosteroids      Epinephrine-Lidocaine-Na Metabisulfite Other (See Comments)     Palpitations, flushing     Latex      Morphine      Penicillins      Sulfa Drugs      Ultram [Tramadol Hcl]      Yellow Dye      Can't remember, but ends up in ER with reaction     Zithromax [Azithromycin]          EXAM:   There were no vitals taken for this visit.  GENERAL APPEARANCE: alert, cooperative and not in distress  SKIN: no rashes, no lesions  HEAD: atraumatic, normocephalic  EYES: lids and lashes normal, EOM full and intact  ENT: normal external ears and nose, no nasal drainage  NECK: no asymmetry, masses, or scars  LUNGS: unlabored respirations, no intercostal retractions or accessory muscle use, speaking comfortably in full sentences, no cough or audible wheezing  MUSCULOSKELETAL: no musculoskeletal defects are noted  NEURO: no focal deficits noted  PSYCH: does not appear depressed or anxious    WORKUP: None    ASSESSMENT/PLAN:  Rozina Bell is a 70 year old female seen for evaluation of medication allergies.    1. Adverse effect of drug, initial encounter - Rozina reports developing symptoms of headache, swelling, flushing, and feeling lightheaded starting 15 minutes after receiving steroid injections in her knees. She states she experienced similar symptoms after receiving injections in her hands. Both reactions occurred after she received intra-articular methylprednisolone. She has tolerated local anesthetics and alternative steroids, dexamethasone, as demonstrated by other injections. Although acute hypersensitivity reactions to steroid medications are rare they have been reported. Most commonly these reactions occur after IV or oral administration however intra-articular administration has also been reported. Methylprednisolone is the most common cause of these reactions. Acute hypersensitivity reactions to steroids are generally limited to individual agents and cross-reactivity across classes has not been  demonstrated as similarly seen in delayed hypersensitivity reactions. Skin testing can be done though there are limitations in the negative and positive predictive value of these tests.    - recommend avoidance of methylprednisolone  - consider skin prick/intradermal testing to methylprednisolone as well as steroids from other classes to identify potentially safe alternatives  - EPINEPHrine (ANY BX GENERIC EQUIV) 0.3 MG/0.3ML injection 2-pack; Inject 0.3 mLs (0.3 mg) into the muscle as needed for anaphylaxis  Dispense: 0.6 mL; Refill: 3  - Tryptase; Future      Thank you for allowing me to participate in the care of Rozina Bell.      Kathryn Bennett MD, FAAAAI  Allergy/Immunology  [facility]      Chart documentation done in part with Dragon Voice Recognition Software. Although reviewed after completion, some word and grammatical errors may remain.      Again, thank you for allowing me to participate in the care of your patient.        Sincerely,        Kathryn Bennett MD

## 2020-12-04 ENCOUNTER — TELEPHONE (OUTPATIENT)
Dept: INTERNAL MEDICINE | Facility: CLINIC | Age: 70
End: 2020-12-04

## 2020-12-04 NOTE — TELEPHONE ENCOUNTER
Cologuard results have been received.     The results are: Negative.    Result Date: 12-1-2020    Results have been placed in provider basket- provider to review and sign off on results. Please send back to team with OK to send result letter and any additional follow-up needed.      will send copy of results to scanning after provider signs off.  Emy Tolbert,

## 2021-01-11 ENCOUNTER — VIRTUAL VISIT (OUTPATIENT)
Dept: INTERNAL MEDICINE | Facility: CLINIC | Age: 71
End: 2021-01-11
Payer: MEDICARE

## 2021-01-11 ENCOUNTER — TELEPHONE (OUTPATIENT)
Dept: ALLERGY | Facility: CLINIC | Age: 71
End: 2021-01-11

## 2021-01-11 DIAGNOSIS — R00.2 HEART PALPITATIONS: Primary | ICD-10-CM

## 2021-01-11 DIAGNOSIS — T78.40XS ALLERGY, SEQUELA: ICD-10-CM

## 2021-01-11 DIAGNOSIS — E03.9 HYPOTHYROIDISM, UNSPECIFIED TYPE: ICD-10-CM

## 2021-01-11 PROCEDURE — 99443 PR PHYSICIAN TELEPHONE EVALUATION 21-30 MIN: CPT | Performed by: INTERNAL MEDICINE

## 2021-01-11 NOTE — TELEPHONE ENCOUNTER
Patient was referred for allergy testing, she wants to know how exactly that is done before setting up a appt.

## 2021-01-11 NOTE — PROGRESS NOTES
"Rozina is a 70 year old who is being evaluated via a billable telephone visit.    What phone number would you like to be contacted at? [phone]   How would you like to obtain your AVS? Mail a copy  Assessment & Plan     Heart palpitations  Unclear etiology.  Happened with low magnesium in the past.  Declines zio but this is needed to further diagnose.    - Magnesium; Future  - **Hemoglobin FUTURE anytime; Future    Hypothyroidism, unspecified type  Well controlled with medications without side effects. Levels are normal.     Allergy, sequela  Advised to  her epi pen.  She will connect with Dr. Bennett on prick testing.                 22 minutes spent on the date of the encounter doing chart review, history and exam, documentation and further activities as noted above         BMI:   Estimated body mass index is 40 kg/m  as calculated from the following:    Height as of 11/13/20: 1.6 m (5' 3\").    Weight as of 11/13/20: 102.4 kg (225 lb 12.8 oz).         There are no Patient Instructions on file for this visit.     No follow-ups on file.    Deisy Sharpe MD  [facility]  Start 5:17 PM     Subjective     Rozina is a 70 year old who presents to clinic today for the following health issues   HPI     She has been really tired.  Thyroid lab was ok.    She has lost more weight, although slower than she would like.       Her heart has been racing.  It is jumping in her chest.  She will get hot but not necessarily due to her heart.  No chest pain, short of breath or diaphoresis.  Sometimes there will be an anxious type feeling.  If she has racing she will hold her breath and it goes away.  It will last 30 seconds at most.  It doesn't happy daily.  She only feels it at rest.      Her blood pressure at home is 80/50's.  After coffee it is 120's/70's.      She doesn't want to do a zio due to adhesive reaction in the past.      She is taking her levothyroxine at night and wonders if this is ok. "             Review of Systems    ROS: 10 point ROS neg other than the symptoms noted above in the HPI.       Objective           Vitals:  No vitals were obtained today due to virtual visit.    Physical Exam   healthy, alert and no distress  PSYCH: Alert and oriented times 3; coherent speech, normal   rate and volume, able to articulate logical thoughts, able   to abstract reason, no tangential thoughts, no hallucinations   or delusions  Her affect is normal  RESP: No cough, no audible wheezing, able to talk in full sentences  Remainder of exam unable to be completed due to telephone visits    Transferred Records on 11/30/2020   Component Date Value Ref Range Status     COLOGUARD-ABSTRACT 11/30/2020 Negative   Final             Stop 5:38 PM   Phone call duration: 21 minutes

## 2021-01-11 NOTE — PATIENT INSTRUCTIONS
Schedule a lab appointment for a magnesium level.  Make a follow up appointment with Dr. Bennett for your skin prick testing.  Consider a heart monitor.

## 2021-01-12 NOTE — TELEPHONE ENCOUNTER
Returned call to patient to discuss allergy testing. Patient would like to wait on skin testing until she is able to get the COVID vaccine and wanted to know how long those orders would be good for her. Patient also has questions in regards to getting the COVID vaccine and also the Shinrix vaccine and if she is safe to get these with her history. Patient would like to know if she would be able to get the COVID vaccine in the clinic versus the pharmacy when it is available. Lastly, patient states she also has concerns with possible reaction to the cement that they would use in her knee surgery.     Please advise.  Mini TURNER MA

## 2021-01-18 NOTE — TELEPHONE ENCOUNTER
Returned call to patient to discuss her concerns. Her question relates to whether she should receive the COVID-19 vaccine or wait due to her history of reacting after the steroid injection. She was advised that due to her history of reacting to an injectable medication it is advisable to wait at this time until she can be further evaluated regarding her previous reaction. We reviewed the process for skin testing and will plan to reach out to [facility] to determine which form of Depo-Medrol was used for her injections to proceed with testing. She verbalized understanding and agreement with the plan.

## 2021-01-22 DIAGNOSIS — R00.2 HEART PALPITATIONS: ICD-10-CM

## 2021-01-22 LAB
HGB BLD-MCNC: 13.6 G/DL (ref 11.7–15.7)
MAGNESIUM SERPL-MCNC: 2 MG/DL (ref 1.6–2.3)

## 2021-01-22 PROCEDURE — 83735 ASSAY OF MAGNESIUM: CPT | Performed by: INTERNAL MEDICINE

## 2021-01-22 PROCEDURE — 85018 HEMOGLOBIN: CPT | Performed by: INTERNAL MEDICINE

## 2021-01-22 PROCEDURE — 36415 COLL VENOUS BLD VENIPUNCTURE: CPT | Performed by: INTERNAL MEDICINE

## 2021-01-22 NOTE — LETTER
January 25, 2021      Rozina Bell   [address] [address]  [place] [address]        Dear ,    We are writing to inform you of your test results.    Normal magnesium. Not anemic.       Resulted Orders   **Hemoglobin FUTURE anytime   Result Value Ref Range    Hemoglobin 13.6 11.7 - 15.7 g/dL   Magnesium   Result Value Ref Range    Magnesium 2.0 1.6 - 2.3 mg/dL       If you have any questions or concerns, please call the clinic at the number listed above.       Sincerely,      Deisy Sharpe MD/vásuqez

## 2021-01-25 ENCOUNTER — TELEPHONE (OUTPATIENT)
Dept: ALLERGY | Facility: CLINIC | Age: 71
End: 2021-01-25

## 2021-01-25 NOTE — TELEPHONE ENCOUNTER
Called patient to follow-up on potential exposures to polyethylene glycol or polysorbate. She had a colonoscopy many years ago however never had another one because it was a bad experience for her. She experienced a lot of pain after the IV was placed and she was given medication for sedation and her blood pressure became quite elevated. She also had a lot of diarrhea after the procedure. She doesn't specifically recall the prep that she took but believes that she did not have any particular issue with it. She has never taken Miralax. She does use lubricating eye drops - Refresh Plus preservative free that does not contain PEG or polysorbate.

## 2021-02-09 ENCOUNTER — TELEPHONE (OUTPATIENT)
Dept: ALLERGY | Facility: CLINIC | Age: 71
End: 2021-02-09

## 2021-02-09 ENCOUNTER — TELEPHONE (OUTPATIENT)
Dept: INTERNAL MEDICINE | Facility: CLINIC | Age: 71
End: 2021-02-09

## 2021-02-09 NOTE — TELEPHONE ENCOUNTER
Patient would like to know what Dr. Sharpe's opinion is about patient getting the COVID shot.    Patient requested that her PCP be changed to Dr. Moon and this was done.  Emy Tolbert,

## 2021-02-09 NOTE — TELEPHONE ENCOUNTER
Patient called and wants to know if she should be allergy tested before she gets the COVID shot or what the plan might be for when she gets the shot and who would give the shot?      Please call her back at [phone], to discuss further.  Emy Tolbetr,

## 2021-02-09 NOTE — LETTER
2/9/2021      Rozina Bell  [address] [address]  [place] [address]          Dear Rozina,    We have been tried to reach you by phone, but have been unable to connect with you. Dr. Bennett wanted us to reach out to you to let you know that you should have allergy testing prior to receiving the COVID vaccine. This testing should include the injectable steroids that you reacted to after your knee injections as well as medications that contain ingredients that are similar to those that are included the current COVID vaccines. We have ordered these medications, and have them available in the clinic to do this type of testing for you. You should plan to be in the clinic for approximately 2 hours for the testing. You should avoid taking antihistamines or over the counter sleep aids or cough or cold medications for 7 days prior to the appointment.     Please reach out to us directly in the allergy department to get this appointment scheduled. You can reach us on Monday, Tuesday and Thursdays from the hours of 7am to 3pm at [phone].    Thank you.            Sincerely,        Your [facility] Allergy Team

## 2021-02-16 NOTE — TELEPHONE ENCOUNTER
Please call patient - she should have allergy testing prior to receiving the COVID vaccine. This testing would include the injectable steroids she reacted to after her knee injection as well as medications that contain ingredients that are similar to those included in the currently available COVID vaccines. The medications have been ordered and should arrive in the clinic sometime this week. She can be scheduled for testing and should plan to be in the clinic for approximately 2 hours. She should avoid taking antihistamines or over the counter sleep aids or cough or cold medications for 7 days prior to this appointment.

## 2021-02-16 NOTE — CONFIDENTIAL NOTE
RN left message for patient to return call to RN's direct line @ [phone].    Catherine Hunt, RN

## 2021-02-19 NOTE — TELEPHONE ENCOUNTER
I could not get ahold of RN. Patient returned call. She was informed of below and scheduled with Dr Bennett on 3-8.

## 2021-02-22 ENCOUNTER — TELEPHONE (OUTPATIENT)
Dept: FAMILY MEDICINE | Facility: CLINIC | Age: 71
End: 2021-02-22

## 2021-02-22 NOTE — TELEPHONE ENCOUNTER
PT called to ask if she could get tested to see if she can use her EPINEPHrine (ANY BX GENERIC EQUIV) 0.3 MG/0.3ML injection. As pt is Allergic to Sulfa drugs.    PT has an appt 3//8 with an allergist  And was hoping this could be added. Plz call pt and let her know if this is possible.   Thank you!      Plz call home [phone]

## 2021-02-23 NOTE — TELEPHONE ENCOUNTER
Returned call to patient. Advised that after speaking with Dr. Bennett, there is no risk or interaction for the patient to be concerned with in using her EpiPen, even with her allergy to Sulfa drugs. Advised this is not something that we would need to do any type of testing for.     Patient states that she is unsure how to use her EpiPen. Advised patient that we can teach her how to use this at her clinic visit.     Mini TURNER MA

## 2021-03-01 ENCOUNTER — TRANSFERRED RECORDS (OUTPATIENT)
Dept: HEALTH INFORMATION MANAGEMENT | Facility: CLINIC | Age: 71
End: 2021-03-01

## 2021-03-01 LAB — RETINOPATHY: NORMAL

## 2021-03-08 ENCOUNTER — OFFICE VISIT (OUTPATIENT)
Dept: ALLERGY | Facility: CLINIC | Age: 71
End: 2021-03-08
Payer: MEDICARE

## 2021-03-08 VITALS — OXYGEN SATURATION: 99 % | DIASTOLIC BLOOD PRESSURE: 72 MMHG | SYSTOLIC BLOOD PRESSURE: 138 MMHG | HEART RATE: 72 BPM

## 2021-03-08 DIAGNOSIS — T50.905A ADVERSE EFFECT OF DRUG, INITIAL ENCOUNTER: Primary | ICD-10-CM

## 2021-03-08 PROCEDURE — 95018 ALL TSTG PERQ&IQ DRUGS/BIOL: CPT | Performed by: ALLERGY & IMMUNOLOGY

## 2021-03-08 PROCEDURE — 99207 PR DROP WITH A PROCEDURE: CPT | Performed by: ALLERGY & IMMUNOLOGY

## 2021-03-08 NOTE — LETTER
3/8/2021         RE: Rozina Bell  [address] [address]  [facility] [address]        Dear Colleague,    Thank you for referring your patient, Rozina Bell, to the [facility]. Please see a copy of my visit note below.    Rozina Bell was seen in the Allergy Clinic at [facility].      Rozina Bell is a 70 year old American female who is seen today for allergy testing. She has been feeling well and has not taken antihistamines in the past 7 days.     Past Medical History:   Diagnosis Date     Allergies      Electrolyte disturbance     h/o since had a pcn reaction     Endometriosis      Fibromyalgia      Flat feet      Hypomagnesemia      Hypothyroidism      Osteoarthritis     Rheum- Dr. Ferrer     Skin cancer 8/8/2012     Family History   Problem Relation Age of Onset     Heart Disease Mother      Arthritis Mother      Cardiovascular Mother      Thyroid Disease Mother      Hypertension Mother      Cerebrovascular Disease Mother      Gastrointestinal Disease Father      Cancer Father      Respiratory Father      Heart Disease Maternal Grandfather      Diabetes Paternal Grandfather      Cancer Brother      Diabetes Brother      Heart Disease Sister      Neurologic Disorder Sister         EPILEPSY     Allergies Son      Heart Disease Brother         TRIPLE BYPASS     Lipids Brother      Hypertension Brother      Chronic Obstructive Pulmonary Disease Brother      Glaucoma No family hx of      Macular Degeneration No family hx of      Social History     Tobacco Use     Smoking status: Never Smoker     Smokeless tobacco: Never Used     Tobacco comment: Lives in smoke free household   Substance Use Topics     Alcohol use: No     Drug use: No     Social History     Social History Narrative     Not on file       Past medical, family, and social history were reviewed.    REVIEW OF SYSTEMS:  General: negative for weight gain. negative for weight loss. negative for  changes in sleep.   Eyes: negative for itching. negative for redness. negative for tearing/watering. negative for vision changes  Ears: negative for fullness. negative for hearing loss. negative for dizziness.   Nose: negative for snoring.negative for changes in smell. negative for drainage.   Throat: negative for hoarseness. negative for sore throat. negative for trouble swallowing.   Lungs: negative for cough. negative for shortness of breath.negative for wheezing. negative for sputum production.   Cardiovascular: negative for chest pain. negative for swelling of ankles. negative for fast or irregular heartbeat.   Gastrointestinal: negative for nausea. negative for heartburn. negative for acid reflux.   Musculoskeletal: negative for joint pain. negative for joint stiffness. negative for joint swelling.   Neurologic: negative for seizures. negative for fainting. negative for weakness.   Psychiatric: negative for changes in mood. negative for anxiety.   Endocrine: negative for cold intolerance. negative for heat intolerance. negative for tremors.   Hematologic: negative for easy bruising. negative for easy bleeding.  Integumentary: negative for rash. negative for scaling. negative for nail changes.       Current Outpatient Medications:      Ascorbic Acid (VITAMIN C PO), Take 500 mg by mouth daily, Disp: , Rfl:      CALCIUM + D 600-200 MG-UNIT OR TABS, 1 TABLET TWICE  DAILY, Disp: , Rfl:      Cholecalciferol (VITAMIN D) 1000 UNITS capsule, Take 2,000 Units by mouth daily, Disp: 180 capsule, Rfl: 4     EPINEPHrine (ANY BX GENERIC EQUIV) 0.3 MG/0.3ML injection 2-pack, Inject 0.3 mLs (0.3 mg) into the muscle as needed for anaphylaxis, Disp: 0.6 mL, Rfl: 3     GLUCOSAMINE CHONDROITIN OR TABS, 1 TABLET DAILY, Disp: , Rfl:      levothyroxine (SYNTHROID/LEVOTHROID) 50 MCG tablet, Take 1 tablet (50 mcg) by mouth daily, Disp: 90 tablet, Rfl: 4     magnesium oxide 400 MG CAPS, Take 1 tablet by mouth 3 times daily, Disp: 270  capsule, Rfl: 4     meloxicam (MOBIC) 15 MG tablet, Take 1 tablet (15 mg) by mouth daily, Disp: 90 tablet, Rfl: 4     MULTI-VITAMIN OR, None Entered, Disp: , Rfl:      VITAMIN E OR, 1 TABLET DAILY, Disp: , Rfl:   Allergies   Allergen Reactions     Amitriptyline      jittery     Celebrex [Celecoxib]      Corticosteroids      Epinephrine-Lidocaine-Na Metabisulfite Other (See Comments)     Palpitations, flushing     Latex      Morphine      Penicillins      Sulfa Drugs      Ultram [Tramadol Hcl]      Yellow Dye      Can't remember, but ends up in ER with reaction     Zithromax [Azithromycin]        EXAM:   /72 (BP Location: Right arm, Patient Position: Sitting, Cuff Size: Adult Large)   Pulse 72   SpO2 99%   GENERAL APPEARANCE: alert, cooperative and not in distress  SKIN: no rashes, no lesions  HEAD: atraumatic, normocephalic  EYES: lids and lashes normal, conjunctivae and sclerae clear, EOM full and intact  MUSCULOSKELETAL: no musculoskeletal defects are noted  NEURO: no focal deficits noted  PSYCH: does not appear depressed or anxious      WORKUP:  Skin testing    Skin testing was performed to several medications to evaluate for possible allergies to methylprednisolone acetate, dexamethasone, polyethylene glycol, and polysorbate. Skin prick and intradermal testing to these medications were all negative with appropriate reactions to controls. Please seen scanned testing sheet for further details.    ASSESSMENT/PLAN:  Rozina Bell is a 70 year old female here for allergy testing.    1. Adverse effect of drug, initial encounter - Skin testing today was negative for evidence of allergic sensitization to several medications. In light of this testing we discussed the safety of proceeding with COVID vaccination with any of the currently approved vaccines. Given the symptoms Rozina has experienced previously with methylprednisolone acetate (Depo-Medrol) she was advised that this should not be used in the  future.    - recommend proceeding with COVID vaccination - will plan to monitor in the allergy clinic for 30 minutes after her initial dose  - recommend continued avoidance of Depo-Medrol/methylprednisolone acetate - methylprednisolone succinate or alternative injectable steroids may be used as needed  - HC ALLG TEST PERQ & IC DRUG/BIOL IMMED REACT W/ I&R      Follow-up in the allergy clinic as needed      Thank you for allowing me to participate in the care of Rozina Bell.      Kathryn Bennett MD, [facility]  Allergy/Immunology  [facility]      Chart documentation done in part with Dragon Voice Recognition Software. Although reviewed after completion, some word and grammatical errors may remain.      Again, thank you for allowing me to participate in the care of your patient.        Sincerely,        Kathryn Bennett MD

## 2021-03-08 NOTE — PROGRESS NOTES
Rozina Bell was seen in the Allergy Clinic at [facility].      Rozina Bell is a 70 year old American female who is seen today for allergy testing. She has been feeling well and has not taken antihistamines in the past 7 days.     Past Medical History:   Diagnosis Date     Allergies      Electrolyte disturbance     h/o since had a pcn reaction     Endometriosis      Fibromyalgia      Flat feet      Hypomagnesemia      Hypothyroidism      Osteoarthritis     Rheum- Dr. Ferrer     Skin cancer 8/8/2012     Family History   Problem Relation Age of Onset     Heart Disease Mother      Arthritis Mother      Cardiovascular Mother      Thyroid Disease Mother      Hypertension Mother      Cerebrovascular Disease Mother      Gastrointestinal Disease Father      Cancer Father      Respiratory Father      Heart Disease Maternal Grandfather      Diabetes Paternal Grandfather      Cancer Brother      Diabetes Brother      Heart Disease Sister      Neurologic Disorder Sister         EPILEPSY     Allergies Son      Heart Disease Brother         TRIPLE BYPASS     Lipids Brother      Hypertension Brother      Chronic Obstructive Pulmonary Disease Brother      Glaucoma No family hx of      Macular Degeneration No family hx of      Social History     Tobacco Use     Smoking status: Never Smoker     Smokeless tobacco: Never Used     Tobacco comment: Lives in smoke free household   Substance Use Topics     Alcohol use: No     Drug use: No     Social History     Social History Narrative     Not on file       Past medical, family, and social history were reviewed.    REVIEW OF SYSTEMS:  General: negative for weight gain. negative for weight loss. negative for changes in sleep.   Eyes: negative for itching. negative for redness. negative for tearing/watering. negative for vision changes  Ears: negative for fullness. negative for hearing loss. negative for dizziness.   Nose: negative for snoring.negative for  changes in smell. negative for drainage.   Throat: negative for hoarseness. negative for sore throat. negative for trouble swallowing.   Lungs: negative for cough. negative for shortness of breath.negative for wheezing. negative for sputum production.   Cardiovascular: negative for chest pain. negative for swelling of ankles. negative for fast or irregular heartbeat.   Gastrointestinal: negative for nausea. negative for heartburn. negative for acid reflux.   Musculoskeletal: negative for joint pain. negative for joint stiffness. negative for joint swelling.   Neurologic: negative for seizures. negative for fainting. negative for weakness.   Psychiatric: negative for changes in mood. negative for anxiety.   Endocrine: negative for cold intolerance. negative for heat intolerance. negative for tremors.   Hematologic: negative for easy bruising. negative for easy bleeding.  Integumentary: negative for rash. negative for scaling. negative for nail changes.       Current Outpatient Medications:      Ascorbic Acid (VITAMIN C PO), Take 500 mg by mouth daily, Disp: , Rfl:      CALCIUM + D 600-200 MG-UNIT OR TABS, 1 TABLET TWICE  DAILY, Disp: , Rfl:      Cholecalciferol (VITAMIN D) 1000 UNITS capsule, Take 2,000 Units by mouth daily, Disp: 180 capsule, Rfl: 4     EPINEPHrine (ANY BX GENERIC EQUIV) 0.3 MG/0.3ML injection 2-pack, Inject 0.3 mLs (0.3 mg) into the muscle as needed for anaphylaxis, Disp: 0.6 mL, Rfl: 3     GLUCOSAMINE CHONDROITIN OR TABS, 1 TABLET DAILY, Disp: , Rfl:      levothyroxine (SYNTHROID/LEVOTHROID) 50 MCG tablet, Take 1 tablet (50 mcg) by mouth daily, Disp: 90 tablet, Rfl: 4     magnesium oxide 400 MG CAPS, Take 1 tablet by mouth 3 times daily, Disp: 270 capsule, Rfl: 4     meloxicam (MOBIC) 15 MG tablet, Take 1 tablet (15 mg) by mouth daily, Disp: 90 tablet, Rfl: 4     MULTI-VITAMIN OR, None Entered, Disp: , Rfl:      VITAMIN E OR, 1 TABLET DAILY, Disp: , Rfl:   Allergies   Allergen Reactions      Amitriptyline      jittery     Celebrex [Celecoxib]      Corticosteroids      Epinephrine-Lidocaine-Na Metabisulfite Other (See Comments)     Palpitations, flushing     Latex      Morphine      Penicillins      Sulfa Drugs      Ultram [Tramadol Hcl]      Yellow Dye      Can't remember, but ends up in ER with reaction     Zithromax [Azithromycin]        EXAM:   /72 (BP Location: Right arm, Patient Position: Sitting, Cuff Size: Adult Large)   Pulse 72   SpO2 99%   GENERAL APPEARANCE: alert, cooperative and not in distress  SKIN: no rashes, no lesions  HEAD: atraumatic, normocephalic  EYES: lids and lashes normal, conjunctivae and sclerae clear, EOM full and intact  MUSCULOSKELETAL: no musculoskeletal defects are noted  NEURO: no focal deficits noted  PSYCH: does not appear depressed or anxious      WORKUP:  Skin testing    Skin testing was performed to several medications to evaluate for possible allergies to methylprednisolone acetate, dexamethasone, polyethylene glycol, and polysorbate. Skin prick and intradermal testing to these medications were all negative with appropriate reactions to controls. Please seen scanned testing sheet for further details.    ASSESSMENT/PLAN:  Rozina Bell is a 70 year old female here for allergy testing.    1. Adverse effect of drug, initial encounter - Skin testing today was negative for evidence of allergic sensitization to several medications. In light of this testing we discussed the safety of proceeding with COVID vaccination with any of the currently approved vaccines. Given the symptoms Rozina has experienced previously with methylprednisolone acetate (Depo-Medrol) she was advised that this should not be used in the future.    - recommend proceeding with COVID vaccination - will plan to monitor in the allergy clinic for 30 minutes after her initial dose  - recommend continued avoidance of Depo-Medrol/methylprednisolone acetate - methylprednisolone succinate or  alternative injectable steroids may be used as needed  - HC ALLG TEST PERQ & IC DRUG/BIOL IMMED REACT W/ I&R      Follow-up in the allergy clinic as needed      Thank you for allowing me to participate in the care of Rozinaleida Bell.      Kathryn Bennett MD, FAAAAI  Allergy/Immunology  [facility]      Chart documentation done in part with Dragon Voice Recognition Software. Although reviewed after completion, some word and grammatical errors may remain.

## 2021-03-16 ENCOUNTER — OFFICE VISIT (OUTPATIENT)
Dept: ALLERGY | Facility: CLINIC | Age: 71
End: 2021-03-16
Payer: MEDICARE

## 2021-03-16 ENCOUNTER — IMMUNIZATION (OUTPATIENT)
Dept: NURSING | Facility: CLINIC | Age: 71
End: 2021-03-16
Payer: MEDICARE

## 2021-03-16 VITALS — SYSTOLIC BLOOD PRESSURE: 139 MMHG | DIASTOLIC BLOOD PRESSURE: 80 MMHG | HEART RATE: 64 BPM | OXYGEN SATURATION: 99 %

## 2021-03-16 DIAGNOSIS — T50.905D ADVERSE EFFECT OF DRUG, SUBSEQUENT ENCOUNTER: Primary | ICD-10-CM

## 2021-03-16 PROCEDURE — 91300 PR COVID VAC PFIZER DIL RECON 30 MCG/0.3 ML IM: CPT

## 2021-03-16 PROCEDURE — 0001A PR COVID VAC PFIZER DIL RECON 30 MCG/0.3 ML IM: CPT

## 2021-03-16 PROCEDURE — 99213 OFFICE O/P EST LOW 20 MIN: CPT | Performed by: ALLERGY & IMMUNOLOGY

## 2021-03-16 NOTE — LETTER
3/16/2021         RE: Rozina Bell  [address] [address]  [place] [address]        Dear Colleague,    Thank you for referring your patient, Rozina Bell, to the [facility]. Please see a copy of my visit note below.    Rozina Bell was seen in the Allergy Clinic at [facility].      Rozina Bell is a 70 year old American female who is seen today for monitoring after receiving her initial dose of the Pfizer COVID-19 vaccine. The dose was administered at 09:12AM. She had been feeling well prior to her visit this morning.     At 09:25 Rozina reported having post-nasal drainage and throat clearing. Upon further evaluation she stated that this is a typical symptom for her and often occurs in the mornings. It usually improves after she has drank coffee however she did not drink coffee as usual this morning.    At 09:50 Rozina reported that her face felt hot and flushed on the right and her right eyelid felt heavy and possibly droopy. On further evaluation she did not have any objective signs of of flushing or angioedema. Her physical exam was normal. No medications were administered and she continued to be monitored. Her vital signs remained stable.    At 10:10 Rozina reported no change in her symptoms and no new symptoms had developed. Her vitals remained stable and she was discharged home.      Past Medical History:   Diagnosis Date     Allergies      Electrolyte disturbance     h/o since had a pcn reaction     Endometriosis      Fibromyalgia      Flat feet      Hypomagnesemia      Hypothyroidism      Osteoarthritis     Rheum- Dr. Ferrer     Skin cancer 8/8/2012     Family History   Problem Relation Age of Onset     Heart Disease Mother      Arthritis Mother      Cardiovascular Mother      Thyroid Disease Mother      Hypertension Mother      Cerebrovascular Disease Mother      Gastrointestinal Disease Father      Cancer Father      Respiratory Father      Heart  Disease Maternal Grandfather      Diabetes Paternal Grandfather      Cancer Brother      Diabetes Brother      Heart Disease Sister      Neurologic Disorder Sister         EPILEPSY     Allergies Son      Heart Disease Brother         TRIPLE BYPASS     Lipids Brother      Hypertension Brother      Chronic Obstructive Pulmonary Disease Brother      Glaucoma No family hx of      Macular Degeneration No family hx of      Social History     Tobacco Use     Smoking status: Never Smoker     Smokeless tobacco: Never Used     Tobacco comment: Lives in smoke free household   Substance Use Topics     Alcohol use: No     Drug use: No     Social History     Social History Narrative     Not on file       Past medical, family, and social history were reviewed.    REVIEW OF SYSTEMS:  General: negative for weight gain. negative for weight loss. negative for changes in sleep.   Eyes: negative for itching. negative for redness. negative for tearing/watering. negative for vision changes  Ears: negative for fullness. negative for hearing loss. negative for dizziness.   Nose: negative for snoring.negative for changes in smell. negative for drainage.   Throat: negative for hoarseness. negative for sore throat. negative for trouble swallowing.   Lungs: negative for cough. negative for shortness of breath.negative for wheezing. negative for sputum production.   Cardiovascular: negative for chest pain. negative for swelling of ankles. negative for fast or irregular heartbeat.   Gastrointestinal: negative for nausea. negative for heartburn. negative for acid reflux.   Musculoskeletal: negative for joint pain. negative for joint stiffness. negative for joint swelling.   Neurologic: negative for seizures. negative for fainting. negative for weakness.   Psychiatric: negative for changes in mood. negative for anxiety.   Endocrine: negative for cold intolerance. negative for heat intolerance. negative for tremors.   Hematologic: negative for easy  bruising. negative for easy bleeding.  Integumentary: negative for rash. negative for scaling. negative for nail changes.       Current Outpatient Medications:      Ascorbic Acid (VITAMIN C PO), Take 500 mg by mouth daily, Disp: , Rfl:      CALCIUM + D 600-200 MG-UNIT OR TABS, 1 TABLET TWICE  DAILY, Disp: , Rfl:      Cholecalciferol (VITAMIN D) 1000 UNITS capsule, Take 2,000 Units by mouth daily, Disp: 180 capsule, Rfl: 4     EPINEPHrine (ANY BX GENERIC EQUIV) 0.3 MG/0.3ML injection 2-pack, Inject 0.3 mLs (0.3 mg) into the muscle as needed for anaphylaxis, Disp: 0.6 mL, Rfl: 3     GLUCOSAMINE CHONDROITIN OR TABS, 1 TABLET DAILY, Disp: , Rfl:      levothyroxine (SYNTHROID/LEVOTHROID) 50 MCG tablet, Take 1 tablet (50 mcg) by mouth daily, Disp: 90 tablet, Rfl: 4     magnesium oxide 400 MG CAPS, Take 1 tablet by mouth 3 times daily, Disp: 270 capsule, Rfl: 4     meloxicam (MOBIC) 15 MG tablet, Take 1 tablet (15 mg) by mouth daily, Disp: 90 tablet, Rfl: 4     MULTI-VITAMIN OR, None Entered, Disp: , Rfl:      VITAMIN E OR, 1 TABLET DAILY, Disp: , Rfl:   Allergies   Allergen Reactions     Depo-Medrol [Methylprednisolone]      Specifically Methylprednisolone acetate. May substitute with methylprednisolone succinate or other IV/IM steroid medication.     Amitriptyline      jittery     Celebrex [Celecoxib]      Epinephrine-Lidocaine-Na Metabisulfite Other (See Comments)     Palpitations, flushing     Latex      Morphine      Penicillins      Sulfa Drugs      Ultram [Tramadol Hcl]      Yellow Dye      Can't remember, but ends up in ER with reaction     Zithromax [Azithromycin]        EXAM:   /78 (BP Location: Right arm, Patient Position: Sitting, Cuff Size: Adult Large)   Pulse 77   SpO2 97%   GENERAL APPEARANCE: alert, cooperative and not in distress  SKIN: no rashes, no lesions  HEAD: atraumatic, normocephalic  EYES: lids and lashes normal, conjunctivae and sclerae clear, EOM full and intact  ENT: no scars or  lesions, tongue midline and normal, soft palate, uvula, and tonsils normal  NECK: no asymmetry, masses, or scars  LUNGS: unlabored respirations, no intercostal retractions or accessory muscle use, clear to auscultation without rales or wheezes  HEART: regular rate and rhythm without murmurs and normal S1 and S2  MUSCULOSKELETAL: no musculoskeletal defects are noted  NEURO: no focal deficits noted  PSYCH: does not appear depressed or anxious      WORKUP:  None    ASSESSMENT/PLAN:  Rozina Bell is a 70 year old female seen for monitoring after receiving her initial Pfizer COVID-19 vaccine.    1. Adverse effect of drug, subsequent encounter - Rozina was monitored in the allergy clinic for 60 minutes after vaccination today. She reported unilateral flushing and eyelid heaviness 25 minutes after vaccine administration however she had no objective findings of an allergic reaction. No medications were administered and her symptoms resolved.    - no evidence of IgE mediated allergic reaction, recommend proceeding with second dose of vaccination as scheduled      Thank you for allowing me to participate in the care of Rozina Bell.      Kathryn Bennett MD, FAAAAI  Allergy/Immunology  [facility]      Chart documentation done in part with Dragon Voice Recognition Software. Although reviewed after completion, some word and grammatical errors may remain.        Again, thank you for allowing me to participate in the care of your patient.        Sincerely,        Kathryn Bennett MD

## 2021-03-16 NOTE — PROGRESS NOTES
Rozina Bell was seen in the Allergy Clinic at [facility].      Rozina Bell is a 70 year old American female who is seen today for monitoring after receiving her initial dose of the Pfizer COVID-19 vaccine. The dose was administered at 09:12AM. She had been feeling well prior to her visit this morning.     At 09:25 Rozina reported having post-nasal drainage and throat clearing. Upon further evaluation she stated that this is a typical symptom for her and often occurs in the mornings. It usually improves after she has drank coffee however she did not drink coffee as usual this morning.    At 09:50 Rozina reported that her face felt hot and flushed on the right and her right eyelid felt heavy and possibly droopy. On further evaluation she did not have any objective signs of of flushing or angioedema. Her physical exam was normal. No medications were administered and she continued to be monitored. Her vital signs remained stable.    At 10:10 Rozina reported no change in her symptoms and no new symptoms had developed. Her vitals remained stable and she was discharged home.      Past Medical History:   Diagnosis Date     Allergies      Electrolyte disturbance     h/o since had a pcn reaction     Endometriosis      Fibromyalgia      Flat feet      Hypomagnesemia      Hypothyroidism      Osteoarthritis     Rheum- Dr. Ferrer     Skin cancer 8/8/2012     Family History   Problem Relation Age of Onset     Heart Disease Mother      Arthritis Mother      Cardiovascular Mother      Thyroid Disease Mother      Hypertension Mother      Cerebrovascular Disease Mother      Gastrointestinal Disease Father      Cancer Father      Respiratory Father      Heart Disease Maternal Grandfather      Diabetes Paternal Grandfather      Cancer Brother      Diabetes Brother      Heart Disease Sister      Neurologic Disorder Sister         EPILEPSY     Allergies Son      Heart Disease Brother         TRIPLE BYPASS      Lipids Brother      Hypertension Brother      Chronic Obstructive Pulmonary Disease Brother      Glaucoma No family hx of      Macular Degeneration No family hx of      Social History     Tobacco Use     Smoking status: Never Smoker     Smokeless tobacco: Never Used     Tobacco comment: Lives in smoke free household   Substance Use Topics     Alcohol use: No     Drug use: No     Social History     Social History Narrative     Not on file       Past medical, family, and social history were reviewed.    REVIEW OF SYSTEMS:  General: negative for weight gain. negative for weight loss. negative for changes in sleep.   Eyes: negative for itching. negative for redness. negative for tearing/watering. negative for vision changes  Ears: negative for fullness. negative for hearing loss. negative for dizziness.   Nose: negative for snoring.negative for changes in smell. negative for drainage.   Throat: negative for hoarseness. negative for sore throat. negative for trouble swallowing.   Lungs: negative for cough. negative for shortness of breath.negative for wheezing. negative for sputum production.   Cardiovascular: negative for chest pain. negative for swelling of ankles. negative for fast or irregular heartbeat.   Gastrointestinal: negative for nausea. negative for heartburn. negative for acid reflux.   Musculoskeletal: negative for joint pain. negative for joint stiffness. negative for joint swelling.   Neurologic: negative for seizures. negative for fainting. negative for weakness.   Psychiatric: negative for changes in mood. negative for anxiety.   Endocrine: negative for cold intolerance. negative for heat intolerance. negative for tremors.   Hematologic: negative for easy bruising. negative for easy bleeding.  Integumentary: negative for rash. negative for scaling. negative for nail changes.       Current Outpatient Medications:      Ascorbic Acid (VITAMIN C PO), Take 500 mg by mouth daily, Disp: , Rfl:      CALCIUM + D  600-200 MG-UNIT OR TABS, 1 TABLET TWICE  DAILY, Disp: , Rfl:      Cholecalciferol (VITAMIN D) 1000 UNITS capsule, Take 2,000 Units by mouth daily, Disp: 180 capsule, Rfl: 4     EPINEPHrine (ANY BX GENERIC EQUIV) 0.3 MG/0.3ML injection 2-pack, Inject 0.3 mLs (0.3 mg) into the muscle as needed for anaphylaxis, Disp: 0.6 mL, Rfl: 3     GLUCOSAMINE CHONDROITIN OR TABS, 1 TABLET DAILY, Disp: , Rfl:      levothyroxine (SYNTHROID/LEVOTHROID) 50 MCG tablet, Take 1 tablet (50 mcg) by mouth daily, Disp: 90 tablet, Rfl: 4     magnesium oxide 400 MG CAPS, Take 1 tablet by mouth 3 times daily, Disp: 270 capsule, Rfl: 4     meloxicam (MOBIC) 15 MG tablet, Take 1 tablet (15 mg) by mouth daily, Disp: 90 tablet, Rfl: 4     MULTI-VITAMIN OR, None Entered, Disp: , Rfl:      VITAMIN E OR, 1 TABLET DAILY, Disp: , Rfl:   Allergies   Allergen Reactions     Depo-Medrol [Methylprednisolone]      Specifically Methylprednisolone acetate. May substitute with methylprednisolone succinate or other IV/IM steroid medication.     Amitriptyline      jittery     Celebrex [Celecoxib]      Epinephrine-Lidocaine-Na Metabisulfite Other (See Comments)     Palpitations, flushing     Latex      Morphine      Penicillins      Sulfa Drugs      Ultram [Tramadol Hcl]      Yellow Dye      Can't remember, but ends up in ER with reaction     Zithromax [Azithromycin]        EXAM:   /78 (BP Location: Right arm, Patient Position: Sitting, Cuff Size: Adult Large)   Pulse 77   SpO2 97%   GENERAL APPEARANCE: alert, cooperative and not in distress  SKIN: no rashes, no lesions  HEAD: atraumatic, normocephalic  EYES: lids and lashes normal, conjunctivae and sclerae clear, EOM full and intact  ENT: no scars or lesions, tongue midline and normal, soft palate, uvula, and tonsils normal  NECK: no asymmetry, masses, or scars  LUNGS: unlabored respirations, no intercostal retractions or accessory muscle use, clear to auscultation without rales or wheezes  HEART: regular  rate and rhythm without murmurs and normal S1 and S2  MUSCULOSKELETAL: no musculoskeletal defects are noted  NEURO: no focal deficits noted  PSYCH: does not appear depressed or anxious      WORKUP:  None    ASSESSMENT/PLAN:  Rozina Bell is a 70 year old female seen for monitoring after receiving her initial Pfizer COVID-19 vaccine.    1. Adverse effect of drug, subsequent encounter - Rozina was monitored in the allergy clinic for 60 minutes after vaccination today. She reported unilateral flushing and eyelid heaviness 25 minutes after vaccine administration however she had no objective findings of an allergic reaction. No medications were administered and her symptoms resolved.    - no evidence of IgE mediated allergic reaction, recommend proceeding with second dose of vaccination as scheduled      Thank you for allowing me to participate in the care of Rozina Bell.      Kathryn Bennett MD, FAAAAI  Allergy/Immunology  [facility]      Chart documentation done in part with Dragon Voice Recognition Software. Although reviewed after completion, some word and grammatical errors may remain.

## 2021-04-06 ENCOUNTER — IMMUNIZATION (OUTPATIENT)
Dept: NURSING | Facility: CLINIC | Age: 71
End: 2021-04-06
Attending: FAMILY MEDICINE
Payer: MEDICARE

## 2021-04-06 PROCEDURE — 91300 PR COVID VAC PFIZER DIL RECON 30 MCG/0.3 ML IM: CPT

## 2021-04-06 PROCEDURE — 0002A PR COVID VAC PFIZER DIL RECON 30 MCG/0.3 ML IM: CPT

## 2021-08-19 ENCOUNTER — TELEPHONE (OUTPATIENT)
Dept: ALLERGY | Facility: CLINIC | Age: 71
End: 2021-08-19

## 2021-08-19 NOTE — TELEPHONE ENCOUNTER
Reason for call:  Other   Patient called regarding (reason for call): call back  Additional comments:  She wants to know when she can get her 3rd covid shot. (moe is giving them) and to discuss her allergies.     Phone number to reach patient:  Home number on file [phone] (home)    Best Time:   Any     Can we leave a detailed message on this number?  YES    Travel screening: Not Applicable

## 2021-08-23 NOTE — CONFIDENTIAL NOTE
RN left message for patient to return call to RN's direct line @ [phone].    Catherine Hunt, RN

## 2021-08-24 NOTE — TELEPHONE ENCOUNTER
Spoke with patient. Patient would like to schedule 3rd Covid vaccine administration with Dr. Bennett in office if possible. Advised patient that at this time we are not administering 3rd Covid vaccines with [facility]. Patient is due for 3rd vaccine in November as her 2nd dose was administered 03/16/21. Patient will call back at the beginning of November to schedule.     Catherine Hunt, BSN, RN

## 2021-08-31 ENCOUNTER — OFFICE VISIT (OUTPATIENT)
Dept: INTERNAL MEDICINE | Facility: CLINIC | Age: 71
End: 2021-08-31
Payer: MEDICARE

## 2021-08-31 VITALS
WEIGHT: 200 LBS | TEMPERATURE: 97.8 F | SYSTOLIC BLOOD PRESSURE: 132 MMHG | BODY MASS INDEX: 35.43 KG/M2 | DIASTOLIC BLOOD PRESSURE: 78 MMHG | HEART RATE: 64 BPM

## 2021-08-31 DIAGNOSIS — M48.061 SPINAL STENOSIS OF LUMBAR REGION, UNSPECIFIED WHETHER NEUROGENIC CLAUDICATION PRESENT: ICD-10-CM

## 2021-08-31 DIAGNOSIS — E66.01 MORBID OBESITY (H): ICD-10-CM

## 2021-08-31 DIAGNOSIS — Z79.1 NSAID LONG-TERM USE: ICD-10-CM

## 2021-08-31 DIAGNOSIS — E03.9 HYPOTHYROIDISM, UNSPECIFIED TYPE: ICD-10-CM

## 2021-08-31 DIAGNOSIS — R79.0 LOW MAGNESIUM LEVEL: ICD-10-CM

## 2021-08-31 DIAGNOSIS — E55.9 HYPOVITAMINOSIS D: ICD-10-CM

## 2021-08-31 DIAGNOSIS — I10 HYPERTENSION GOAL BP (BLOOD PRESSURE) < 140/90: ICD-10-CM

## 2021-08-31 DIAGNOSIS — R53.83 FATIGUE, UNSPECIFIED TYPE: Primary | ICD-10-CM

## 2021-08-31 LAB
ANION GAP SERPL CALCULATED.3IONS-SCNC: 1 MMOL/L (ref 3–14)
BUN SERPL-MCNC: 23 MG/DL (ref 7–30)
CALCIUM SERPL-MCNC: 9.3 MG/DL (ref 8.5–10.1)
CHLORIDE BLD-SCNC: 107 MMOL/L (ref 94–109)
CO2 SERPL-SCNC: 30 MMOL/L (ref 20–32)
CREAT SERPL-MCNC: 0.66 MG/DL (ref 0.52–1.04)
CRP SERPL-MCNC: <2.9 MG/L (ref 0–8)
DEPRECATED CALCIDIOL+CALCIFEROL SERPL-MC: 47 UG/L (ref 20–75)
ERYTHROCYTE [DISTWIDTH] IN BLOOD BY AUTOMATED COUNT: 13.1 % (ref 10–15)
ERYTHROCYTE [SEDIMENTATION RATE] IN BLOOD BY WESTERGREN METHOD: 27 MM/HR (ref 0–30)
GFR SERPL CREATININE-BSD FRML MDRD: 90 ML/MIN/1.73M2
GLUCOSE BLD-MCNC: 90 MG/DL (ref 70–99)
HCT VFR BLD AUTO: 38.2 % (ref 35–47)
HGB BLD-MCNC: 12.7 G/DL (ref 11.7–15.7)
MAGNESIUM SERPL-MCNC: 2 MG/DL (ref 1.6–2.3)
MCH RBC QN AUTO: 31.3 PG (ref 26.5–33)
MCHC RBC AUTO-ENTMCNC: 33.2 G/DL (ref 31.5–36.5)
MCV RBC AUTO: 94 FL (ref 78–100)
PLATELET # BLD AUTO: 221 10E3/UL (ref 150–450)
POTASSIUM BLD-SCNC: 5.2 MMOL/L (ref 3.4–5.3)
RBC # BLD AUTO: 4.06 10E6/UL (ref 3.8–5.2)
SODIUM SERPL-SCNC: 138 MMOL/L (ref 133–144)
TSH SERPL DL<=0.005 MIU/L-ACNC: 3.12 MU/L (ref 0.4–4)
VIT B12 SERPL-MCNC: 725 PG/ML (ref 193–986)
WBC # BLD AUTO: 3.9 10E3/UL (ref 4–11)

## 2021-08-31 PROCEDURE — 86200 CCP ANTIBODY: CPT | Performed by: INTERNAL MEDICINE

## 2021-08-31 PROCEDURE — 84443 ASSAY THYROID STIM HORMONE: CPT | Performed by: INTERNAL MEDICINE

## 2021-08-31 PROCEDURE — 86140 C-REACTIVE PROTEIN: CPT | Performed by: INTERNAL MEDICINE

## 2021-08-31 PROCEDURE — 99214 OFFICE O/P EST MOD 30 MIN: CPT | Performed by: INTERNAL MEDICINE

## 2021-08-31 PROCEDURE — 36415 COLL VENOUS BLD VENIPUNCTURE: CPT | Performed by: INTERNAL MEDICINE

## 2021-08-31 PROCEDURE — 82306 VITAMIN D 25 HYDROXY: CPT | Performed by: INTERNAL MEDICINE

## 2021-08-31 PROCEDURE — 82607 VITAMIN B-12: CPT | Performed by: INTERNAL MEDICINE

## 2021-08-31 PROCEDURE — 86038 ANTINUCLEAR ANTIBODIES: CPT | Performed by: INTERNAL MEDICINE

## 2021-08-31 PROCEDURE — 83735 ASSAY OF MAGNESIUM: CPT | Performed by: INTERNAL MEDICINE

## 2021-08-31 PROCEDURE — 85652 RBC SED RATE AUTOMATED: CPT | Performed by: INTERNAL MEDICINE

## 2021-08-31 PROCEDURE — 80048 BASIC METABOLIC PNL TOTAL CA: CPT | Performed by: INTERNAL MEDICINE

## 2021-08-31 PROCEDURE — 86431 RHEUMATOID FACTOR QUANT: CPT | Performed by: INTERNAL MEDICINE

## 2021-08-31 PROCEDURE — 85027 COMPLETE CBC AUTOMATED: CPT | Performed by: INTERNAL MEDICINE

## 2021-08-31 NOTE — PROGRESS NOTES
"    Assessment & Plan     Fatigue, unspecified type    For many years.   Her siblings have had some unusual dx in past, some rheum issues in family  Will screen:     - Basic metabolic panel; Future  - Vitamin B12; Future  - Vitamin D Deficiency; Future  - TSH with free T4 reflex; Future  - CRP, inflammation; Future  - ESR: Erythrocyte sedimentation rate; Future  - Anti Nuclear Haven IgG by IFA with Reflex; Future  - Cyclic Citrullinated Peptide Antibody IgG; Future  - Rheumatoid factor; Future  - Cortisol Cortisone Free 24 Hour Urine; Future    Hypothyroidism, unspecified type  Almost due to recheck   - TSH with free T4 reflex; Future    Hypertension goal BP (blood pressure) < 140/90  She claims low BP at home.   Her orthostatics here are normal  She only does a wrist pressure, too painful on her arm.   - Basic metabolic panel; Future    Spinal stenosis of lumbar region, unspecified whether neurogenic claudication present   this is severe along with dejenerative joint arthritis in knees, ankles and hands.  I think this can be exhausting.     Hypovitaminosis D     - Vitamin D Deficiency; Future    NSAID long-term use     - CBC with platelets; Future    Morbid obesity (H)   she has intentionally lost 25# this past year.     Low magnesium level   request recheck.    - Magnesium; Future      I spent a total of 30 minutes on the day of the visit.   Time spent doing chart review, history and exam, documentation and further activities per the note         BMI:   Estimated body mass index is 35.43 kg/m  as calculated from the following:    Height as of 11/13/20: 1.6 m (5' 3\").    Weight as of this encounter: 90.7 kg (200 lb).   Weight management plan: she has lost 25# via cutting out refined foods.          Return in about 6 months (around 2/28/2022) for Physical Exam.    Malgorzata Moon MD  [facility] " "[place]    ==========================================================  ================================================================      Subjective   Rozina is a 71 year old who presents for the following health issues     HPI     70 y/o F here for MATTIE and cc: \"Super tired lately\".  H/o DJD, Lumbar SS,  HTn, Hypothyroid, MO,        Discuss severe fatigue     Has lost weight (25# since 2020), she stopped eating refined foods.. she thinks she lost weight intentionally due to her Lumbar spinal  Stenosis    Denies MDD symptoms.      Brother  recently of a rare disorder called \"Rare Acute Hemophagocytic Syndrome\"   Has a sister with scleroderma.     She declines sleep study, she uses a \"sleep chair\" and her  does not ever hear snoring.     Requests magnesium check.     She states her BP at home are low.  Thinks her elevated/normal BP here in clinic are really \"whitecoat\">   Claims she drinks a lot of fluid, often in the form of coffee/latte..     Review of Systems   Constitutional, HEENT, cardiovascular, pulmonary, gi and gu systems are negative, except as otherwise noted.      Objective    /78 (BP Location: Other (Comment), Patient Position: Sitting, Cuff Size: Adult Regular)   Pulse 64   Temp 97.8  F (36.6  C) (Oral)   Wt 90.7 kg (200 lb)   BMI 35.43 kg/m    Body mass index is 35.43 kg/m .     Physical Exam     GENERAL: alert, no distress and over weight  EYES: Eyes grossly normal to inspection, PERRL and conjunctivae and sclerae normal  RESP: lungs clear to auscultation - no rales, rhonchi or wheezes  CV: regular rate and rhythm, normal S1 S2, no S3 or S4, no murmur, click or rub, no peripheral edema and peripheral pulses strong  ABDOMEN:  MO  MS: no gross musculoskeletal defects noted, no edema  MS: dejenerative joint arthritis changes knees, ankles   She wears orthotics   Using cane to ambulate  NEURO: diminished strength and tone due to dejenerative joint arthritis. , mentation intact and " speech normal  PSYCH: mentation appears normal, affect normal/bright

## 2021-09-01 ENCOUNTER — NURSE TRIAGE (OUTPATIENT)
Dept: FAMILY MEDICINE | Facility: CLINIC | Age: 71
End: 2021-09-01

## 2021-09-01 DIAGNOSIS — R07.89 ATYPICAL CHEST PAIN: Primary | ICD-10-CM

## 2021-09-01 LAB
ANA SER QL IF: NEGATIVE
CCP AB SER IA-ACNC: <0.4 U/ML
RHEUMATOID FACT SER NEPH-ACNC: <7 IU/ML

## 2021-09-01 NOTE — TELEPHONE ENCOUNTER
Taken together, all the lab results do not point to any etiology.  Basically wnl.   Let's launch a cardiac work up:  Stress Echo.  Is she able to walk on a treadmill?

## 2021-09-01 NOTE — TELEPHONE ENCOUNTER
Patient called the clinic.  She reports increased lightheaded/dizziness, weakness to her lower extremities (with standing and ambulation), facial burning (whole face), increased fatigue and upset stomach.  Patient rates dizziness as moderate as she is afraid of falling.  Patient reports that she does not like the taste of water and drinks a lot of coffee with skim milk, diet pop and tea throughout the day.  Patient reports feeling thirst a lot most of the time due to her current medications (Thyroid and steroid).    Patient reports that these symptoms having been on-going for several weeks and today they are more noticeable.  Patient states that she forgot to mention these symptoms during her appointment 8/31/21.  Patient denies any other symptoms or concerns at this time.    2.  Patient is inquiring about her lab results:  The Rheumatoid factor is lower than the previous level.    Ref Range & Units 1 d ago      Rheumatoid Factor <20 IU/mL <7     What does this mean and would the results be related to her symptoms?   Anion Gap 3 - 14 mmol/L 1Low         Patient was advised to call the clinic [phone] or seek medical assistance UC or ER, if the symptoms persist or worsen and will be called with the provider's recommendations.    Patient would like a call back on her cellphone: [phone]      Reason for Disposition    Taking a medicine that could cause dizziness (e.g., blood pressure medications, diuretics)    Additional Information    Negative: Shock suspected (e.g., cold/pale/clammy skin, too weak to stand, low BP, rapid pulse)    Negative: Difficult to awaken or acting confused (e.g., disoriented, slurred speech)    Negative: Fainted, and still feels dizzy afterwards    Negative: Severe difficulty breathing (e.g., struggling for each breath, speaks in single words)    Negative: Overdose (accidental or intentional) of medications    Negative: New neurologic deficit that is present now: * Weakness of the face,  arm, or leg on one side of the body * Numbness of the face, arm, or leg on one side of the body * Loss of speech or garbled speech    Negative: Heart beating < 50 beats per minute OR > 140 beats per minute    Negative: Sounds like a life-threatening emergency to the triager    Negative: Chest pain    Negative: Rectal bleeding, bloody stool, or tarry-black stool    Negative: Vomiting is the main symptom    Negative: Diarrhea is the main symptom    Negative: Headache is the main symptom    Negative: Heat exhaustion suspected (i.e., dehydration from heat exposure)    Negative: Patient states that he/she is having an anxiety/panic attack    Negative: SEVERE dizziness (e.g., unable to stand, requires support to walk, feels like passing out now)    Negative: SEVERE headache or neck pain    Negative: Spinning or tilting sensation (vertigo) present now and one or more stroke risk factors (i.e., hypertension, diabetes, prior stroke/TIA, heart attack, age over 60) (Exception: prior physician evaluation for this AND no different/worse than usual)    Negative: Loss of vision or double vision    Negative: Extra heart beats OR irregular heart beating (i.e., 'palpitations')    Negative: Difficulty breathing    Negative: Drinking very little and has signs of dehydration (e.g., no urine > 12 hours, very dry mouth, very lightheaded)    Negative: Follows bleeding (e.g., stomach, rectum, vagina) (Exception: became dizzy from sight of small amount blood)    Negative: Patient sounds very sick or weak to the triager    Negative: Lightheadedness (dizziness) present now, after 2 hours of rest and fluids    Negative: Spinning or tilting sensation (vertigo) present now    Negative: Fever > 103 F (39.4 C)    Negative: Fever > 100.0 F (37.8 C) and has diabetes mellitus or a weak immune system (e.g., HIV positive, cancer chemotherapy, organ transplant, splenectomy, chronic steroids)    Negative: Vomiting occurs with dizziness    Negative:  "Patient wants to be seen    Answer Assessment - Initial Assessment Questions  1. DESCRIPTION: \"Describe your dizziness.\"      Increased dizziness, facial burning and fatigue.    2. LIGHTHEADED: \"Do you feel lightheaded?\" (e.g., somewhat faint, woozy, weak upon standing)      Woozy and weakness to lower extremities    3. VERTIGO: \"Do you feel like either you or the room is spinning or tilting?\" (i.e. vertigo)      Spinning    4. SEVERITY: \"How bad is it?\"  \"Do you feel like you are going to faint?\" \"Can you stand and walk?\"    - MILD - walking normally    - MODERATE - interferes with normal activities (e.g., work, school)     - SEVERE - unable to stand, requires support to walk, feels like passing out now.       Moderate    5. ONSET:  \"When did the dizziness begin?\"  On-going for several weeks    6. AGGRAVATING FACTORS: \"Does anything make it worse?\" (e.g., standing, change in head position)      Standing and ambulation    7. HEART RATE: \"Can you tell me your heart rate?\" \"How many beats in 15 seconds?\"  (Note: not all patients can do this)        No  8. CAUSE: \"What do you think is causing the dizziness?\"      Unknown    9. RECURRENT SYMPTOM: \"Have you had dizziness before?\" If so, ask: \"When was the last time?\" \"What happened that time?\"      Yes    10. OTHER SYMPTOMS: \"Do you have any other symptoms?\" (e.g., fever, chest pain, vomiting, diarrhea, bleeding)        No    Protocols used: DIZZINESS-A-OH      "

## 2021-09-01 NOTE — RESULT ENCOUNTER NOTE
Rozina Bell    Fatigue work up is negative:  the inflammatory labs/ rheumatoid labs are all negative/normal.  Blood counts are fine.  Electrolytes/kidney function look great.  Vitamin B12 & Vitamin D are normal.     Sincerely,       SUE SHERIDAN M.D.

## 2021-09-02 ENCOUNTER — APPOINTMENT (OUTPATIENT)
Dept: LAB | Facility: CLINIC | Age: 71
End: 2021-09-02
Payer: MEDICARE

## 2021-09-02 PROCEDURE — 99000 SPECIMEN HANDLING OFFICE-LAB: CPT | Performed by: INTERNAL MEDICINE

## 2021-09-02 PROCEDURE — 82530 CORTISOL FREE: CPT | Mod: 90 | Performed by: INTERNAL MEDICINE

## 2021-09-02 PROCEDURE — 82542 COL CHROMOTOGRAPHY QUAL/QUAN: CPT | Mod: 90 | Performed by: INTERNAL MEDICINE

## 2021-09-02 NOTE — TELEPHONE ENCOUNTER
Left message on answering machine for patient to call back to the nurse at [phone].    Kayleigh Howard RN  [facility]

## 2021-09-08 LAB
COLLECT DURATION TIME UR: 24 H
CORTIS 24H UR-MRATE: 16 MCG/24 H (ref 3.5–45)
CORTISONE 24H UR-MRATE: 54 MCG/24 H (ref 17–129)
SPECIMEN VOL 24H UR: 2975 ML

## 2021-09-26 ENCOUNTER — HEALTH MAINTENANCE LETTER (OUTPATIENT)
Age: 71
End: 2021-09-26

## 2021-10-11 ENCOUNTER — IMMUNIZATION (OUTPATIENT)
Dept: FAMILY MEDICINE | Facility: CLINIC | Age: 71
End: 2021-10-11
Payer: MEDICARE

## 2021-10-11 PROCEDURE — G0008 ADMIN INFLUENZA VIRUS VAC: HCPCS

## 2021-10-11 PROCEDURE — 90662 IIV NO PRSV INCREASED AG IM: CPT

## 2021-10-26 ENCOUNTER — TELEPHONE (OUTPATIENT)
Dept: ALLERGY | Facility: CLINIC | Age: 71
End: 2021-10-26

## 2021-10-26 NOTE — CONFIDENTIAL NOTE
Reason for Call:  Other call back and questions    Detailed comments: Patient has questions about covid and shingles vaccinations, can you please call back?    Phone Number Patient can be reached at: Home number on file [phone] (home)    Best Time: any    Can we leave a detailed message on this number? YES    Call taken on 10/26/2021 at 11:02 AM by Jalil Hughes

## 2021-10-26 NOTE — TELEPHONE ENCOUNTER
Called and spoke with patient. Patient would like to get covid booster and Shingrix vaccine. Patient is concerned that she is possibly allergic to Shingrix vaccine and would like to know if it is safe for her to get. Patient would also like to know how far apart the vaccines should be given. Forwarding to Dr. Bennett to advise.    Catherine Hunt, LAWRENCEN, RN

## 2021-10-26 NOTE — TELEPHONE ENCOUNTER
Spoke with Dr. Bennett. Advised patient to scheduled Covid booster and Shingrix vaccine 2-3 weeks apart. Patient verbalized understanding. No further action needed.     Catherine Hunt, BSN, RN

## 2021-12-14 ENCOUNTER — OFFICE VISIT (OUTPATIENT)
Dept: INTERNAL MEDICINE | Facility: CLINIC | Age: 71
End: 2021-12-14
Payer: MEDICARE

## 2021-12-14 VITALS
SYSTOLIC BLOOD PRESSURE: 133 MMHG | OXYGEN SATURATION: 100 % | HEART RATE: 66 BPM | DIASTOLIC BLOOD PRESSURE: 80 MMHG | BODY MASS INDEX: 35.44 KG/M2 | HEIGHT: 63 IN | TEMPERATURE: 97.8 F | WEIGHT: 200 LBS

## 2021-12-14 DIAGNOSIS — R35.0 URINE FREQUENCY: ICD-10-CM

## 2021-12-14 DIAGNOSIS — M48.061 SPINAL STENOSIS OF LUMBAR REGION, UNSPECIFIED WHETHER NEUROGENIC CLAUDICATION PRESENT: ICD-10-CM

## 2021-12-14 DIAGNOSIS — T78.40XS ALLERGY, SEQUELA: Primary | ICD-10-CM

## 2021-12-14 DIAGNOSIS — E55.9 HYPOVITAMINOSIS D: ICD-10-CM

## 2021-12-14 DIAGNOSIS — E03.9 HYPOTHYROIDISM, UNSPECIFIED TYPE: ICD-10-CM

## 2021-12-14 DIAGNOSIS — I10 HYPERTENSION GOAL BP (BLOOD PRESSURE) < 140/90: ICD-10-CM

## 2021-12-14 DIAGNOSIS — Z23 HIGH PRIORITY FOR 2019-NCOV VACCINE: ICD-10-CM

## 2021-12-14 DIAGNOSIS — Z79.1 NSAID LONG-TERM USE: ICD-10-CM

## 2021-12-14 DIAGNOSIS — E66.01 MORBID OBESITY (H): ICD-10-CM

## 2021-12-14 LAB
ALBUMIN UR-MCNC: NEGATIVE MG/DL
APPEARANCE UR: CLEAR
BILIRUB UR QL STRIP: NEGATIVE
COLOR UR AUTO: YELLOW
GLUCOSE UR STRIP-MCNC: NEGATIVE MG/DL
HGB UR QL STRIP: NEGATIVE
KETONES UR STRIP-MCNC: NEGATIVE MG/DL
LEUKOCYTE ESTERASE UR QL STRIP: NEGATIVE
NITRATE UR QL: NEGATIVE
PH UR STRIP: 6.5 [PH] (ref 5–7)
RBC #/AREA URNS AUTO: NORMAL /HPF
SP GR UR STRIP: 1.01 (ref 1–1.03)
UROBILINOGEN UR STRIP-ACNC: 0.2 E.U./DL
WBC #/AREA URNS AUTO: NORMAL /HPF

## 2021-12-14 PROCEDURE — 91300 COVID-19,PF,PFIZER (12+ YRS): CPT | Performed by: INTERNAL MEDICINE

## 2021-12-14 PROCEDURE — 0004A COVID-19,PF,PFIZER (12+ YRS): CPT | Performed by: INTERNAL MEDICINE

## 2021-12-14 PROCEDURE — 81001 URINALYSIS AUTO W/SCOPE: CPT | Performed by: INTERNAL MEDICINE

## 2021-12-14 PROCEDURE — 99215 OFFICE O/P EST HI 40 MIN: CPT | Performed by: INTERNAL MEDICINE

## 2021-12-14 ASSESSMENT — MIFFLIN-ST. JEOR: SCORE: 1388.38

## 2021-12-14 NOTE — PATIENT INSTRUCTIONS
Consider sleep study  -  Let me know if you change your mind.       Re visit the stress test:  Call and ask about positioning.    --   Call to schedule imaging       Call [facility] and ask about their pool.. let me know if you would like to consider referral.

## 2021-12-14 NOTE — PROGRESS NOTES
"  Assessment & Plan     Allergy, sequela  She feels she is \"allergic to everything\"  She is averse work ups due to fear of needing accomodatoins, fear of allergies \"what if I am allergic to the contrast? \"  I am not familiar with all of her barriers, but am asking her to work with the schedulers for sleep and cardiac work up ... see if we can complete evaluation for fatigue/.     Hypertension goal BP (blood pressure) < 140/90  Continue current management     Hypothyroidism, unspecified type  Continue current management     Spinal stenosis of lumbar region, unspecified whether neurogenic claudication present  This is very difficult to cope with and likely contributing to her fatigue and anxiety about testing     NSAID long-term use       Morbid obesity (H)   contributes to fatigue and poor mobility     Hypovitaminosis D       Urine frequency  *just check UA   JIC/    - UA with Microscopic reflex to Culture  - Urine Microscopic    High priority for 2019-nCoV vaccine     - COVID-19,PF,PFIZER (12+ Yrs PURPLE LABEL)      I spent a total of 40 minutes on the day of the visit.   Time spent doing chart review, history and exam, documentation and further activities per the note              Return in about 6 months (around 6/14/2022) for Physical Exam.    Malgorzata Moon MD  [facility] [place]    ==============================    ============================================    Subjective   Rozina is a 71 year old who presents for the following health issues     70 y/o F here for f/u       H/o DJD, Lumbar SS,  HTn, Hypothyroid, MO,   Last year, work up for fatigue is negative.         Patient has not scheduled stress test yet.  She is worried about her foot pain.   had allergies to many meds and is worried about     Declines sleep apnea for now ..  .  She uses a sleep chair due to mobility.   Is concerned the facility can't accommodate her.      Complains of frequency of urine.  \"always been that " "way\"    Complains of temperature intolerace    HPI     Discuss stress test   Fatigue  Hair loss     Review of Systems     Constitutional, HEENT, cardiovascular, pulmonary, gi and gu systems are negative, except as otherwise noted.      Objective    /80 (Patient Position: Sitting, Cuff Size: Adult Regular)   Pulse 66   Temp 97.8  F (36.6  C) (Oral)   Ht 1.588 m (5' 2.5\")   Wt 90.7 kg (200 lb)   SpO2 100%   BMI 36.00 kg/m    Body mass index is 36 kg/m .  Physical Exam   GENERAL: alert, no distress, obese, frail and fatigued  EYES: Eyes grossly normal to inspection, PERRL and conjunctivae and sclerae normal  MS: no gross musculoskeletal defects noted, no edema  MS   Antalgic gait.  Mobility strained due to unstable R ankle and dejenerative joint arthritis hips/back.   PSYCH: mentation appears normal, affect normal/bright    Results for orders placed or performed in visit on 12/14/21 (from the past 24 hour(s))   UA with Microscopic reflex to Culture    Specimen: Urine, Midstream   Result Value Ref Range    Color Urine Yellow Colorless, Straw, Light Yellow, Yellow    Appearance Urine Clear Clear    Glucose Urine Negative Negative mg/dL    Bilirubin Urine Negative Negative    Ketones Urine Negative Negative mg/dL    Specific Gravity Urine 1.010 1.003 - 1.035    Blood Urine Negative Negative    pH Urine 6.5 5.0 - 7.0    Protein Albumin Urine Negative Negative mg/dL    Urobilinogen Urine 0.2 0.2, 1.0 E.U./dL    Nitrite Urine Negative Negative    Leukocyte Esterase Urine Negative Negative   Urine Microscopic   Result Value Ref Range    RBC Urine 0-2 0-2 /HPF /HPF    WBC Urine 0-5 0-5 /HPF /HPF    Narrative    Urine Culture not indicated       Reviewed previous fatigue work up and answered questions.  No findings consistent with endocrine, inflammatory, hematologic, infectious condition.           "

## 2021-12-21 DIAGNOSIS — E03.9 HYPOTHYROIDISM, UNSPECIFIED TYPE: ICD-10-CM

## 2021-12-21 DIAGNOSIS — M48.00 SPINAL STENOSIS, UNSPECIFIED SPINAL REGION: ICD-10-CM

## 2021-12-21 RX ORDER — LEVOTHYROXINE SODIUM 50 UG/1
50 TABLET ORAL DAILY
Qty: 90 TABLET | Refills: 4 | Status: SHIPPED | OUTPATIENT
Start: 2021-12-21 | End: 2022-02-02

## 2021-12-21 RX ORDER — MELOXICAM 15 MG/1
15 TABLET ORAL DAILY
Qty: 90 TABLET | Refills: 4 | Status: SHIPPED | OUTPATIENT
Start: 2021-12-21 | End: 2022-02-02

## 2021-12-21 NOTE — TELEPHONE ENCOUNTER
Patient needs her levothyroxine (SYNTHROID/LEVOTHROID) 50 MCG tablet and her meloxicam (MOBIC) 15 MG tablet sent to [id number] - [place], [place] - [id number]  Esther Alford,

## 2022-01-16 ENCOUNTER — HEALTH MAINTENANCE LETTER (OUTPATIENT)
Age: 72
End: 2022-01-16

## 2022-02-02 ENCOUNTER — OFFICE VISIT (OUTPATIENT)
Dept: FAMILY MEDICINE | Facility: CLINIC | Age: 72
End: 2022-02-02
Payer: MEDICARE

## 2022-02-02 ENCOUNTER — NURSE TRIAGE (OUTPATIENT)
Dept: INTERNAL MEDICINE | Facility: CLINIC | Age: 72
End: 2022-02-02

## 2022-02-02 VITALS
WEIGHT: 204 LBS | BODY MASS INDEX: 36.72 KG/M2 | SYSTOLIC BLOOD PRESSURE: 128 MMHG | HEART RATE: 80 BPM | DIASTOLIC BLOOD PRESSURE: 74 MMHG | OXYGEN SATURATION: 99 % | TEMPERATURE: 98 F

## 2022-02-02 DIAGNOSIS — T50.905A ADVERSE EFFECT OF DRUG, INITIAL ENCOUNTER: ICD-10-CM

## 2022-02-02 DIAGNOSIS — E03.9 HYPOTHYROIDISM, UNSPECIFIED TYPE: ICD-10-CM

## 2022-02-02 DIAGNOSIS — M48.00 SPINAL STENOSIS, UNSPECIFIED SPINAL REGION: ICD-10-CM
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
## 2022-02-02 DIAGNOSIS — Z23 NEED FOR VACCINATION: ICD-10-CM

## 2022-02-02 PROCEDURE — 90714 TD VACC NO PRESV 7 YRS+ IM: CPT | Performed by: NURSE PRACTITIONER

## 2022-02-02 PROCEDURE — 99213 OFFICE O/P EST LOW 20 MIN: CPT | Mod: 25 | Performed by: NURSE PRACTITIONER

## 2022-02-02 PROCEDURE — 90471 IMMUNIZATION ADMIN: CPT | Performed by: NURSE PRACTITIONER

## 2022-02-02 RX ORDER — MELOXICAM 15 MG/1
15 TABLET ORAL DAILY
Qty: 90 TABLET | Refills: 1 | Status: SHIPPED | OUTPATIENT
Start: 2022-02-02 | End: 2022-08-24

## 2022-02-02 RX ORDER — DOXYCYCLINE 100 MG/1
100 CAPSULE ORAL 2 TIMES DAILY
Qty: 14 CAPSULE | Refills: 0 | Status: CANCELLED | OUTPATIENT
Start: 2022-02-02 | End: 2022-02-09

## 2022-02-02 RX ORDER — DOXYCYCLINE 100 MG/1
100 CAPSULE ORAL 2 TIMES DAILY
Qty: 14 CAPSULE | Refills: 0 | Status: SHIPPED | OUTPATIENT
Start: 2022-02-02 | End: 2022-02-09

## 2022-02-02 RX ORDER — METRONIDAZOLE 500 MG/1
500 TABLET ORAL 3 TIMES DAILY
Qty: 21 TABLET | Refills: 0 | Status: SHIPPED | OUTPATIENT
Start: 2022-02-02 | End: 2022-02-09

## 2022-02-02 RX ORDER — LEVOTHYROXINE SODIUM 50 UG/1
50 TABLET ORAL DAILY
Qty: 90 TABLET | Refills: 1 | Status: SHIPPED | OUTPATIENT
Start: 2022-02-02 | End: 2022-08-24

## 2022-02-02 RX ORDER — EPINEPHRINE 0.3 MG/.3ML
0.3 INJECTION SUBCUTANEOUS ONCE
Qty: 0.6 ML | Refills: 3 | Status: SHIPPED | OUTPATIENT
Start: 2022-02-02 | End: 2022-02-02

## 2022-02-02 NOTE — PROGRESS NOTES
Assessment & Plan     Cat bite, initial encounter  Start antibiotics- patient has several allergies including yellow dye allergy but has taken Doxycycline previously without a reaction.  - metroNIDAZOLE (FLAGYL) 500 MG tablet; Take 1 tablet (500 mg) by mouth 3 times daily for 7 days  - doxycycline monohydrate (MONODOX) 100 MG capsule; Take 1 capsule (100 mg) by mouth 2 times daily for 7 days    Adverse effect of drug, initial encounter  Refill sent to pharmacy  - EPINEPHrine (ANY BX GENERIC EQUIV) 0.3 MG/0.3ML injection 2-pack; Inject 0.3 mLs (0.3 mg) into the muscle once for 1 dose    Spinal stenosis, unspecified spinal region  Refills sent to new pharmacy  - meloxicam (MOBIC) 15 MG tablet; Take 1 tablet (15 mg) by mouth daily    Hypothyroidism, unspecified type  Refills sent to new pharmacy, due for labs in August.  - levothyroxine (SYNTHROID/LEVOTHROID) 50 MCG tablet; Take 1 tablet (50 mcg) by mouth daily    Need for vaccination        Review of the result(s) of each unique test - TSH  Ordering of each unique test  Prescription drug management         See Patient Instructions    Return in about 4 months (around 6/2/2022) for Wellness exam with PCP.    ESTEBAN Fuchs [facility] MARINA Handy is a 71 year old who presents for the following health issues  accompanied by her self.    HPI     Concern - Cat Bite    Onset: Swelling to left wrist started Monday  Description: Cat bite to left wrist   Intensity: mild  Progression of Symptoms:  worsening  Accompanying Signs & Symptoms: Redness, swelling and pain  Previous history of similar problem: none  Precipitating factors:        Worsened by: none  Alleviating factors:        Improved by: none  Therapies tried and outcome:  none     Patient has had associated chills, no fever.    Requests prescriptions send to new pharmacy.    Review of Systems   Constitutional, HEENT, cardiovascular, pulmonary, gi and gu systems are  negative, except as otherwise noted.      Objective    /74 (BP Location: Right arm, Patient Position: Sitting, Cuff Size: Adult Large)   Pulse 80   Temp 98  F (36.7  C) (Oral)   Wt 92.5 kg (204 lb)   SpO2 99%   BMI 36.72 kg/m    Body mass index is 36.72 kg/m .  Physical Exam   GENERAL: healthy, alert and no distress  SKIN: Left dorsal wrist with 2 scabbed puncture wounds and surrounding erythema, edema, warmth, tenderness  LYMPH: axillary: no adenopathy    No results found for any visits on 02/02/22.

## 2022-02-02 NOTE — LETTER
February 2, 2022      Rozina Bell  [address] [address]  [place] [address]        To whom it may concern:    Allergies   Allergen Reactions     Depo-Medrol [Methylprednisolone]      Specifically Methylprednisolone acetate. May substitute with methylprednisolone succinate or other IV/IM steroid medication.     Amitriptyline      jittery     Celebrex [Celecoxib]      Epinephrine-Lidocaine-Na Metabisulfite Other (See Comments)     Palpitations, flushing     Latex      Morphine      Pcn [Penicillins]      Sulfa Drugs      Ultram [Tramadol Hcl]      Yellow Dye      Can't remember, but ends up in ER with reaction     Zithromax [Azithromycin]          Sincerely,        Rossy Mcknight, ESTEBAN CNP

## 2022-02-02 NOTE — TELEPHONE ENCOUNTER
Pt calling. Left wrist swelling started Monday. Cat bit her near that spot. Has a burning and itch to it and is getting worse. Has more pressure in her hand. Hand feels numb. Has redness on top. Area is hot to the touch. Marked it to see if spreading and redness is now beyond that area. Has been feeling cold, but no fever. Temp 97.5.    Kayleigh Howard RN  [facility] [place]    Reason for Disposition    Bite looks infected (e.g., red area, red streak, pus, or fever)    Additional Information    Negative: Major bleeding (actively dripping or spurting) that can't be stopped    Negative: Sounds like a life-threatening emergency to the triager    Negative: Any break in skin from BITE (e.g., cut, puncture, or scratch) and WILD animal at RISK FOR RABIES (e.g., bat, raccoon, jones, skunk, coyote, other carnivores)    Negative: Any break in skin from BITE (e.g., cut, puncture, or scratch) and PET animal (e.g., dog, cat, or ferret) at risk for RABIES (e.g., sick, stray, unprovoked bite, developing country)    Negative: Any break in skin from BITE (e.g., cut, puncture, or scratch) and monkey    Negative: Cut (length > 1/8 inch or 3 mm) or skin tear and any animal    Negative: Bleeding won't stop after 10 minutes of direct pressure (using correct technique)    Negative: EXPOSURE of non-intact skin (e.g., exposed person has dermatitis, abrasion, wound) with animal BODY FLUID (e.g., saliva such as licking, blood, brain) and animal at high-risk for RABIES (e.g., bat, raccoon, jones, skunk, coyote, other carnivores)    Negative: Sounds like a serious bite injury to the triager    Negative: SEVERE pain (e.g., excruciating)    Negative: Puncture wound (hole through the skin) from cat (teeth or claws)    Negative: Puncture wound or small cut on face (Exception: puncture from small pet, such as a gerbil, mouse, hamster, puppy; or shallow scratches)    Negative: Puncture wound or small cut on hands or genitals (Exception:  puncture from small pet, such as a gerbil, mouse, hamster, puppy; or shallow scratches)    Negative: Puncture wound and weak immune system (e.g., HIV positive, cancer chemo, splenectomy, organ transplant, chronic steroids)    Protocols used: ANIMAL BITE-A-OH

## 2022-02-10 ENCOUNTER — TELEPHONE (OUTPATIENT)
Dept: FAMILY MEDICINE | Facility: CLINIC | Age: 72
End: 2022-02-10
Payer: MEDICARE

## 2022-02-10 NOTE — TELEPHONE ENCOUNTER
"Patient called to touch base regarding antibiotics and cat bite. Patient says she felt very ill on doxycycline and metronidazole \"like I was going to fall over\" dizzy lightheaded and nauseous. Cat bite did not have significant looking healing until past the weekend. \"During the day it looks completely gone but at night is looks a little red like a bruise\". Patient denies fever and feels that overall she is improving. RN encouraged patient to drink lots of fluids to flush antibiotics and try applying heat to wrist if color returns. RN advised patient to follow up right away if she starts running a fever or feeling more ill or if increased redness, swelling, heat. Patient plans to increase fluids and will follow up as needed.    Rossy Fernández MSN, BSN, RN on 2/10/2022 at 9:49 AM  [facility]    "

## 2022-03-01 ENCOUNTER — TRANSFERRED RECORDS (OUTPATIENT)
Dept: HEALTH INFORMATION MANAGEMENT | Facility: CLINIC | Age: 72
End: 2022-03-01

## 2022-03-01 LAB — RETINOPATHY: NORMAL

## 2022-05-19 ENCOUNTER — ALLIED HEALTH/NURSE VISIT (OUTPATIENT)
Dept: FAMILY MEDICINE | Facility: CLINIC | Age: 72
End: 2022-05-19
Payer: MEDICARE

## 2022-05-19 DIAGNOSIS — Z23 HIGH PRIORITY FOR 2019-NCOV VACCINE: Primary | ICD-10-CM

## 2022-05-19 PROCEDURE — 0054A COVID-19,PF,PFIZER (12+ YRS): CPT

## 2022-05-19 PROCEDURE — 99207 PR NO CHARGE LOS: CPT

## 2022-05-19 PROCEDURE — 91305 COVID-19,PF,PFIZER (12+ YRS): CPT

## 2022-08-22 DIAGNOSIS — M48.00 SPINAL STENOSIS, UNSPECIFIED SPINAL REGION: ICD-10-CM

## 2022-08-22 DIAGNOSIS — E03.9 HYPOTHYROIDISM, UNSPECIFIED TYPE: ICD-10-CM

## 2022-08-23 NOTE — TELEPHONE ENCOUNTER
"Routing refill request to provider for review/approval because:  Labs out of range:  CBC  Labs not current:  ALT/AST    Requested Prescriptions   Pending Prescriptions Disp Refills     levothyroxine (SYNTHROID/LEVOTHROID) 50 MCG tablet [Pharmacy Med Name: LEVOTHYROXINE 0.05MG (50MCG) TAB] 90 tablet 1     Sig: TAKE 1 TABLET(50 MCG) BY MOUTH DAILY       Thyroid Protocol Passed - 8/22/2022 10:53 AM        Passed - Patient is 12 years or older        Passed - Recent (12 mo) or future (30 days) visit within the authorizing provider's specialty     Patient has had an office visit with the authorizing provider or a provider within the authorizing providers department within the previous 12 mos or has a future within next 30 days. See \"Patient Info\" tab in inbasket, or \"Choose Columns\" in Meds & Orders section of the refill encounter.              Passed - Medication is active on med list        Passed - Normal TSH on file in past 12 months     Recent Labs   Lab Test 08/31/21  0914   TSH 3.12              Passed - No active pregnancy on record     If patient is pregnant or has had a positive pregnancy test, please check TSH.          Passed - No positive pregnancy test in past 12 months     If patient is pregnant or has had a positive pregnancy test, please check TSH.             meloxicam (MOBIC) 15 MG tablet [Pharmacy Med Name: MELOXICAM 15MG TABLETS] 90 tablet 1     Sig: TAKE 1 TABLET(15 MG) BY MOUTH DAILY       NSAID Medications Failed - 8/22/2022 10:53 AM        Failed - Normal ALT on file in past 12 months     Recent Labs   Lab Test 11/13/20  1452   ALT 33             Failed - Normal AST on file in past 12 months     Recent Labs   Lab Test 08/06/14  0829   AST 28             Failed - Patient is age 6-64 years        Failed - Normal CBC on file in past 12 months     Recent Labs   Lab Test 08/31/21  0914   WBC 3.9*   RBC 4.06   HGB 12.7   HCT 38.2                    Passed - Blood pressure under 140/90 in past 12 " "months     BP Readings from Last 3 Encounters:   02/02/22 128/74   12/14/21 133/80   08/31/21 132/78                 Passed - Recent (12 mo) or future (30 days) visit within the authorizing provider's specialty     Patient has had an office visit with the authorizing provider or a provider within the authorizing providers department within the previous 12 mos or has a future within next 30 days. See \"Patient Info\" tab in inbasket, or \"Choose Columns\" in Meds & Orders section of the refill encounter.              Passed - Medication is active on med list        Passed - No active pregnancy on record        Passed - Normal serum creatinine on file in past 12 months     Recent Labs   Lab Test 08/31/21  0914   CR 0.66       Ok to refill medication if creatinine is low          Passed - No positive pregnancy test in past 12 months           LINUS Leiva RN  [facility] [place]  Primary Care  "

## 2022-08-23 NOTE — TELEPHONE ENCOUNTER
Pt calling regarding refill request. Pt states she forgot to mention to nurse she spoke with previously that she would like to do any lab work needed at her wellness visit in October. States she normally has her electrolytes,  potassium, magnesium, kidneys, liver checked when in. Pt is requesting a 90 day supply and is requesting refill be addressed as soon as possible.     Kayleigh Howard RN  [facility]

## 2022-08-24 RX ORDER — MELOXICAM 15 MG/1
TABLET ORAL
Qty: 90 TABLET | Refills: 1 | Status: SHIPPED | OUTPATIENT
Start: 2022-08-24 | End: 2022-10-28

## 2022-08-24 RX ORDER — LEVOTHYROXINE SODIUM 50 UG/1
TABLET ORAL
Qty: 90 TABLET | Refills: 1 | Status: SHIPPED | OUTPATIENT
Start: 2022-08-24 | End: 2022-10-28

## 2022-09-15 ENCOUNTER — IMMUNIZATION (OUTPATIENT)
Dept: FAMILY MEDICINE | Facility: CLINIC | Age: 72
End: 2022-09-15
Payer: MEDICARE

## 2022-09-15 DIAGNOSIS — Z23 NEED FOR PROPHYLACTIC VACCINATION AND INOCULATION AGAINST INFLUENZA: Primary | ICD-10-CM

## 2022-09-15 PROCEDURE — 90662 IIV NO PRSV INCREASED AG IM: CPT

## 2022-09-15 PROCEDURE — 99207 PR NO CHARGE NURSE ONLY: CPT

## 2022-09-15 PROCEDURE — G0008 ADMIN INFLUENZA VIRUS VAC: HCPCS

## 2022-10-24 ENCOUNTER — TRANSFERRED RECORDS (OUTPATIENT)
Dept: HEALTH INFORMATION MANAGEMENT | Facility: CLINIC | Age: 72
End: 2022-10-24

## 2022-10-28 ENCOUNTER — OFFICE VISIT (OUTPATIENT)
Dept: INTERNAL MEDICINE | Facility: CLINIC | Age: 72
End: 2022-10-28
Payer: MEDICARE

## 2022-10-28 VITALS
TEMPERATURE: 97.7 F | OXYGEN SATURATION: 99 % | DIASTOLIC BLOOD PRESSURE: 70 MMHG | HEART RATE: 63 BPM | WEIGHT: 223 LBS | BODY MASS INDEX: 40.14 KG/M2 | SYSTOLIC BLOOD PRESSURE: 138 MMHG

## 2022-10-28 DIAGNOSIS — Z00.01 ENCOUNTER FOR GENERAL ADULT MEDICAL EXAMINATION WITH ABNORMAL FINDINGS: Primary | ICD-10-CM

## 2022-10-28 DIAGNOSIS — M48.00 SPINAL STENOSIS, UNSPECIFIED SPINAL REGION: ICD-10-CM

## 2022-10-28 DIAGNOSIS — Z23 HIGH PRIORITY FOR 2019-NCOV VACCINE: ICD-10-CM

## 2022-10-28 DIAGNOSIS — M48.061 SPINAL STENOSIS OF LUMBAR REGION, UNSPECIFIED WHETHER NEUROGENIC CLAUDICATION PRESENT: ICD-10-CM

## 2022-10-28 DIAGNOSIS — Z79.1 NSAID LONG-TERM USE: ICD-10-CM

## 2022-10-28 DIAGNOSIS — E03.9 HYPOTHYROIDISM, UNSPECIFIED TYPE: ICD-10-CM

## 2022-10-28 DIAGNOSIS — Z13.6 CARDIOVASCULAR SCREENING; LDL GOAL LESS THAN 160: ICD-10-CM

## 2022-10-28 DIAGNOSIS — E66.813 CLASS 3 SEVERE OBESITY DUE TO EXCESS CALORIES WITHOUT SERIOUS COMORBIDITY WITH BODY MASS INDEX (BMI) OF 40.0 TO 44.9 IN ADULT (H): ICD-10-CM

## 2022-10-28 DIAGNOSIS — E66.01 CLASS 3 SEVERE OBESITY DUE TO EXCESS CALORIES WITHOUT SERIOUS COMORBIDITY WITH BODY MASS INDEX (BMI) OF 40.0 TO 44.9 IN ADULT (H): ICD-10-CM

## 2022-10-28 DIAGNOSIS — I10 HYPERTENSION GOAL BP (BLOOD PRESSURE) < 140/90: ICD-10-CM

## 2022-10-28 LAB
CHOLEST SERPL-MCNC: 213 MG/DL
FASTING STATUS PATIENT QL REPORTED: YES
HDLC SERPL-MCNC: 85 MG/DL
LDLC SERPL CALC-MCNC: 120 MG/DL
NONHDLC SERPL-MCNC: 128 MG/DL
TRIGL SERPL-MCNC: 41 MG/DL
TSH SERPL DL<=0.005 MIU/L-ACNC: 3.26 MU/L (ref 0.4–4)

## 2022-10-28 PROCEDURE — 0124A COVID-19,PF,PFIZER BOOSTER BIVALENT: CPT | Performed by: INTERNAL MEDICINE

## 2022-10-28 PROCEDURE — 80061 LIPID PANEL: CPT | Performed by: INTERNAL MEDICINE

## 2022-10-28 PROCEDURE — G0439 PPPS, SUBSEQ VISIT: HCPCS | Performed by: INTERNAL MEDICINE

## 2022-10-28 PROCEDURE — 84443 ASSAY THYROID STIM HORMONE: CPT | Performed by: INTERNAL MEDICINE

## 2022-10-28 PROCEDURE — 36415 COLL VENOUS BLD VENIPUNCTURE: CPT | Performed by: INTERNAL MEDICINE

## 2022-10-28 PROCEDURE — 91312 COVID-19,PF,PFIZER BOOSTER BIVALENT: CPT | Performed by: INTERNAL MEDICINE

## 2022-10-28 RX ORDER — LEVOTHYROXINE SODIUM 50 UG/1
TABLET ORAL
Qty: 90 TABLET | Refills: 3 | Status: SHIPPED | OUTPATIENT
Start: 2022-10-28 | End: 2023-10-26

## 2022-10-28 RX ORDER — MELOXICAM 15 MG/1
15 TABLET ORAL DAILY
Qty: 90 TABLET | Refills: 3 | Status: SHIPPED | OUTPATIENT
Start: 2022-10-28 | End: 2023-10-26

## 2022-10-28 ASSESSMENT — ENCOUNTER SYMPTOMS
DYSURIA: 0
BREAST MASS: 0
CONSTIPATION: 0
HEMATURIA: 0
NERVOUS/ANXIOUS: 0
FEVER: 0
HEADACHES: 1
NAUSEA: 0
PALPITATIONS: 0
EYE PAIN: 0
PARESTHESIAS: 1
JOINT SWELLING: 1
HEMATOCHEZIA: 0
MYALGIAS: 1
HEARTBURN: 0
CHILLS: 0
SORE THROAT: 0
SHORTNESS OF BREATH: 0
DIZZINESS: 0
FREQUENCY: 1
WEAKNESS: 1
ARTHRALGIAS: 1
COUGH: 0
ABDOMINAL PAIN: 0
DIARRHEA: 0

## 2022-10-28 ASSESSMENT — ACTIVITIES OF DAILY LIVING (ADL): CURRENT_FUNCTION: NO ASSISTANCE NEEDED

## 2022-10-28 NOTE — Clinical Note
Please abstract the following data from this visit with this patient into the appropriate field in Epic:  Tests that can be patient reported without a hard copy:  Eye exam with ophthalmology on this date:  3/1/21 at [facility]  Other Tests found in the patient's chart through Chart Review/Care Everywhere:  Eye exam with ophthalmology on this date: 3/1/22  [facility]  Note to Abstraction: If this section is blank, no results were found via Chart Review/Care Everywhere.

## 2022-10-28 NOTE — RESULT ENCOUNTER NOTE
Rozina Bell    Cholesterol slightly increased from last year, but basically stable.  The thyroid level is perfect, no dose change needed.     Sincerely,       SUE SHERIDAN M.D.

## 2022-10-28 NOTE — PROGRESS NOTES
"SUBJECTIVE:   Rozina is a 72 year old who presents for Preventive Visit.    71 y/o F here for AFE.       H/o  DJD, Lumbar SS,  HTn, Hypothyroid, MO,       During 2021, work up for fatigue basically (-).  However she did not complete cardiac and sleep evaluations, worried about allergies//accommodatations ...     Last month, seen in ER for BPPV sx.   BMP&CBC normal     She lost 80# from 2018==>2020   Has gained 20# back        > is  .. she used to see Dr Yap for pain clinic.   She         Are you in the first 12 months of your Medicare coverage?  No    Healthy Habits:     In general, how would you rate your overall health?  Good    Frequency of exercise:  None    Do you usually eat at least 4 servings of fruit and vegetables a day, include whole grains    & fiber and avoid regularly eating high fat or \"junk\" foods?  Yes    Taking medications regularly:  Yes    Ability to successfully perform activities of daily living:  No assistance needed    Home Safety:  No safety concerns identified    Hearing Impairment:  No hearing concerns    In the past 6 months, have you been bothered by leaking of urine? Yes    In general, how would you rate your overall mental or emotional health?  Good      PHQ-2 Total Score: 0    Additional concerns today:  Yes    Do you feel safe in your environment? Yes    Have you ever done Advance Care Planning? (For example, a Health Directive, POLST, or a discussion with a medical provider or your loved ones about your wishes): Yes, patient states has an Advance Care Planning document and will bring a copy to the clinic.       Fall risk  Fallen 2 or more times in the past year?: No  Any fall with injury in the past year?: No    Cognitive Screening   1) Repeat 3 items (Leader, Season, Table)    2) Clock draw: NORMAL  3) 3 item recall:   Results: 3 items recalled: COGNITIVE IMPAIRMENT LESS LIKELY    Mini-CogTM Copyright S Liliane. Licensed by the author for use in [facility]; " reprinted with permission (soob@.[email or web address]). All rights reserved.      Do you have sleep apnea, excessive snoring or daytime drowsiness?: no    Reviewed and updated as needed this visit by clinical staff                  Reviewed and updated as needed this visit by Provider                 Social History     Tobacco Use     Smoking status: Never     Smokeless tobacco: Never     Tobacco comments:     Lives in smoke free household   Substance Use Topics     Alcohol use: No     If you drink alcohol do you typically have >3 drinks per day or >7 drinks per week? No    Alcohol Use 10/28/2022   Prescreen: >3 drinks/day or >7 drinks/week? Not Applicable   Prescreen: >3 drinks/day or >7 drinks/week? -           Discuss weight, pain clinic referral,     Current providers sharing in care for this patient include:   Patient Care Team:  Malgorzata Moon MD as PCP - General (Internal Medicine)  Jesse Mckeon MD as MD (Specialist)  Emmanuel Max MD as MD (Orthopedics)  Malgorzata Moon MD as Assigned PCP    The following health maintenance items are reviewed in Epic and correct as of today:  Health Maintenance   Topic Date Due     HEPATITIS B IMMUNIZATION (1 of 3 - 3-dose series) Never done     ZOSTER IMMUNIZATION (2 of 3) 11/18/2015     EYE EXAM  10/08/2019     MEDICARE ANNUAL WELLNESS VISIT  11/08/2020     COVID-19 Vaccine (5 - Booster for Pfizer series) 07/14/2022     TSH W/FREE T4 REFLEX  08/31/2022     ANNUAL REVIEW OF HM ORDERS  08/31/2022     FALL RISK ASSESSMENT  10/28/2023     COLORECTAL CANCER SCREENING  11/30/2023     MAMMO SCREENING  07/26/2024     LIPID  11/13/2025     ADVANCE CARE PLANNING  11/13/2025     DEXA  11/22/2031     DTAP/TDAP/TD IMMUNIZATION (8 - Td or Tdap) 02/02/2032     HEPATITIS C SCREENING  Completed     PHQ-2 (once per calendar year)  Completed     INFLUENZA VACCINE  Completed     Pneumococcal Vaccine: 65+ Years  Completed     IPV IMMUNIZATION  Aged Out     MENINGITIS  IMMUNIZATION  Aged Out     Lab work is in process  Labs reviewed in EPIC  BP Readings from Last 3 Encounters:   10/28/22 138/70   02/02/22 128/74   12/14/21 133/80    Wt Readings from Last 3 Encounters:   10/28/22 101.2 kg (223 lb)   02/02/22 92.5 kg (204 lb)   12/14/21 90.7 kg (200 lb)                  Patient Active Problem List   Diagnosis     Hypothyroidism     Allergic state     CARDIOVASCULAR SCREENING; LDL GOAL LESS THAN 160     Fibromyalgia     Obesity     AK (actinic keratosis)     Hypertension goal BP (blood pressure) < 140/90     Skin cancer     Advanced directives, counseling/discussion     Degenerative spondylolisthesis     Metatarsal fracture     Prepatellar bursitis     MMT (medial meniscus tear)     Lumbar spinal stenosis     Hypovitaminosis D     Primary osteoarthritis of both first carpometacarpal joints     Morbid obesity (H)     Hip pain, left     Family history of leukemia     Primary osteoarthritis of both ankles     Hypomagnesemia     Past Surgical History:   Procedure Laterality Date     BREAST BIOPSY, RT/LT  8/08    Breat Biopsy RT/LT     HC REMOVAL GALLBLADDER  age 31     HYSTERECTOMY, PAP NO LONGER INDICATED       ZZC VAG HYST,RMV TUBE/OVARY  age 34    endometriosis       Social History     Tobacco Use     Smoking status: Never     Smokeless tobacco: Never     Tobacco comments:     Lives in smoke free household   Substance Use Topics     Alcohol use: No     Family History   Problem Relation Age of Onset     Heart Disease Mother      Arthritis Mother      Cardiovascular Mother      Thyroid Disease Mother      Hypertension Mother      Cerebrovascular Disease Mother      Gastrointestinal Disease Father      Cancer Father      Respiratory Father      Heart Disease Maternal Grandfather      Diabetes Paternal Grandfather      Cancer Brother      Diabetes Brother      Heart Disease Sister      Neurologic Disorder Sister         EPILEPSY     Allergies Son      Heart Disease Brother         TRIPLE  BYPASS     Lipids Brother      Hypertension Brother      Chronic Obstructive Pulmonary Disease Brother      Glaucoma No family hx of      Macular Degeneration No family hx of          Current Outpatient Medications   Medication Sig Dispense Refill     Ascorbic Acid (VITAMIN C PO) Take 500 mg by mouth daily       CALCIUM + D 600-200 MG-UNIT OR TABS 1 TABLET TWICE  DAILY       Cholecalciferol (VITAMIN D) 1000 UNITS capsule Take 2,000 Units by mouth daily 180 capsule 4     GLUCOSAMINE CHONDROITIN OR TABS 1 TABLET DAILY       levothyroxine (SYNTHROID/LEVOTHROID) 50 MCG tablet TAKE 1 TABLET(50 MCG) BY MOUTH DAILY 90 tablet 1     magnesium oxide 400 MG CAPS Take 1 tablet by mouth 3 times daily 270 capsule 4     meloxicam (MOBIC) 15 MG tablet TAKE 1 TABLET(15 MG) BY MOUTH DAILY 90 tablet 1     MULTI-VITAMIN OR None Entered       VITAMIN E OR 1 TABLET DAILY       Allergies   Allergen Reactions     Depo-Medrol [Methylprednisolone]      Specifically Methylprednisolone acetate. May substitute with methylprednisolone succinate or other IV/IM steroid medication.     Amitriptyline      jittery     Celebrex [Celecoxib]      Epinephrine-Lidocaine-Na Metabisulfite Other (See Comments)     Palpitations, flushing     Latex      Morphine      Pcn [Penicillins]      Sulfa Drugs      Ultram [Tramadol Hcl]      Yellow Dye      Can't remember, but ends up in ER with reaction     Zithromax [Azithromycin]      Recent Labs   Lab Test 08/31/21  0914 11/13/20  1452 11/08/19  1017 10/29/18  0913   LDL  --  109* 102* 127*   HDL  --  71 69 68   TRIG  --  64 52 50   ALT  --  33  --   --    CR 0.66 0.64 0.62 0.73   GFRESTIMATED 90 >90 >90 80   GFRESTBLACK  --  >90 >90 >90   POTASSIUM 5.2 4.3 4.8 4.3   TSH 3.12 1.98 0.65 2.60               Review of Systems   Constitutional: Negative for chills and fever.   HENT: Negative for congestion, ear pain, hearing loss and sore throat.    Eyes: Negative for pain and visual disturbance.   Respiratory: Negative  "for cough and shortness of breath.    Cardiovascular: Positive for peripheral edema. Negative for chest pain and palpitations.   Gastrointestinal: Negative for abdominal pain, constipation, diarrhea, heartburn, hematochezia and nausea.   Breasts:  Negative for tenderness, breast mass and discharge.   Genitourinary: Positive for frequency and urgency. Negative for dysuria, genital sores, hematuria, pelvic pain, vaginal bleeding and vaginal discharge.   Musculoskeletal: Positive for arthralgias, joint swelling and myalgias.   Skin: Negative for rash.   Neurological: Positive for weakness, headaches and paresthesias. Negative for dizziness.   Psychiatric/Behavioral: Negative for mood changes. The patient is not nervous/anxious.           OBJECTIVE:   /70 (Patient Position: Sitting, Cuff Size: Adult Regular)   Pulse 63   Temp 97.7  F (36.5  C) (Oral)   Wt 101.2 kg (223 lb)   SpO2 99%   BMI 40.14 kg/m   Estimated body mass index is 36.72 kg/m  as calculated from the following:    Height as of 12/14/21: 1.588 m (5' 2.5\").    Weight as of 2/2/22: 92.5 kg (204 lb).     Physical Exam  GENERAL APPEARANCE: healthy, alert and no distress  EYES: Eyes grossly normal to inspection, PERRL and conjunctivae and sclerae normal  HENT: ear canals and TM's normal, nose and mouth without ulcers or lesions, oropharynx clear and oral mucous membranes moist  NECK: no adenopathy, no asymmetry, masses, or scars and thyroid normal to palpation  RESP: lungs clear to auscultation - no rales, rhonchi or wheezes  BREAST: declined   CV: regular rate and rhythm, normal S1 S2, no S3 or S4, no murmur, click or rub, no peripheral edema and peripheral pulses strong  ABDOMEN: soft, nontender, no hepatosplenomegaly, no masses and bowel sounds normal   (female): deferred   MS: no musculoskeletal defects are noted and gait is age appropriate without ataxia  SKIN: no suspicious lesions or rashes  NEURO: Normal strength and tone, sensory exam " "grossly normal, mentation intact and speech normal  PSYCH: mentation appears normal and affect normal/bright    Diagnostic Test Results:  Labs reviewed in care everywhere:  BMP and CBC normal last month  Chol and TSH are pending. .    ASSESSMENT / PLAN:   (Z00.01) Encounter for general adult medical examination with abnormal findings  (primary encounter diagnosis)  Comment:  Doing well.  Limited by dejenerative joint arthritis   Plan:      (I10) Hypertension goal BP (blood pressure) < 140/90  Comment:  Controlled. Used wrist cuff.   Plan:  Continue current management     (E03.9) Hypothyroidism, unspecified type  Comment:  Due to recheckc   Pending.   Plan: TSH with free T4 reflex, levothyroxine         (SYNTHROID/LEVOTHROID) 50 MCG tablet             (Z79.1) NSAID long-term use  Comment:  BMP and CBC nl last month   Plan:      (M48.061) Spinal stenosis of lumbar region, unspecified whether neurogenic claudication present  Comment: per [place]//Dr Mckeon.   Plan:      (E66.01,  Z68.41) Class 3 severe obesity due to excess calories without serious comorbidity with body mass index (BMI) of 40.0 to 44.9 in adult (H)  Comment: she has been very successful with weight loss   Plan:      (Z13.6) CARDIOVASCULAR SCREENING; LDL GOAL LESS THAN 160  Comment:     Plan: Lipid panel reflex to direct LDL Fasting             (M48.00) Spinal stenosis, unspecified spinal region  Comment:  See above   Plan: meloxicam (MOBIC) 15 MG tablet                     COUNSELING:       Healthy diet/nutrition    Estimated body mass index is 36.72 kg/m  as calculated from the following:    Height as of 12/14/21: 1.588 m (5' 2.5\").    Weight as of 2/2/22: 92.5 kg (204 lb).    Weight management plan: she is on weight loss plan, she has been successful    She reports that she has never smoked. She has never used smokeless tobacco.      Appropriate preventive services were discussed with this patient, including applicable screening as appropriate for " cardiovascular disease, diabetes, osteopenia/osteoporosis, and glaucoma.  As appropriate for age/gender, discussed screening for colorectal cancer, prostate cancer, breast cancer, and cervical cancer. Checklist reviewing preventive services available has been given to the patient.    Reviewed patients plan of care and provided an AVS. The Basic Care Plan (routine screening as documented in Health Maintenance) for Rozina meets the Care Plan requirement. This Care Plan has been established and reviewed with the Patient.    Counseling Resources:  ATP IV Guidelines  Pooled Cohorts Equation Calculator  Breast Cancer Risk Calculator  Breast Cancer: Medication to Reduce Risk  FRAX Risk Assessment  ICSI Preventive Guidelines  Dietary Guidelines for Americans, 2010  USDA's MyPlate  ASA Prophylaxis  Lung CA Screening    Malgorzata Moon MD  [facility] [place]    Identified Health Risks:

## 2023-01-13 ENCOUNTER — OFFICE VISIT (OUTPATIENT)
Dept: FAMILY MEDICINE | Facility: CLINIC | Age: 73
End: 2023-01-13
Payer: MEDICARE

## 2023-01-13 ENCOUNTER — ANCILLARY PROCEDURE (OUTPATIENT)
Dept: ULTRASOUND IMAGING | Facility: CLINIC | Age: 73
End: 2023-01-13
Attending: PHYSICIAN ASSISTANT
Payer: MEDICARE

## 2023-01-13 ENCOUNTER — NURSE TRIAGE (OUTPATIENT)
Dept: FAMILY MEDICINE | Facility: CLINIC | Age: 73
End: 2023-01-13

## 2023-01-13 VITALS
HEART RATE: 78 BPM | OXYGEN SATURATION: 98 % | HEIGHT: 63 IN | WEIGHT: 239 LBS | DIASTOLIC BLOOD PRESSURE: 72 MMHG | TEMPERATURE: 97.5 F | BODY MASS INDEX: 42.35 KG/M2 | SYSTOLIC BLOOD PRESSURE: 124 MMHG

## 2023-01-13 DIAGNOSIS — M25.561 RIGHT KNEE PAIN, UNSPECIFIED CHRONICITY: ICD-10-CM

## 2023-01-13 DIAGNOSIS — M79.661 PAIN OF RIGHT LOWER LEG: ICD-10-CM

## 2023-01-13 DIAGNOSIS — M79.661 PAIN OF RIGHT LOWER LEG: Primary | ICD-10-CM

## 2023-01-13 PROCEDURE — 93971 EXTREMITY STUDY: CPT | Mod: RT | Performed by: RADIOLOGY

## 2023-01-13 PROCEDURE — 99214 OFFICE O/P EST MOD 30 MIN: CPT | Performed by: PHYSICIAN ASSISTANT

## 2023-01-13 ASSESSMENT — ENCOUNTER SYMPTOMS
SHORTNESS OF BREATH: 0
CHILLS: 0
WEAKNESS: 0
BACK PAIN: 1
COUGH: 0
ARTHRALGIAS: 1
PARESTHESIAS: 0
FEVER: 0
DIAPHORESIS: 0
NUMBNESS: 1

## 2023-01-13 ASSESSMENT — PAIN SCALES - GENERAL: PAINLEVEL: NO PAIN (0)

## 2023-01-13 NOTE — PROGRESS NOTES
Assessment & Plan     Pain of right lower leg  Right knee pain, unspecified chronicity  Patient is a 72-year-old female past medical significant for fibromyalgia, osteoarthritis and obesity who presents to clinic due to 1 day of right leg pain located to knee, upper calf, and medial aspect of upper leg.  No trauma or injury.  Patient notes pain is worse than typical fibromyalgia pain.  No history of DVT/PE.  Vital signs normal.  Physical exam significant for tenderness palpation of right upper calf as well as medial aspect of right upper leg.  Differential diagnosis includes DVT, fibromyalgia, limping/muscle strain, or osteoarthritis.  Will complete venous ultrasound to rule out DVT.  If negative, suspect that increasing pain may be related to limping and will recommend cane use, elevation, heat/ice, and Tylenol.  Return and urgent follow-up precautions provided.  - US Lower Extremity Venous Duplex Right; Future    See Patient Instructions    Return in about 2 weeks (around 1/27/2023), or if symptoms worsen or fail to improve.    Teagan Caldwell PA-C  [facility] BRITANY Handy is a 72 year old, presenting for the following health issues:  Musculoskeletal Problem      HPI     Concern - upper right thigh pain  Onset: 1 day of right knee pain at the anterior, medial, and posterior aspect. Patent notes a burning and hurting feeling to the inner thigh on the right. No history of DVT/PE. No injury. Intermittent numbness in the right leg X unknown length of time. Patient is unsure if this is positional. Patient also notes history of fibromyalgia, but states pain is worse.   Description: sharp, burning pain upper inner right thigh  Intensity: mild  Progression of Symptoms:  waxing and waning  Accompanying Signs & Symptoms: started near right knee   Previous history of similar problem: none  Precipitating factors:        Worsened by: movement  Alleviating factors:        Improved by:  "rest  Therapies tried and outcome:  none         Review of Systems   Constitutional: Negative for chills, diaphoresis and fever.   Respiratory: Negative for cough and shortness of breath.    Cardiovascular: Negative for chest pain.   Musculoskeletal: Positive for arthralgias and back pain.   Neurological: Positive for numbness. Negative for weakness and paresthesias.            Objective    /72 (BP Location: Left arm, Cuff Size: Adult Regular)   Pulse 78   Temp 97.5  F (36.4  C) (Tympanic)   Ht 1.588 m (5' 2.5\")   Wt 108.4 kg (239 lb)   SpO2 98%   BMI 43.02 kg/m    Body mass index is 43.02 kg/m .  Physical Exam  Vitals and nursing note reviewed.   Constitutional:       General: She is not in acute distress.     Appearance: Normal appearance.   HENT:      Head: Normocephalic and atraumatic.   Eyes:      Extraocular Movements: Extraocular movements intact.      Pupils: Pupils are equal, round, and reactive to light.   Cardiovascular:      Rate and Rhythm: Normal rate and regular rhythm.      Heart sounds: Normal heart sounds.   Pulmonary:      Effort: Pulmonary effort is normal.      Breath sounds: Normal breath sounds.   Musculoskeletal:         General: Normal range of motion.      Cervical back: Normal range of motion.      Comments: Tenderness to palpation of anterior aspect of right knee as well as posterior/superior right calf and medial aspect of right upper leg.  No erythema, ecchymosis.  Gait with limp.  Cane use.   Skin:     General: Skin is warm and dry.   Neurological:      General: No focal deficit present.      Mental Status: She is alert.   Psychiatric:         Mood and Affect: Mood normal.         Behavior: Behavior normal.                            "

## 2023-01-13 NOTE — PATIENT INSTRUCTIONS
For further evaluation of your leg pain we are completing an ultrasound to look for blood clots.  We will contact you with results if there are worrisome findings.  Otherwise, results will be available through [facility].  If you develop any severe symptoms in the meantime such as difficulty breathing, coughing up blood, chest pain, fevers, or any other severe symptoms, please go to the emergency department.  Reach out with questions or concerns.

## 2023-01-13 NOTE — TELEPHONE ENCOUNTER
Noticed pain yesterday to her upper inner right thigh;     No discoloration, reports last night it felt warm; Feels like pain is up a little higher; no hx of blood clots, does report some back pain.     No falls, or recent injury, no swelling to area.     Does report not being active, sitting for long periods of time    No appointments at  today, patient scheduled at [facility] location for today.    Reason for Disposition    Patient wants to be seen    Additional Information    Negative: Looks like a broken bone or dislocated joint (e.g., crooked or deformed)    Negative: Sounds like a life-threatening emergency to the triager    Negative: Followed a hip injury    Negative: Followed a knee injury    Negative: Followed an ankle or foot injury    Negative: Back pain radiating (shooting) into leg(s)    Negative: Foot pain is main symptom    Negative: Ankle pain is main symptom    Negative: Knee pain is main symptom    Negative: Leg swelling is main symptom    Negative: Chest pain    Negative: Difficulty breathing    Negative: Entire foot is cool or blue in comparison to other side    Negative: Unable to walk    Negative: Fever and red area (or area very tender to touch)    Negative: Swollen joint and fever    Negative: Thigh or calf pain in only one leg and present > 1 hour    Negative: Thigh, calf, or ankle swelling in only one leg    Negative: Thigh, calf, or ankle swelling in both legs, but one side is definitely more swollen    Negative: History of prior 'blood clot' in leg or lungs (i.e., deep vein thrombosis, pulmonary embolism)    Negative: History of inherited increased risk of blood clots (e.g., factor 5 Leiden, antithrombin 3, protein C or protein S deficiency, prothrombin mutation)    Negative: Major surgery in past month    Negative: Hip or leg fracture (broken bone) in past month (or had cast on leg or ankle in past month)    Negative: Illness requiring prolonged bedrest in past month (e.g., immobilization,  long hospital stay)    Negative: Long-distance travel in past month (e.g., car, bus, train, plane; with trip lasting 6 or more hours)    Negative: Cancer treatment in past six months (or has cancer now)    Negative: Patient sounds very sick or weak to the triager    Negative: SEVERE pain (e.g., excruciating, unable to do any normal activities)    Negative: Cast on leg or ankle and now has increasing pain    Negative: Red area or streak > 2 inches (or 5 cm)    Negative: Painful rash with multiple small blisters grouped together (i.e., dermatomal distribution or 'band' or 'stripe')    Negative: Looks like a boil, infected sore, deep ulcer, or other infected rash (spreading redness, pus)    Negative: Localized rash is very painful (no fever)    Negative: Numbness in a leg or foot (i.e., loss of sensation)    Negative: Localized pain, redness or hard lump along vein    Protocols used: LEG PAIN-A-OH

## 2023-02-13 ENCOUNTER — OFFICE VISIT (OUTPATIENT)
Dept: FAMILY MEDICINE | Facility: CLINIC | Age: 73
End: 2023-02-13
Payer: MEDICARE

## 2023-02-13 VITALS
WEIGHT: 234 LBS | HEIGHT: 62 IN | TEMPERATURE: 98.6 F | BODY MASS INDEX: 43.06 KG/M2 | SYSTOLIC BLOOD PRESSURE: 154 MMHG | RESPIRATION RATE: 12 BRPM | HEART RATE: 68 BPM | DIASTOLIC BLOOD PRESSURE: 81 MMHG | OXYGEN SATURATION: 98 %

## 2023-02-13 DIAGNOSIS — E66.01 MORBID OBESITY (H): ICD-10-CM

## 2023-02-13 DIAGNOSIS — M17.0 PRIMARY OSTEOARTHRITIS OF BOTH KNEES: ICD-10-CM

## 2023-02-13 DIAGNOSIS — E03.9 HYPOTHYROIDISM, UNSPECIFIED TYPE: ICD-10-CM

## 2023-02-13 DIAGNOSIS — M25.512 CHRONIC LEFT SHOULDER PAIN: Primary | ICD-10-CM

## 2023-02-13 DIAGNOSIS — I10 HYPERTENSION GOAL BP (BLOOD PRESSURE) < 140/90: ICD-10-CM

## 2023-02-13 DIAGNOSIS — G89.29 CHRONIC LEFT SHOULDER PAIN: Primary | ICD-10-CM

## 2023-02-13 PROBLEM — M25.552 HIP PAIN, LEFT: Status: RESOLVED | Noted: 2017-11-07 | Resolved: 2023-02-13

## 2023-02-13 PROBLEM — M19.071 PRIMARY OSTEOARTHRITIS OF BOTH ANKLES: Status: ACTIVE | Noted: 2018-11-07

## 2023-02-13 PROBLEM — E83.42 HYPOMAGNESEMIA: Status: ACTIVE | Noted: 2018-11-07

## 2023-02-13 PROBLEM — M19.072 PRIMARY OSTEOARTHRITIS OF BOTH ANKLES: Status: ACTIVE | Noted: 2018-11-07

## 2023-02-13 PROBLEM — Z80.6 FAMILY HISTORY OF LEUKEMIA: Status: RESOLVED | Noted: 2018-11-07 | Resolved: 2023-02-13

## 2023-02-13 PROCEDURE — 99214 OFFICE O/P EST MOD 30 MIN: CPT | Performed by: FAMILY MEDICINE

## 2023-02-13 RX ORDER — EPINEPHRINE 0.3 MG/.3ML
INJECTION SUBCUTANEOUS
COMMUNITY
Start: 2022-02-21 | End: 2023-11-27

## 2023-02-13 RX ORDER — MECLIZINE HYDROCHLORIDE 25 MG/1
25 TABLET ORAL
COMMUNITY
Start: 2022-09-02 | End: 2023-02-13

## 2023-02-13 NOTE — PROGRESS NOTES
Assessment & Plan     (M25.512,  G89.29) Chronic left shoulder pain  (primary encounter diagnosis)  Comment: Differential diagnoses would include: degenerative joint disease, rotator cuff tendonitis, labral tear   Plan: xray recommended and declined; offered physical therapy and/or follow-up with orthopedic surgery     (I10) Hypertension goal BP (blood pressure) < 140/90  (E66.01) Morbid obesity (H)  Comment: labile blood pressure readings   Plan: continue medication, lifestyle strategies for weight loss and follow-up per AVS     (E03.9) Hypothyroidism, unspecified type  Comment: euthyroid on replacement     (M17.0) Primary osteoarthritis of both knees  Plan: We discussed treatment options including tylenol and topicals as needed or orthopedic surgery referral.              See Patient Instructions    Return in about 1 month (around 3/13/2023) for free nurse only blood pressure check.    Ana Qiu MD  [facility] MARINA Handy is a 72 year old, presenting for the following health issues:  Establish Care      History of Present Illness       Reason for visit:  Need new primary care dr    She eats 2-3 servings of fruits and vegetables daily.She consumes 0 sweetened beverage(s) daily.She exercises with enough effort to increase her heart rate 9 or less minutes per day.  She exercises with enough effort to increase her heart rate 3 or less days per week.   She is taking medications regularly.     Patient also presents with left shoulder pain for months. Hypertension well controlled on current medications without side effects, chest pain, or dyspnea. Patient also presents for follow-up of hypothyroidism, controlled on current medication.  Denies symptoms of hypo- or hyperthyroidism.           Review of Systems   CONSTITUTIONAL: NEGATIVE for fever, chills, change in weight  ENT/MOUTH: NEGATIVE for ear, mouth and throat problems  RESP: NEGATIVE for significant cough or  "SOB  MUSCULOSKELETAL: arthralgias    NEURO: NEGATIVE for weakness, dizziness or paresthesias      Objective    BP (!) 154/81 (BP Location: Left arm, Patient Position: Left side, Cuff Size: Adult Small)   Pulse 68   Temp 98.6  F (37  C) (Oral)   Resp 12   Ht 1.58 m (5' 2.21\")   Wt 106.1 kg (234 lb)   SpO2 98%   BMI 42.52 kg/m    Body mass index is 42.52 kg/m .  Physical Exam   GENERAL: alert, no distress and obese  NECK: no adenopathy, no asymmetry, masses, or scars and thyroid normal to palpation  RESP: lungs clear to auscultation - no rales, rhonchi or wheezes  CV: regular rate and rhythm, normal S1 S2, no S3 or S4, no murmur, click or rub, no peripheral edema    MS: no knee effusion; normal gait; left shoulder with generalized pain with ROM testing but normal strength   PSYCH: mentation appears normal, affect normal/bright    Office Visit on 10/28/2022   Component Date Value Ref Range Status     Cholesterol 10/28/2022 213 (H)  <200 mg/dL Final     Triglycerides 10/28/2022 41  <150 mg/dL Final     Direct Measure HDL 10/28/2022 85  >=50 mg/dL Final     LDL Cholesterol Calculated 10/28/2022 120 (H)  <=100 mg/dL Final     Non HDL Cholesterol 10/28/2022 128  <130 mg/dL Final     Patient Fasting > 8hrs? 10/28/2022 Yes   Final     TSH 10/28/2022 3.26  0.40 - 4.00 mU/L Final     No results found for this or any previous visit (from the past 24 hour(s)).                "

## 2023-02-13 NOTE — PATIENT INSTRUCTIONS
Get the shingles vaccine, called Shingrix (given as 2 shots, 2 to 6 months apart), even if you have already had the Zostavax vaccine. Discuss getting the Shingix vaccine from your pharmacist, or schedule an ancillary shot visit here. Some insurances do not cover the cost of these vaccines.       Did you know you can lower your blood pressure with your daily habits?    *Losing 20 pounds of weight lowers blood pressure 5 to 20 points.  *Eating a low-fat diet rich in fruits, vegetables and low-fat dairy lowers blood pressure 8 to 14 points.  *Eating a low-salt diet lowers blood pressure 2 to 8 points.  *Exercising regularly lowers blood pressure 4 to 9 points.  *Reducing alcohol use lowers blood pressure 2 to 4 points.

## 2023-02-27 ENCOUNTER — ALLIED HEALTH/NURSE VISIT (OUTPATIENT)
Dept: FAMILY MEDICINE | Facility: CLINIC | Age: 73
End: 2023-02-27
Payer: MEDICARE

## 2023-02-27 VITALS — DIASTOLIC BLOOD PRESSURE: 78 MMHG | SYSTOLIC BLOOD PRESSURE: 134 MMHG

## 2023-02-27 DIAGNOSIS — Z01.30 BP CHECK: Primary | ICD-10-CM

## 2023-02-27 PROCEDURE — 99207 PR NO CHARGE NURSE ONLY: CPT

## 2023-02-27 NOTE — PROGRESS NOTES
Rozina Bell is a 72 year old patient who comes in today for a Blood Pressure check.  Initial BP:  /78      Data Unavailable  Disposition: results routed to provider    Wanda Bejarano MA

## 2023-04-24 ENCOUNTER — TELEPHONE (OUTPATIENT)
Dept: FAMILY MEDICINE | Facility: CLINIC | Age: 73
End: 2023-04-24
Payer: MEDICARE

## 2023-04-24 NOTE — TELEPHONE ENCOUNTER
"Patient calling requesting to know what medications were used for her spinal injection in 2020.    Per office visit notes:    \"1.5ml of 0.5% bupivacaine with 10mg of dexamethasone was injected, divided equally between the two sides.  The needle was flushed with lidocaine and removed.\"    Patient verbalized understanding. Requesting this info to give to [facility] for her next injection.     Yanely Mercado RN  [facility]    "

## 2023-06-12 ENCOUNTER — TRANSFERRED RECORDS (OUTPATIENT)
Dept: HEALTH INFORMATION MANAGEMENT | Facility: CLINIC | Age: 73
End: 2023-06-12
Payer: MEDICARE

## 2023-07-19 NOTE — TELEPHONE ENCOUNTER
Patient called back. Provider's message relayed. Patient stated she has foot issues and it would be painful but she would rather do the treadmill versus medication stress test due to her allergies to medications.     Thank you,  Yoko Franco RN  [facility], [place]      Erythromycin Counseling:  I discussed with the patient the risks of erythromycin including but not limited to GI upset, allergic reaction, drug rash, diarrhea, increase in liver enzymes, and yeast infections.

## 2023-10-02 ENCOUNTER — TRANSFERRED RECORDS (OUTPATIENT)
Dept: HEALTH INFORMATION MANAGEMENT | Facility: CLINIC | Age: 73
End: 2023-10-02
Payer: MEDICARE

## 2023-10-03 ENCOUNTER — IMMUNIZATION (OUTPATIENT)
Dept: FAMILY MEDICINE | Facility: CLINIC | Age: 73
End: 2023-10-03
Payer: MEDICARE

## 2023-10-03 DIAGNOSIS — Z23 NEED FOR SHINGLES VACCINE: ICD-10-CM

## 2023-10-03 DIAGNOSIS — Z23 ENCOUNTER FOR IMMUNIZATION: Primary | ICD-10-CM

## 2023-10-03 PROCEDURE — 91320 SARSCV2 VAC 30MCG TRS-SUC IM: CPT

## 2023-10-03 PROCEDURE — 99207 PR NO CHARGE NURSE ONLY: CPT

## 2023-10-03 PROCEDURE — 90480 ADMN SARSCOV2 VAC 1/ONLY CMP: CPT

## 2023-10-03 PROCEDURE — 90662 IIV NO PRSV INCREASED AG IM: CPT

## 2023-10-03 PROCEDURE — G0008 ADMIN INFLUENZA VIRUS VAC: HCPCS

## 2023-10-03 NOTE — PROGRESS NOTES
Prior to immunization administration, verified patients identity using patient s name and date of birth. Please see Immunization Activity for additional information.     Screening Questionnaire for Adult Immunization    Are you sick today?   No   Do you have allergies to medications, food, a vaccine component or latex?   No   Have you ever had a serious reaction after receiving a vaccination?   No   Do you have a long-term health problem with heart, lung, kidney, or metabolic disease (e.g., diabetes), asthma, a blood disorder, no spleen, complement component deficiency, a cochlear implant, or a spinal fluid leak?  Are you on long-term aspirin therapy?   No   Do you have cancer, leukemia, HIV/AIDS, or any other immune system problem?   No   Do you have a parent, brother, or sister with an immune system problem?   No   In the past 3 months, have you taken medications that affect  your immune system, such as prednisone, other steroids, or anticancer drugs; drugs for the treatment of rheumatoid arthritis, Crohn s disease, or psoriasis; or have you had radiation treatments?   No   Have you had a seizure, or a brain or other nervous system problem?   No   During the past year, have you received a transfusion of blood or blood    products, or been given immune (gamma) globulin or antiviral drug?   No   For women: Are you pregnant or is there a chance you could become       pregnant during the next month?   No   Have you received any vaccinations in the past 4 weeks?   No     Immunization questionnaire answers were all negative.    I have reviewed the following standing orders:   This patient is due and qualifies for the Covid-19 vaccine.     Click here for COVID-19 Standing Order    I have reviewed the vaccines inclusion and exclusion criteria; No concerns regarding eligibility.     This patient is due and qualifies for the Influenza vaccine.    Click here for Influenza Vaccine Standing Order    I have reviewed the  vaccines inclusion and exclusion criteria; No concerns regarding eligibility.     Patient instructed to remain in clinic for 15 minutes afterwards, and to report any adverse reactions.     Screening performed by Xenia Voss MA on 10/3/2023 at 3:53 PM.

## 2023-10-17 ENCOUNTER — NURSE TRIAGE (OUTPATIENT)
Dept: FAMILY MEDICINE | Facility: CLINIC | Age: 73
End: 2023-10-17

## 2023-10-17 ENCOUNTER — VIRTUAL VISIT (OUTPATIENT)
Dept: FAMILY MEDICINE | Facility: CLINIC | Age: 73
End: 2023-10-17
Payer: MEDICARE

## 2023-10-17 DIAGNOSIS — U07.1 INFECTION DUE TO 2019 NOVEL CORONAVIRUS: Primary | ICD-10-CM

## 2023-10-17 PROCEDURE — 99213 OFFICE O/P EST LOW 20 MIN: CPT | Mod: VID | Performed by: PHYSICIAN ASSISTANT

## 2023-10-17 NOTE — TELEPHONE ENCOUNTER
Provider Response to 2nd Level Triage Request    I have reviewed the RN documentation. My recommendation is:  Virtual Visit she should schedule a virtual visit with first available provider-today or tomorrow to discuss symptoms and possible treatment.  Needs to be a video visit.  Agree if worsening to go to the ER.  Arti Galvan PA-C

## 2023-10-17 NOTE — PATIENT INSTRUCTIONS
Sukhdev Handy,     Based on your health history you do qualify for treatment of COVID-19.  For treatment you have been prescribed Paxlovid.  Paxlovid is a combined antiviral medication that reduces risk of hospitalization or severe COVID by 90%.  It is important that you  this medication and start it right away today.  The medication was sent to your pharmacy.  Please see below for pharmacy information.    While taking Paxlovid, you can take medications as prescribed.     You may continue to use Tylenol/ibuprofen for fevers, headache, body ache.  You can also use over-the-counter decongestants and cough suppressants to help manage symptoms.    If you develop any severe symptoms of medication reaction or COVID-19 including chest pain, coughing up blood, shortness of breath, swelling, rashes, or any other severe symptoms, please call 911/go to the emergency department.    Please reach out with any questions or concerns.  Alirio Grant,  Teagan Caldwell PA-C    COVID-19 Outpatient Treatments  Your care team can help you find the best treatments for COVID-19. Talk to a health care provider or refer to the FDA medicine fact sheets below.  Paxlovid (nirmatrelvir and ritonavir): https://www.paxlovid.com/resources  Molnupiravir (Lagevrio): https://www.fda.gov/media/687719/download  Important: We can only prescribe Paxlovid or Molnupiravir when it can be started within 5 days of first having symptoms.  Paxlovid (nimatrelvir and ritonavir)  How it works  Two medicines (nirmatrelvir and ritonavir) are taken together. They stop the virus from growing. Less amount of virus is easier for your body to fight.  Benefits  Lowers risk of a hospital stay or death from COVID-19.  How to take  Medicine comes in a daily container with both medicine tablets. Take by mouth twice daily (once in the morning, once at night) for 5 days.  The number of tablets to take varies by patient.  Don't chew or break capsules. Swallow whole.  When to  take  Take it as soon as possible and within 5 days of your first symptoms.  Who can take it  Patients must be 12 years or older weigh at least 88 pounds. Paxlovid is the preferred treatment for pregnant patients.  Possible side effects  Can cause altered sense of taste, diarrhea (loose, watery stools), high blood pressure, muscle aches.  Medicine conflicts  Some medicines may conflict with Paxlovid and may cause serious side effects.  Tell your care team about all the medicines you take, including prescription and over-the-counter medicines, vitamins, and herbal supplements.  Your care team will review your medicines to make sure that you can safely take Paxlovid.  Cautions  Paxlovid is not advised for patients with severe kidney or liver disease. If you have kidney or liver problems, the dose may need to be changed.  If you're pregnant or breastfeeding, talk to your care team about your options.  If you take hormonal birth control (such as the Pill), then you or your partner should also use a non-hormonal form of birth control (such as a condom). Keep doing this for 1 menstrual cycle after your last dose of Paxlovid.  Molnupiravir (lagevrio)  How it works  Stops the virus from growing. Less amount of virus is easier for your body to fight.  Benefits  Lowers risk of a hospital stay or death from COVID-19.  How to take  Take 4 capsules by mouth every 12 hours (4 in the morning and 4 at night) for 5 days. Don't chew or break capsules. Swallow whole.  When to take  Take as soon as possible and within 5 days of your first symptoms.  Who can take it  Patients must be 18 years or older.   Possible side effects  Diarrhea (loose, watery stools), nausea (feeling sick to your stomach), dizziness, headaches.  Medicine conflicts  Right now, there are no known conflicts with other drugs. But tell your care team about all medicines you take.  Cautions  This medicine is not advised for patients who are pregnant.  If you are  someone who could become pregnant, use trusted birth control until 4 days after your last dose of molnupiravir.  If your partner could become pregnant, you should use trusted birth control until 3 months after your last dose of molnupiravir.

## 2023-10-17 NOTE — TELEPHONE ENCOUNTER
Patient notified of provider message as written. Patient verbalized good understanding. Scheduled for Video Visit today, 10/17/23 at 11:20am. Patient is agreeable to this and will go to ER if worsening symptoms.    Nancy BARRIGA, RN  [facility]

## 2023-10-17 NOTE — PROGRESS NOTES
"Rozina is a 73 year old who is being evaluated via a billable video visit.      How would you like to obtain your AVS? MyChart  If the video visit is dropped, the invitation should be resent by: Text to cell phone: [phone]  Will anyone else be joining your video visit? No          Assessment & Plan     Infection due to 2019 novel coronavirus  Patient is a 73 YOF who presents to clinic due to symptoms of COVID-19 starting 2 day(s) ago with subsequent positive test results.  Patient is able to speak in full sentences-no signs of respiratory distress. Per CDC criteria, patient qualifies for prescribed treatment of COVID19 as patient meets high risk criteria. Discussed treatment options for COVID-19 as well as risks and benefits.  Patient elects to proceed with Paxlovid.  Discussed home medications and possible interactions.  Also discussed OTC options for managing symptoms of COVID-19.  Return and urgent/emergent follow-up precautions provided.    - nirmatrelvir and ritonavir (PAXLOVID) 300 mg/100 mg therapy pack; Take 3 tablets by mouth 2 times daily for 5 days (Take 2 Nirmatrelvir tablets and 1 Ritonavir tablet twice daily for 5 days)      COVID-19 positive patient.  Encounter for consideration of medication intervention. Patient does qualify for a prescription. Full discussion with patient including medication options, risks and benefits. Potential drug interactions reviewed with patient.     Treatment Planned Paxlovid, Rx sent to [facility]    Temporary change to home medications: None    Estimated body mass index is 42.52 kg/m  as calculated from the following:    Height as of 2/13/23: 1.58 m (5' 2.21\").    Weight as of 2/13/23: 106.1 kg (234 lb).  GFR Estimate   Date Value Ref Range Status   08/31/2021 90 >60 mL/min/1.73m2 Final     Comment:     As of July 11, 2021, eGFR is calculated by the CKD-EPI creatinine equation, without race adjustment. eGFR can be influenced by muscle mass, exercise, and diet. " "The reported eGFR is an estimation only and is only applicable if the renal function is stable.   11/13/2020 >90 >60 mL/min/[1.73_m2] Final     Comment:     Non  GFR Calc  Starting 12/18/2018, serum creatinine based estimated GFR (eGFR) will be   calculated using the Chronic Kidney Disease Epidemiology Collaboration   (CKD-EPI) equation.       No results found for: \"[id number]\"    MELANI Gomez [facility] BRITANY Handy is a 73 year old, presenting for the following health issues:  Covid Concern (Positive covid)      10/17/2023     9:36 AM   Additional Questions   Roomed by Sasha/MA   Accompanied by N/A         10/17/2023     9:36 AM   Patient Reported Additional Medications   Patient reports taking the following new medications N/A       HPI     Acute Illness  Acute illness concerns: Positive Covid 10/16/2023  Onset/Duration: 2 days  Symptoms:  Fever: YES- 101  Chills/Sweats: YES chills  Headache (location?): YES  Sinus Pressure: No  Conjunctivitis:  No  Ear Pain: no  Rhinorrhea: YES  Congestion: YES  Sore Throat: YES  Cough: YES-non-productive  Wheeze: No  Decreased Appetite: YES  Nausea: No  Vomiting: No  Diarrhea: No  Dysuria/Freq.: No  Dysuria or Hematuria: No  Fatigue/Achiness: YES  Sick/Strep Exposure: No  Therapies tried and outcome: None              Objective           Vitals:  No vitals were obtained today due to virtual visit.    Physical Exam   GENERAL: Healthy, alert and no distress  EYES: Eyes grossly normal to inspection.  No discharge or erythema, or obvious scleral/conjunctival abnormalities.  RESP: No audible wheeze, cough, or visible cyanosis.  No visible retractions or increased work of breathing.    SKIN: Visible skin clear. No significant rash, abnormal pigmentation or lesions.  NEURO: Cranial nerves grossly intact.  Mentation and speech appropriate for age.  PSYCH: Mentation appears normal, affect normal/bright, judgement and insight " intact, normal speech and appearance well-groomed.                Video-Visit Details    Type of service:  Video Visit   Video Start Time: 11:35 AM  Video End Time:11:50 AM    Originating Location (pt. Location): Home    Distant Location (provider location):  On-site  Platform used for Video Visit: Dante

## 2023-10-17 NOTE — TELEPHONE ENCOUNTER
"Received call from patient. She is reporting testing positive for COVID-19 yesterday and first symptom 2 days ago. Symptoms began with severe constant headache and sore throat. She is very fatigued, feeling lightheaded and has a very dry mouth. Urine output has been getting lower overnight and urine looks \"darker\" in color this morning. Unable to sleep at all overnight. No chest pain or shortness of breath noted. No vomiting or diarrhea. Patient has been drinking very little; encouraged to push fluids.     Due to these symptoms, sending as 2nd Level Triage to PCP to advise disposition per protocol. If symptoms worsen, ER immediately.    Callback: [phone]    Reason for Disposition   Drinking very little and dehydration suspected (e.g., no urine > 12 hours, very dry mouth, very lightheaded)    Additional Information   Negative: SEVERE difficulty breathing (e.g., struggling for each breath, speaks in single words)   Negative: Difficult to awaken or acting confused (e.g., disoriented, slurred speech)   Negative: Bluish (or gray) lips or face now   Negative: Shock suspected (e.g., cold/pale/clammy skin, too weak to stand, low BP, rapid pulse)   Negative: Sounds like a life-threatening emergency to the triager   Negative: Diagnosed or suspected COVID-19 and symptoms lasting 3 or more weeks   Negative: COVID-19 exposure and no symptoms   Negative: COVID-19 vaccine reaction suspected (e.g., fever, headache, muscle aches) occurring 1 to 3 days after getting vaccine   Negative: COVID-19 vaccine, questions about   Negative: Lives with someone known to have influenza (flu test positive) and flu-like symptoms (e.g., cough, runny nose, sore throat, SOB; with or without fever)   Negative: Possible COVID-19 symptoms and triager concerned about severity of symptoms or other causes   Negative: COVID-19 and breastfeeding, questions about   Negative: SEVERE or constant chest pain or pressure  (Exception: Mild central chest pain, " present only when coughing.)   Negative: MODERATE difficulty breathing (e.g., speaks in phrases, SOB even at rest, pulse 100-120)   Negative: Headache and stiff neck (can't touch chin to chest)   Negative: Oxygen level (e.g., pulse oximetry) 90% or lower   Negative: Chest pain or pressure  (Exception: MILD central chest pain, present only when coughing.)    Protocols used: Coronavirus (COVID-19) Diagnosed or [id number]    LAWRENCE LeivaN VINCE  [facility], [facility]

## 2023-10-25 DIAGNOSIS — E03.9 HYPOTHYROIDISM, UNSPECIFIED TYPE: ICD-10-CM

## 2023-10-25 DIAGNOSIS — M48.00 SPINAL STENOSIS, UNSPECIFIED SPINAL REGION: ICD-10-CM

## 2023-10-26 ENCOUNTER — TELEPHONE (OUTPATIENT)
Dept: FAMILY MEDICINE | Facility: CLINIC | Age: 73
End: 2023-10-26
Payer: MEDICARE

## 2023-10-26 RX ORDER — LEVOTHYROXINE SODIUM 50 UG/1
TABLET ORAL
Qty: 90 TABLET | Refills: 0 | Status: SHIPPED | OUTPATIENT
Start: 2023-10-26 | End: 2023-11-27

## 2023-10-26 RX ORDER — MELOXICAM 15 MG/1
15 TABLET ORAL DAILY
Qty: 90 TABLET | Refills: 3 | Status: SHIPPED | OUTPATIENT
Start: 2023-10-26

## 2023-10-26 NOTE — TELEPHONE ENCOUNTER
Pt is calling regarding covid-19. Her first initial positive covid-19 test was 10/16/23. She was seen and given Rx for Paxlovid which she took 10/17-10/22/23. She tested negative for covid-19 on 10/23/23. and then all of the sudden is having sneezing today, so she tested again and tested positive today for covid-19. States that she has nasal congestion and headache, but otherwise feeling okay and O2 saturation is 99%. Discussed that her symptoms sound consistent with rebound covid-19. Advised that she should resume isolation. She verbalized understanding to this. Patient reports experiencing high blood pressure while on Paxlovid, notified her that this is a known side effect. Pt asked about refills for her levothyroxine and meloxicam. Notified her that these were sent to pharmacy today. Pt re-scheduled her appointment for physical exam. Pt expressing some confusion regarding documentation for visit with a physician and some information that she saw that might not be correct. Advised her to contact patient relations or billing if she has questions about the documentation or billing from a visit.     Maris Kern RN   [facility]

## 2023-11-14 ENCOUNTER — TRANSFERRED RECORDS (OUTPATIENT)
Dept: HEALTH INFORMATION MANAGEMENT | Facility: CLINIC | Age: 73
End: 2023-11-14
Payer: MEDICARE

## 2023-11-27 ENCOUNTER — OFFICE VISIT (OUTPATIENT)
Dept: FAMILY MEDICINE | Facility: CLINIC | Age: 73
End: 2023-11-27
Payer: MEDICARE

## 2023-11-27 VITALS
HEIGHT: 63 IN | SYSTOLIC BLOOD PRESSURE: 133 MMHG | TEMPERATURE: 97.7 F | HEART RATE: 61 BPM | BODY MASS INDEX: 39.23 KG/M2 | RESPIRATION RATE: 12 BRPM | DIASTOLIC BLOOD PRESSURE: 87 MMHG | WEIGHT: 221.4 LBS | OXYGEN SATURATION: 100 %

## 2023-11-27 DIAGNOSIS — Z78.0 ASYMPTOMATIC MENOPAUSAL STATE: ICD-10-CM

## 2023-11-27 DIAGNOSIS — E03.9 HYPOTHYROIDISM, UNSPECIFIED TYPE: ICD-10-CM

## 2023-11-27 DIAGNOSIS — M48.061 SPINAL STENOSIS OF LUMBAR REGION, UNSPECIFIED WHETHER NEUROGENIC CLAUDICATION PRESENT: ICD-10-CM

## 2023-11-27 DIAGNOSIS — T50.905A ADVERSE EFFECT OF DRUG, INITIAL ENCOUNTER: ICD-10-CM

## 2023-11-27 DIAGNOSIS — Z13.220 SCREENING FOR HYPERLIPIDEMIA: ICD-10-CM

## 2023-11-27 DIAGNOSIS — Z12.11 SCREEN FOR COLON CANCER: ICD-10-CM

## 2023-11-27 DIAGNOSIS — Z00.00 ENCOUNTER FOR MEDICARE ANNUAL WELLNESS EXAM: Primary | ICD-10-CM

## 2023-11-27 PROCEDURE — 84443 ASSAY THYROID STIM HORMONE: CPT | Performed by: PHYSICIAN ASSISTANT

## 2023-11-27 PROCEDURE — G0439 PPPS, SUBSEQ VISIT: HCPCS | Performed by: PHYSICIAN ASSISTANT

## 2023-11-27 PROCEDURE — 36415 COLL VENOUS BLD VENIPUNCTURE: CPT | Performed by: PHYSICIAN ASSISTANT

## 2023-11-27 PROCEDURE — 80048 BASIC METABOLIC PNL TOTAL CA: CPT | Performed by: PHYSICIAN ASSISTANT

## 2023-11-27 PROCEDURE — 80061 LIPID PANEL: CPT | Performed by: PHYSICIAN ASSISTANT

## 2023-11-27 RX ORDER — EPINEPHRINE 0.3 MG/.3ML
INJECTION SUBCUTANEOUS
Qty: 2 EACH | Refills: 1 | Status: SHIPPED | OUTPATIENT
Start: 2023-11-27

## 2023-11-27 RX ORDER — RESPIRATORY SYNCYTIAL VIRUS VACCINE 120MCG/0.5
0.5 KIT INTRAMUSCULAR ONCE
Qty: 1 EACH | Refills: 0 | Status: CANCELLED | OUTPATIENT
Start: 2023-11-27 | End: 2023-11-27

## 2023-11-27 RX ORDER — LEVOTHYROXINE SODIUM 50 UG/1
TABLET ORAL
Qty: 90 TABLET | Refills: 3 | Status: SHIPPED | OUTPATIENT
Start: 2023-11-27

## 2023-11-27 ASSESSMENT — ENCOUNTER SYMPTOMS
WEAKNESS: 1
SHORTNESS OF BREATH: 0
HEARTBURN: 0
CONSTIPATION: 1
PARESTHESIAS: 1
ABDOMINAL PAIN: 0
NAUSEA: 0
BREAST MASS: 0
DIZZINESS: 0
JOINT SWELLING: 1
NERVOUS/ANXIOUS: 0
DYSURIA: 0
ARTHRALGIAS: 1
PALPITATIONS: 0
HEMATOCHEZIA: 0
MYALGIAS: 1
COUGH: 1
HEMATURIA: 0
HEADACHES: 1
EYE PAIN: 0
DIARRHEA: 0
SORE THROAT: 0
CHILLS: 1
FEVER: 0
FREQUENCY: 1

## 2023-11-27 ASSESSMENT — ACTIVITIES OF DAILY LIVING (ADL): CURRENT_FUNCTION: NO ASSISTANCE NEEDED

## 2023-11-27 NOTE — PATIENT INSTRUCTIONS
Consider Shingles and RSV vaccines.   Schedule Bone density exam [phone]    Patient Education   Personalized Prevention Plan  You are due for the preventive services outlined below.  Your care team is available to assist you in scheduling these services.  If you have already completed any of these items, please share that information with your care team to update in your medical record.  Health Maintenance Due   Topic Date Due    RSV VACCINE (Pregnancy & 60+) (1 - 1-dose 60+ series) Never done    Zoster (Shingles) Vaccine (2 of 3) 11/18/2015    ANNUAL REVIEW OF HM ORDERS  10/28/2023    Annual Wellness Visit  10/28/2023    Thyroid Function Lab  10/28/2023    Colorectal Cancer Screening  11/30/2023     Your Health Risk Assessment indicates you feel you are not in good health    A healthy lifestyle helps keep the body fit and the mind alert. It helps protect you from disease, helps you fight disease, and helps prevent chronic disease (disease that doesn't go away) from getting worse. This is important as you get older and begin to notice twinges in muscles and joints and a decline in the strength and stamina you once took for granted. A healthy lifestyle includes good healthcare, good nutrition, weight control, recreation, and regular exercise. Avoid harmful substances and do what you can to keep safe. Another part of a healthy lifestyle is stay mentally active and socially involved.    Good healthcare   Have a wellness visit every year.   If you have new symptoms, let us know right away. Don't wait until the next checkup.   Take medicines exactly as prescribed and keep your medicines in a safe place. Tell us if your medicine causes problems.   Healthy diet and weight control   Eat 3 or 4 small, nutritious, low-fat, high-fiber meals a day. Include a variety of fruits, vegetables, and whole-grain foods.   Make sure you get enough calcium in your diet. Calcium, vitamin D, and exercise help prevent osteoporosis (bone  "thinning).   If you live alone, try eating with others when you can. That way you get a good meal and have company while you eat it.   Try to keep a healthy weight. If you eat more calories than your body uses for energy, it will be stored as fat and you will gain weight.     Recreation   Recreation is not limited to sports and team events. It includes any activity that provides relaxation, interest, enjoyment, and exercise. Recreation provides an outlet for physical, mental, and social energy. It can give a sense of worth and achievement. It can help you stay healthy.    Mental Exercise and Social Involvement  Mental and emotional health is as important as physical health. Keep in touch with friends and family. Stay as active as possible. Continue to learn and challenge yourself.   Things you can do to stay mentally active are:  Learn something new, like a foreign language or musical instrument.   Play SCRABBLE or do crossword puzzles. If you cannot find people to play these games with you at home, you can play them with others on your computer through the Internet.   Join a games club--anything from card games to chess or checkers or lawn bowling.   Start a new hobby.   Go back to school.   Volunteer.   Read.   Keep up with world events.  Learning About Being Physically Active  What is physical activity?     Being physically active means doing any kind of activity that gets your body moving.  The types of physical activity that can help you get fit and stay healthy include:  Aerobic or \"cardio\" activities. These make your heart beat faster and make you breathe harder, such as brisk walking, riding a bike, or running. They strengthen your heart and lungs and build up your endurance.  Strength training activities. These make your muscles work against, or \"resist,\" something. Examples include lifting weights or doing push-ups. These activities help tone and strengthen your muscles and bones.  Stretches. These let you " move your joints and muscles through their full range of motion. Stretching helps you be more flexible.  Reaching a balance between these three types of physical activity is important because each one contributes to your overall fitness.  What are the benefits of being active?  Being active is one of the best things you can do for your health. It helps you to:  Feel stronger and have more energy to do all the things you like to do.  Focus better at school or work.  Feel, think, and sleep better.  Reach and stay at a healthy weight.  Lose fat and build lean muscle.  Lower your risk for serious health problems, including diabetes, heart attack, high blood pressure, and some cancers.  Keep your heart, lungs, bones, muscles, and joints strong and healthy.  How can you make being active part of your life?  Start slowly. Make it your long-term goal to get at least 30 minutes of exercise on most days of the week. Walking is a good choice. You also may want to do other activities, such as running, swimming, cycling, or playing tennis or team sports.  Pick activities that you like--ones that make your heart beat faster, your muscles stronger, and your muscles and joints more flexible. If you find more than one thing you like doing, do them all. You don't have to do the same thing every day.  Get your heart pumping every day. Any activity that makes your heart beat faster and keeps it at that rate for a while counts.  Here are some great ways to get your heart beating faster:  Go for a brisk walk, run, or hike.  Go for a swim or bike ride.  Take an online exercise class or dance.  Play a game of touch football, basketball, or soccer.  Play tennis, pickleball, or racquetball.  Climb stairs.  Even some household chores can be aerobic. Just do them at a faster pace. Raking or mowing the lawn, sweeping the garage, and vacuuming and cleaning your home all can help get your heart rate up.  Strengthen your muscles during the week.  "You don't have to lift heavy weights or grow big, bulky muscles to get stronger. Doing a few simple activities that make your muscles work against, or \"resist,\" something can help you get stronger. Aim for at least twice a week.  For example, you can:  Do push-ups or sit-ups, which use your own body weight as resistance.  Lift weights or dumbbells or use stretch bands at home or in a gym or community center.  Stretch your muscles often. Stretching will help you as you become more active. It can help you stay flexible and loosen tight muscles. It can also help improve your balance and posture and can be a great way to relax.  Be sure to stretch the muscles you'll be using when you work out. It's best to warm your muscles slightly before you stretch them. Walk or do some other light aerobic activity for a few minutes. Then start stretching.  When you stretch your muscles:  Do it slowly. Stretching is not about going fast or making sudden movements.  Don't push or bounce during a stretch.  Hold each stretch for at least 15 to 30 seconds, if you can. You should feel a stretch in the muscle, but not pain.  Breathe out as you do the stretch. Then breathe in as you hold the stretch. Don't hold your breath.  If you're worried about how more activity might affect your health, have a checkup before you start. Follow any special advice your doctor gives you for getting a smart start.  Where can you learn more?  Go to https://www.[employer]net/patiented  Enter W332 in the search box to learn more about \"Learning About Being Physically Active.\"  Current as of: June 6, 2023               Content Version: 13.8    8267-6233 [employer].   Care instructions adapted under license by your healthcare professional. If you have questions about a medical condition or this instruction, always ask your healthcare professional. [employer] disclaims any warranty or liability for your use of this " information.      Bladder Training: Care Instructions  Your Care Instructions     Bladder training is used to treat urge incontinence and stress incontinence. Urge incontinence means that the need to urinate comes on so fast that you can't get to a toilet in time. Stress incontinence means that you leak urine because of pressure on your bladder. For example, it may happen when you laugh, cough, or lift something heavy.  Bladder training can increase how long you can wait before you have to urinate. It can also help your bladder hold more urine. And it can give you better control over the urge to urinate.  It is important to remember that bladder training takes a few weeks to a few months to make a difference. You may not see results right away, but don't give up.  Follow-up care is a key part of your treatment and safety. Be sure to make and go to all appointments, and call your doctor if you are having problems. It's also a good idea to know your test results and keep a list of the medicines you take.  How can you care for yourself at home?  Work with your doctor to come up with a bladder training program that is right for you. You may use one or more of the following methods.  Delayed urination  In the beginning, try to keep from urinating for 5 minutes after you first feel the need to go.  While you wait, take deep, slow breaths to relax. Kegel exercises can also help you delay the need to go to the bathroom.  After some practice, when you can easily wait 5 minutes to urinate, try to wait 10 minutes before you urinate.  Slowly increase the waiting period until you are able to control when you have to urinate.  Scheduled urination  Empty your bladder when you first wake up in the morning.  Schedule times throughout the day when you will urinate.  Start by going to the bathroom every hour, even if you don't need to go.  Slowly increase the time between trips to the bathroom.  When you have found a schedule that  "works well for you, keep doing it.  If you wake up during the night and have to urinate, do it. Apply your schedule to waking hours only.  Kegel exercises  These tighten and strengthen pelvic muscles, which can help you control the flow of urine. (If doing these exercises causes pain, stop doing them and talk with your doctor.) To do Kegel exercises:  Squeeze your muscles as if you were trying not to pass gas. Or squeeze your muscles as if you were stopping the flow of urine. Your belly, legs, and buttocks shouldn't move.  Hold the squeeze for 3 seconds, then relax for 5 to 10 seconds.  Start with 3 seconds, then add 1 second each week until you are able to squeeze for 10 seconds.  Repeat the exercise 10 times a session. Do 3 to 8 sessions a day.  When should you call for help?  Watch closely for changes in your health, and be sure to contact your doctor if:    Your incontinence is getting worse.     You do not get better as expected.   Where can you learn more?  Go to https://www.[employer].net/patiented  Enter V684 in the search box to learn more about \"Bladder Training: Care Instructions.\"  Current as of: February 28, 2023               Content Version: 13.8    6028-4445 [employer].   Care instructions adapted under license by your healthcare professional. If you have questions about a medical condition or this instruction, always ask your healthcare professional. [employer] disclaims any warranty or liability for your use of this information.         "

## 2023-11-27 NOTE — PROGRESS NOTES
"SUBJECTIVE:   Rozina is a 73 year old, presenting for the following:  Wellness Visit        Are you in the first 12 months of your Medicare coverage?  No    Healthy Habits:     In general, how would you rate your overall health?  Fair    Frequency of exercise:  1 day/week    Duration of exercise:  15-30 minutes    Do you usually eat at least 4 servings of fruit and vegetables a day, include whole grains    & fiber and avoid regularly eating high fat or \"junk\" foods?  Yes    Taking medications regularly:  No    Barriers to taking medications:  None    Medication side effects:  None    Ability to successfully perform activities of daily living:  No assistance needed    Home Safety:  No safety concerns identified    Hearing Impairment:  No hearing concerns    In the past 6 months, have you been bothered by leaking of urine? Yes    In general, how would you rate your overall mental or emotional health?  Excellent    Additional concerns today:  No      Today's PHQ-2 Score:       11/27/2023     1:59 PM   PHQ-2 ( 1999 [employer])   Q1: Little interest or pleasure in doing things 0   Q2: Feeling down, depressed or hopeless 0   PHQ-2 Score 0   Q1: Little interest or pleasure in doing things Not at all   Q2: Feeling down, depressed or hopeless Not at all   PHQ-2 Score 0     Mobic- in her back, takes it daily. Does work. Sees Dr. Mckeon. Working with [facility].       Have you ever done Advance Care Planning? (For example, a Health Directive, POLST, or a discussion with a medical provider or your loved ones about your wishes): Yes, patient states has an Advance Care Planning document and will bring a copy to the clinic.       Fall risk  Fallen 2 or more times in the past year?: No  Any fall with injury in the past year?: No    Cognitive Screening   1) Repeat 3 items (Leader, Season, Table)    2) Clock draw: NORMAL  3) 3 item recall: Recalls 3 objects  Results: 3 items recalled: COGNITIVE IMPAIRMENT LESS LIKELY    Mini-CogTM " Copyright SANA Momin. Licensed by the author for use in [facility]; reprinted with permission (darryl@[facility]). All rights reserved.      Do you have sleep apnea, excessive snoring or daytime drowsiness? : no    Reviewed and updated as needed this visit by clinical staff   Tobacco  Allergies  Meds  Problems  Med Hx  Surg Hx  Fam Hx          Reviewed and updated as needed this visit by Provider   Tobacco  Allergies  Meds  Problems  Med Hx  Surg Hx  Fam Hx         Social History     Tobacco Use     Smoking status: Never     Smokeless tobacco: Never     Tobacco comments:     Lives in smoke free household   Substance Use Topics     Alcohol use: No             11/27/2023     1:58 PM   Alcohol Use   Prescreen: >3 drinks/day or >7 drinks/week? No     Do you have a current opioid prescription? No  Do you use any other controlled substances or medications that are not prescribed by a provider? None          Current providers sharing in care for this patient include:   Patient Care Team:  Ana Qiu MD as PCP - General (Family Medicine)  Jesse Mckeon MD as MD (Specialist)  Emmanuel Max MD as MD (Orthopedics)  Ana Qiu MD as Assigned PCP    The following health maintenance items are reviewed in Epic and correct as of today:  Health Maintenance   Topic Date Due     RSV VACCINE (Pregnancy & 60+) (1 - 1-dose 60+ series) Never done     ZOSTER IMMUNIZATION (2 of 3) 11/18/2015     ANNUAL REVIEW OF HM ORDERS  10/28/2023     MEDICARE ANNUAL WELLNESS VISIT  10/28/2023     TSH W/FREE T4 REFLEX  10/28/2023     COLORECTAL CANCER SCREENING  11/30/2023     EYE EXAM  04/25/2024     FALL RISK ASSESSMENT  11/27/2024     MAMMO SCREENING  10/05/2025     LIPID  10/28/2027     ADVANCE CARE PLANNING  11/27/2028     DEXA  11/22/2031     DTAP/TDAP/TD IMMUNIZATION (3 - Td or Tdap) 02/02/2032     HEPATITIS C SCREENING  Completed     PHQ-2 (once per calendar year)   "Completed     INFLUENZA VACCINE  Completed     Pneumococcal Vaccine: 65+ Years  Completed     COVID-19 Vaccine  Completed     IPV IMMUNIZATION  Aged Out     HPV IMMUNIZATION  Aged Out     MENINGITIS IMMUNIZATION  Aged Out     RSV MONOCLONAL ANTIBODY  Aged Out     Lab work is in process          Review of Systems   Constitutional:  Positive for chills. Negative for fever.   HENT:  Positive for congestion and hearing loss. Negative for ear pain and sore throat.    Eyes:  Positive for visual disturbance. Negative for pain.   Respiratory:  Positive for cough. Negative for shortness of breath.    Cardiovascular:  Positive for peripheral edema. Negative for chest pain and palpitations.   Gastrointestinal:  Positive for constipation. Negative for abdominal pain, diarrhea, heartburn, hematochezia and nausea.   Breasts:  Negative for tenderness, breast mass and discharge.   Genitourinary:  Positive for frequency and urgency. Negative for dysuria, genital sores, hematuria, pelvic pain, vaginal bleeding and vaginal discharge.   Musculoskeletal:  Positive for arthralgias, joint swelling and myalgias.   Skin:  Negative for rash.   Neurological:  Positive for weakness, headaches and paresthesias. Negative for dizziness.   Psychiatric/Behavioral:  Negative for mood changes. The patient is not nervous/anxious.          OBJECTIVE:   /87 (BP Location: Left arm, Patient Position: Sitting, Cuff Size: Adult Large)   Pulse 61   Temp 97.7  F (36.5  C) (Oral)   Resp 12   Ht 1.6 m (5' 3\")   Wt 100.4 kg (221 lb 6.4 oz)   SpO2 100%   BMI 39.22 kg/m   Estimated body mass index is 39.22 kg/m  as calculated from the following:    Height as of this encounter: 1.6 m (5' 3\").    Weight as of this encounter: 100.4 kg (221 lb 6.4 oz).  Physical Exam  GENERAL: healthy, alert and no distress  RESP: lungs clear to auscultation - no rales, rhonchi or wheezes  CV: regular rate and rhythm, normal S1 S2, no S3 or S4, no murmur, click or rub, no " "peripheral edema and peripheral pulses strong  MS: no gross musculoskeletal defects noted, no edema- difficulty moving from sitting to standing.Uses a cane.   PSYCH: mentation appears normal, affect normal/bright        ASSESSMENT / PLAN:   (Z00.00) Encounter for Medicare annual wellness exam  (primary encounter diagnosis)  Comment:   Plan:     (E03.9) Hypothyroidism, unspecified type  Comment:   Plan: TSH WITH FREE T4 REFLEX, levothyroxine         (SYNTHROID/LEVOTHROID) 50 MCG tablet        Labs and refills today.     (Z12.11) Screen for colon cancer  Comment:   Plan: COLOGUARD([employer])        Due for recheck    (Z13.220) Screening for hyperlipidemia  Comment:   Plan: Lipid panel reflex to direct LDL Non-fasting        Due for recheck.     (M48.061) Spinal stenosis of lumbar region, unspecified whether neurogenic claudication present  Comment:   Plan: Basic metabolic panel        On daily mobic, check kidney function and ok to refill.     (Z78.0) Asymptomatic menopausal state  Comment:   Plan: DX Hip/Pelvis/Spine        Due for recheck.     (T50.599T) Adverse effect of drug, initial encounter  Comment:   Plan: EPINEPHrine (ANY BX GENERIC EQUIV) 0.3 MG/0.3ML        injection 2-pack        Use prn.           COUNSELING:  Reviewed preventive health counseling, as reflected in patient instructions       Regular exercise       Healthy diet/nutrition       Osteoporosis prevention/bone health      BMI:   Estimated body mass index is 39.22 kg/m  as calculated from the following:    Height as of this encounter: 1.6 m (5' 3\").    Weight as of this encounter: 100.4 kg (221 lb 6.4 oz).   Weight management plan: Discussed healthy diet and exercise guidelines      She reports that she has never smoked. She has never used smokeless tobacco.      Appropriate preventive services were discussed with this patient, including applicable screening as appropriate for fall prevention, nutrition, physical activity, Tobacco-use " cessation, weight loss and cognition.  Checklist reviewing preventive services available has been given to the patient.    Reviewed patients plan of care and provided an AVS. The Basic Care Plan (routine screening as documented in Health Maintenance) for Rozina meets the Care Plan requirement. This Care Plan has been established and reviewed with the Patient.          Alisa Randall PA-C  [facility]    Identified Health Risks:  I have reviewed Opioid Use Disorder and Substance Use Disorder risk factors and made any needed referrals. The patient was provided with suggestions to help her develop a healthy physical lifestyle.  She is at risk for lack of exercise and has been provided with information to increase physical activity for the benefit of her well-being.  Information on urinary incontinence and treatment options given to patient.

## 2023-11-28 LAB
ANION GAP SERPL CALCULATED.3IONS-SCNC: 9 MMOL/L (ref 7–15)
BUN SERPL-MCNC: 18.9 MG/DL (ref 8–23)
CALCIUM SERPL-MCNC: 9.6 MG/DL (ref 8.8–10.2)
CHLORIDE SERPL-SCNC: 101 MMOL/L (ref 98–107)
CHOLEST SERPL-MCNC: 191 MG/DL
CREAT SERPL-MCNC: 0.67 MG/DL (ref 0.51–0.95)
DEPRECATED HCO3 PLAS-SCNC: 27 MMOL/L (ref 22–29)
EGFRCR SERPLBLD CKD-EPI 2021: >90 ML/MIN/1.73M2
GLUCOSE SERPL-MCNC: 89 MG/DL (ref 70–99)
HDLC SERPL-MCNC: 73 MG/DL
LDLC SERPL CALC-MCNC: 106 MG/DL
NONHDLC SERPL-MCNC: 118 MG/DL
POTASSIUM SERPL-SCNC: 4.8 MMOL/L (ref 3.4–5.3)
SODIUM SERPL-SCNC: 137 MMOL/L (ref 135–145)
TRIGL SERPL-MCNC: 58 MG/DL
TSH SERPL DL<=0.005 MIU/L-ACNC: 2.7 UIU/ML (ref 0.3–4.2)

## 2023-11-28 NOTE — RESULT ENCOUNTER NOTE
Rozina,     Your labs all look good. Your cholesterol improved from last year.   Alisa Randall PA-C

## 2023-12-16 LAB — NONINV COLON CA DNA+OCC BLD SCRN STL QL: NEGATIVE

## 2023-12-18 NOTE — RESULT ENCOUNTER NOTE
Thank you for completing your Cologuard colon cancer screening test. Your test was NEGATIVE, which means you are at low risk for developing colon cancer in the next 3 years. We will repeat this test in 3 years. Please remember if you have any blood in the stool, changes in your bowel habits or other abdominal symptoms you should be seen in clinic and your risks for colon cancer re-evaluated.   Please call the clinic with any questions.         Alisa Randall PA-C

## 2024-02-07 ENCOUNTER — TRANSFERRED RECORDS (OUTPATIENT)
Dept: HEALTH INFORMATION MANAGEMENT | Facility: CLINIC | Age: 74
End: 2024-02-07
Payer: MEDICARE

## 2024-03-12 ENCOUNTER — OFFICE VISIT (OUTPATIENT)
Dept: FAMILY MEDICINE | Facility: CLINIC | Age: 74
End: 2024-03-12
Payer: MEDICARE

## 2024-03-12 VITALS
DIASTOLIC BLOOD PRESSURE: 71 MMHG | OXYGEN SATURATION: 99 % | BODY MASS INDEX: 39.11 KG/M2 | HEART RATE: 61 BPM | SYSTOLIC BLOOD PRESSURE: 145 MMHG | TEMPERATURE: 98.2 F | WEIGHT: 220.8 LBS

## 2024-03-12 DIAGNOSIS — G47.00 DIFFICULTY STAYING ASLEEP: Primary | ICD-10-CM

## 2024-03-12 DIAGNOSIS — G89.29 CHRONIC PAIN OF RIGHT THUMB: ICD-10-CM

## 2024-03-12 DIAGNOSIS — M79.644 CHRONIC PAIN OF RIGHT THUMB: ICD-10-CM

## 2024-03-12 DIAGNOSIS — M79.7 FIBROMYALGIA: ICD-10-CM

## 2024-03-12 PROCEDURE — 99214 OFFICE O/P EST MOD 30 MIN: CPT

## 2024-03-12 RX ORDER — HYDROXYZINE HYDROCHLORIDE 25 MG/1
25 TABLET, FILM COATED ORAL 3 TIMES DAILY PRN
Qty: 60 TABLET | Refills: 1 | Status: SHIPPED | OUTPATIENT
Start: 2024-03-12 | End: 2024-03-12

## 2024-03-12 RX ORDER — HYDROXYZINE HYDROCHLORIDE 25 MG/1
25 TABLET, FILM COATED ORAL AT BEDTIME
Qty: 60 TABLET | Refills: 1 | Status: SHIPPED | OUTPATIENT
Start: 2024-03-12

## 2024-03-12 ASSESSMENT — ENCOUNTER SYMPTOMS: BACK PAIN: 1

## 2024-03-12 NOTE — PROGRESS NOTES
Assessment & Plan     Difficulty staying asleep  Has difficulty staying asleep partially due to frequent awakening for urination. Reports  passed away in July which also contributes to sleep disturbances. Plan for patient to try hydroxyzine at night for sleep. Patient reports taking this in the past and tolerated medication.   - hydrOXYzine HCl (ATARAX) 25 MG tablet; Take 1 tablet (25 mg)    Fibromyalgia  Patient has appointment with pain specialist tomorrow.     Chronic pain of right thumb  Pain and degenerative changes to right thumb. Discussed ortho referral and patient declined referral at this time.                   Kit Handy is a 73 year old, presenting for the following health issues:  Back Pain, Thumb Discomfort (Rt thumb), Sleep Problem, and Weight Problem        3/12/2024     3:24 PM   Additional Questions   Roomed by aliya werner   Pt reports thumb pain on rt hand, thumb locks- worsened over the years.  Pt would like to discuss sleep due to pain and worry.  History of Present Illness       Back Pain:  She presents for follow up of back pain. Patient's back pain is a recurring problem.  Location of back pain:  Right lower back, left lower back, right middle of back, left middle of back, right upper back and left upper back  Description of back pain: burning, dull ache and shooting  Back pain spreads: nowhere    Since patient first noticed back pain, pain is: gradually worsening  Does back pain interfere with her job:  Not applicable       Reason for visit:  Pain and tired  Symptom onset:  More than a month  Symptoms include:  Pain and tired  Symptom intensity:  Moderate  Symptom progression:  Worsening  Had these symptoms before:  Yes  Has tried/received treatment for these symptoms:  Yes  Previous treatment was successful:  No    She eats 4 or more servings of fruits and vegetables daily.She consumes 0 sweetened beverage(s) daily.She exercises with enough effort to increase her heart rate 9  or less minutes per day.  She exercises with enough effort to increase her heart rate 3 or less days per week.   She is taking medications regularly.     Lost  in July. Wakes up a lot to go to the bathroom. Wakes up every two hours.                   Objective    BP (!) 145/71 (BP Location: Left arm, Patient Position: Sitting, Cuff Size: Adult Regular)   Pulse 61   Temp 98.2  F (36.8  C) (Temporal)   Wt 100.2 kg (220 lb 12.8 oz)   SpO2 99%   BMI 39.11 kg/m    Body mass index is 39.11 kg/m .  Physical Exam   GENERAL: alert and no distress  RESP: lungs clear to auscultation - no rales, rhonchi or wheezes  CV: regular rate and rhythm, normal S1 S2, no S3 or S4, no murmur, click or rub, no peripheral edema  PSYCH: mentation appears normal, affect normal/bright            Signed Electronically by: ESTEBAN Sanchez CNP

## 2024-03-13 ENCOUNTER — TELEPHONE (OUTPATIENT)
Dept: FAMILY MEDICINE | Facility: CLINIC | Age: 74
End: 2024-03-13
Payer: MEDICARE

## 2024-03-13 ENCOUNTER — TRANSFERRED RECORDS (OUTPATIENT)
Dept: HEALTH INFORMATION MANAGEMENT | Facility: CLINIC | Age: 74
End: 2024-03-13
Payer: MEDICARE

## 2024-03-13 NOTE — TELEPHONE ENCOUNTER
Received fax from pharmacy  Drug change request  Hydroxyzine HCL 25mg tabs (white)  Drug not covered by patient plan.  The preferred alternative is   Cetirizine HCL,   Cyproheptadine hcl  Nga Perez MA on 3/13/2024 at 4:55 PM

## 2024-04-10 ENCOUNTER — TRANSFERRED RECORDS (OUTPATIENT)
Dept: HEALTH INFORMATION MANAGEMENT | Facility: CLINIC | Age: 74
End: 2024-04-10
Payer: MEDICARE

## 2024-06-20 ENCOUNTER — TELEPHONE (OUTPATIENT)
Dept: FAMILY MEDICINE | Facility: CLINIC | Age: 74
End: 2024-06-20
Payer: MEDICARE

## 2024-06-20 NOTE — LETTER
June 20, 2024    To  Rozina Bell  [address]  [place] [facility] [address]    Your team at [facility] cares about your health. We have reviewed your chart and based on our findings; we are making the following recommendations to better manage your health.     You are in particular need of attention regarding the following:     HYPERTENSION FOLLOW UP: Blood pressure check with nurse  Visit your closest [place] pharmacy for BP check  Use your home Blood Pressure monitor and call us with your results or send them through Weimi.    If you have already completed these items, please contact the clinic via phone or   Heyyhart so your care team can review and update your records. Thank you for   choosing [facility] for your healthcare needs. For any questions,   concerns, or to schedule an appointment please contact our clinic.    Healthy Regards,      Your [facility] Care Team                     Yes...

## 2024-06-20 NOTE — TELEPHONE ENCOUNTER
Patient Quality Outreach    Patient is due for the following:   Hypertension -  BP check    Next Steps:   Schedule a nurse only visit for bp check    Type of outreach:    Sent [employer] message.      Questions for provider review:    None           Yvrose Serra CMA  Chart routed to Care Team.

## 2024-09-04 ENCOUNTER — TRANSFERRED RECORDS (OUTPATIENT)
Dept: HEALTH INFORMATION MANAGEMENT | Facility: CLINIC | Age: 74
End: 2024-09-04
Payer: MEDICARE

## 2024-09-05 ENCOUNTER — TELEPHONE (OUTPATIENT)
Dept: FAMILY MEDICINE | Facility: CLINIC | Age: 74
End: 2024-09-05
Payer: MEDICARE

## 2024-09-05 NOTE — TELEPHONE ENCOUNTER
Patient Quality Outreach    Patient is due for the following:   Hypertension -  BP check    Next Steps:   Schedule a nurse only visit for bp check     Type of outreach:    Sent [email or web address] message.      Questions for provider review:    None           Yvrose Serra CMA  Chart routed to Care Team.

## 2024-09-05 NOTE — LETTER
September 5, 2024    To  Rozina Bell  [address]  [place] [facility] [address]    Your team at [facility] cares about your health. We have reviewed your chart and based on our findings; we are making the following recommendations to better manage your health.     You are in particular need of attention regarding the following:     HYPERTENSION FOLLOW UP: Blood pressure check with nurse  Visit your closest [place] pharmacy for BP check  Use your home Blood Pressure monitor and call us with your results or send them through [email or web address].    If you have already completed these items, please contact the clinic via phone or   ProHatchhart so your care team can review and update your records. Thank you for   choosing [facility] for your healthcare needs. For any questions,   concerns, or to schedule an appointment please contact our clinic.    Healthy Regards,      Your [facility] Care Team

## 2024-10-08 DIAGNOSIS — M48.00 SPINAL STENOSIS, UNSPECIFIED SPINAL REGION: ICD-10-CM

## 2024-10-08 RX ORDER — MELOXICAM 15 MG/1
15 TABLET ORAL DAILY
Qty: 90 TABLET | Refills: 4 | Status: SHIPPED | OUTPATIENT
Start: 2024-10-08

## 2024-12-04 SDOH — HEALTH STABILITY: PHYSICAL HEALTH: ON AVERAGE, HOW MANY MINUTES DO YOU ENGAGE IN EXERCISE AT THIS LEVEL?: PATIENT DECLINED

## 2024-12-04 SDOH — HEALTH STABILITY: PHYSICAL HEALTH: ON AVERAGE, HOW MANY DAYS PER WEEK DO YOU ENGAGE IN MODERATE TO STRENUOUS EXERCISE (LIKE A BRISK WALK)?: 0 DAYS

## 2024-12-04 ASSESSMENT — SOCIAL DETERMINANTS OF HEALTH (SDOH): HOW OFTEN DO YOU GET TOGETHER WITH FRIENDS OR RELATIVES?: TWICE A WEEK

## 2024-12-05 ENCOUNTER — OFFICE VISIT (OUTPATIENT)
Dept: FAMILY MEDICINE | Facility: CLINIC | Age: 74
End: 2024-12-05
Payer: MEDICARE

## 2024-12-05 VITALS
DIASTOLIC BLOOD PRESSURE: 82 MMHG | OXYGEN SATURATION: 99 % | RESPIRATION RATE: 19 BRPM | BODY MASS INDEX: 38.84 KG/M2 | TEMPERATURE: 98 F | HEART RATE: 55 BPM | SYSTOLIC BLOOD PRESSURE: 136 MMHG | HEIGHT: 63 IN | WEIGHT: 219.2 LBS

## 2024-12-05 DIAGNOSIS — M79.7 FIBROMYALGIA: ICD-10-CM

## 2024-12-05 DIAGNOSIS — M48.061 SPINAL STENOSIS OF LUMBAR REGION, UNSPECIFIED WHETHER NEUROGENIC CLAUDICATION PRESENT: ICD-10-CM

## 2024-12-05 DIAGNOSIS — E03.9 HYPOTHYROIDISM, UNSPECIFIED TYPE: ICD-10-CM

## 2024-12-05 DIAGNOSIS — M17.0 PRIMARY OSTEOARTHRITIS OF BOTH KNEES: ICD-10-CM

## 2024-12-05 DIAGNOSIS — I10 HYPERTENSION GOAL BP (BLOOD PRESSURE) < 140/90: ICD-10-CM

## 2024-12-05 DIAGNOSIS — Z00.00 ENCOUNTER FOR MEDICARE ANNUAL WELLNESS EXAM: Primary | ICD-10-CM

## 2024-12-05 DIAGNOSIS — Z78.0 ASYMPTOMATIC MENOPAUSAL STATE: ICD-10-CM

## 2024-12-05 DIAGNOSIS — E66.01 MORBID OBESITY (H): ICD-10-CM

## 2024-12-05 DIAGNOSIS — Z13.220 SCREENING FOR HYPERLIPIDEMIA: ICD-10-CM

## 2024-12-05 LAB
ANION GAP SERPL CALCULATED.3IONS-SCNC: 8 MMOL/L (ref 7–15)
BASOPHILS # BLD AUTO: 0.1 10E3/UL (ref 0–0.2)
BASOPHILS NFR BLD AUTO: 3 %
BUN SERPL-MCNC: 16.2 MG/DL (ref 8–23)
CALCIUM SERPL-MCNC: 9.8 MG/DL (ref 8.8–10.4)
CHLORIDE SERPL-SCNC: 103 MMOL/L (ref 98–107)
CHOLEST SERPL-MCNC: 205 MG/DL
CREAT SERPL-MCNC: 0.66 MG/DL (ref 0.51–0.95)
EGFRCR SERPLBLD CKD-EPI 2021: >90 ML/MIN/1.73M2
EOSINOPHIL # BLD AUTO: 0.3 10E3/UL (ref 0–0.7)
EOSINOPHIL NFR BLD AUTO: 7 %
ERYTHROCYTE [DISTWIDTH] IN BLOOD BY AUTOMATED COUNT: 13 % (ref 10–15)
FASTING STATUS PATIENT QL REPORTED: YES
FASTING STATUS PATIENT QL REPORTED: YES
GLUCOSE SERPL-MCNC: 90 MG/DL (ref 70–99)
HCO3 SERPL-SCNC: 27 MMOL/L (ref 22–29)
HCT VFR BLD AUTO: 41 % (ref 35–47)
HDLC SERPL-MCNC: 78 MG/DL
HGB BLD-MCNC: 13.3 G/DL (ref 11.7–15.7)
IMM GRANULOCYTES # BLD: 0 10E3/UL
IMM GRANULOCYTES NFR BLD: 0 %
LDLC SERPL CALC-MCNC: 117 MG/DL
LYMPHOCYTES # BLD AUTO: 0.9 10E3/UL (ref 0.8–5.3)
LYMPHOCYTES NFR BLD AUTO: 25 %
MAGNESIUM SERPL-MCNC: 2.1 MG/DL (ref 1.7–2.3)
MCH RBC QN AUTO: 30.6 PG (ref 26.5–33)
MCHC RBC AUTO-ENTMCNC: 32.4 G/DL (ref 31.5–36.5)
MCV RBC AUTO: 94 FL (ref 78–100)
MONOCYTES # BLD AUTO: 0.4 10E3/UL (ref 0–1.3)
MONOCYTES NFR BLD AUTO: 12 %
NEUTROPHILS # BLD AUTO: 1.9 10E3/UL (ref 1.6–8.3)
NEUTROPHILS NFR BLD AUTO: 53 %
NONHDLC SERPL-MCNC: 127 MG/DL
PLATELET # BLD AUTO: 258 10E3/UL (ref 150–450)
POTASSIUM SERPL-SCNC: 5.3 MMOL/L (ref 3.4–5.3)
RBC # BLD AUTO: 4.35 10E6/UL (ref 3.8–5.2)
SODIUM SERPL-SCNC: 138 MMOL/L (ref 135–145)
TRIGL SERPL-MCNC: 49 MG/DL
TSH SERPL DL<=0.005 MIU/L-ACNC: 3.11 UIU/ML (ref 0.3–4.2)
WBC # BLD AUTO: 3.5 10E3/UL (ref 4–11)

## 2024-12-05 RX ORDER — LEVOTHYROXINE SODIUM 50 UG/1
TABLET ORAL
Qty: 90 TABLET | Refills: 3 | Status: SHIPPED | OUTPATIENT
Start: 2024-12-05

## 2024-12-05 ASSESSMENT — PAIN SCALES - GENERAL: PAINLEVEL_OUTOF10: MODERATE PAIN (5)

## 2024-12-05 NOTE — PROGRESS NOTES
"Preventive Care Visit  [facility] MARINA Randall PA-C, Family Medicine  Dec 5, 2024      Assessment & Plan     Encounter for Medicare annual wellness exam    Hypertension goal BP (blood pressure) < 140/90  Stable. Monitor for now.   - BASIC METABOLIC PANEL; Future  - Magnesium; Future  - CBC with Platelets & Differential; Future  - BASIC METABOLIC PANEL  - Magnesium  - CBC with Platelets & Differential    Hypothyroidism, unspecified type  Due for recheck. Refills.   - TSH WITH FREE T4 REFLEX; Future  - levothyroxine (SYNTHROID/LEVOTHROID) 50 MCG tablet; TAKE 1 TABLET BY MOUTH EVERY DAY  - TSH WITH FREE T4 REFLEX    Primary osteoarthritis of both knees  Not well controlled. Patient to consider injections, has been told needs replacements, but has some social barriers to this. Not seeing the pain clinic anymore.     Fibromyalgia  History of trial and failure of many medications, continue on mobic which is helpful.     Morbid obesity (H)  Weight loss could help with knee pain, however exercise is difficult with chronic pain.     Spinal stenosis of lumbar region, unspecified whether neurogenic claudication present  Has worked with pain clinic in the past, no real success with interventions.     Screening for hyperlipidemia  - Lipid panel reflex to direct LDL Non-fasting; Future  - Lipid panel reflex to direct LDL Non-fasting    Asymptomatic menopausal state  - DEXA HIP/PELVIS/SPINE - Future; Future            BMI  Estimated body mass index is 38.52 kg/m  as calculated from the following:    Height as of this encounter: 1.607 m (5' 3.25\").    Weight as of this encounter: 99.4 kg (219 lb 3.2 oz).       Counseling  Appropriate preventive services were addressed with this patient via screening, questionnaire, or discussion as appropriate for fall prevention, nutrition, physical activity, Tobacco-use cessation, social engagement, weight loss and cognition.  Checklist reviewing preventive services " available has been given to the patient.  Reviewed patient's diet, addressing concerns and/or questions.   She is at risk for psychosocial distress and has been provided with information to reduce risk.   Discussed possible causes of fatigue. Information on urinary incontinence and treatment options given to patient.           Kit Handy is a 74 year old, presenting for the following:  Wellness Visit        12/5/2024     7:40 AM   Additional Questions   Roomed by deion bullard           HPI  Knee OA-  Back pain- starting to interrupt her sleep.   Using mobic daily and tylenol doesn't help much either, but does take it.   Had been seeing [facility].             Health Care Directive  Patient does not have a Health Care Directive: Discussed advance care planning with patient; however, patient declined at this time.      12/4/2024   General Health   How would you rate your overall physical health? (!) FAIR   Feel stress (tense, anxious, or unable to sleep) To some extent      (!) STRESS CONCERN      12/4/2024   Nutrition   Diet: Regular (no restrictions)            12/4/2024   Exercise   Days per week of moderate/strenous exercise 0 days   Average minutes spent exercising at this level Patient declined      (!) EXERCISE CONCERN      12/4/2024   Social Factors   Frequency of gathering with friends or relatives Twice a week   Worry food won't last until get money to buy more No   Food not last or not have enough money for food? No   Do you have housing? (Housing is defined as stable permanent housing and does not include staying ouside in a car, in a tent, in an abandoned building, in an overnight shelter, or couch-surfing.) Yes   Are you worried about losing your housing? No   Lack of transportation? No   Unable to get utilities (heat,electricity)? No            12/4/2024   Fall Risk   Fallen 2 or more times in the past year? No     No    Trouble with walking or balance? No     Yes        Patient-reported     Multiple values from one day are sorted in reverse-chronological order          12/4/2024   Activities of Daily Living- Home Safety   Needs help with the following daily activites None of the above   Safety concerns in the home None of the above            12/4/2024   Dental   Dentist two times every year? Yes            12/4/2024   Hearing Screening   Hearing concerns? None of the above            12/4/2024   Driving Risk Screening   Patient/family members have concerns about driving No            12/4/2024   General Alertness/Fatigue Screening   Have you been more tired than usual lately? (!) YES            12/4/2024   Urinary Incontinence Screening   Bothered by leaking urine in past 6 months Yes            12/4/2024   TB Screening   Were you born outside of the [place]? No            Today's PHQ-2 Score:       12/4/2024    10:38 AM   PHQ-2 ( 1999 [employer])   Q1: Little interest or pleasure in doing things 0    Q2: Feeling down, depressed or hopeless 0    PHQ-2 Score 0    Q1: Little interest or pleasure in doing things Not at all   Q2: Feeling down, depressed or hopeless Not at all   PHQ-2 Score 0       Patient-reported           12/4/2024   Substance Use   Alcohol more than 3/day or more than 7/wk No   Do you have a current opioid prescription? No   How severe/bad is pain from 1 to 10? 8/10   Do you use any other substances recreationally? No        Social History     Tobacco Use    Smoking status: Never     Passive exposure: Never    Smokeless tobacco: Never    Tobacco comments:     Lives in smoke free household   Vaping Use    Vaping status: Never Used   Substance Use Topics    Alcohol use: No    Drug use: No          Mammogram Screening - Mammogram every 1-2 years updated in Health Maintenance based on mutual decision making    ASCVD Risk   The 10-year ASCVD risk score (Kristin LOPEZ, et al., 2019) is: 15.1%    Values used to calculate the score:      Age: 74 years      Sex: Female      Is Non-  ": No      Diabetic: No      Tobacco smoker: No      Systolic Blood Pressure: 141 mmHg      Is BP treated: No      HDL Cholesterol: 73 mg/dL      Total Cholesterol: 191 mg/dL            Reviewed and updated as needed this visit by Provider   Tobacco  Allergies  Meds  Problems  Med Hx  Surg Hx  Fam Hx              Current providers sharing in care for this patient include:  Patient Care Team:  Ana Qiu MD as PCP - General (Family Medicine)  Jesse Mckeon MD as MD (Specialist)  Emmanuel Max MD as MD (Orthopedics)  Alisa Randall PA-C as Assigned PCP    The following health maintenance items are reviewed in Epic and correct as of today:  Health Maintenance   Topic Date Due    ZOSTER IMMUNIZATION (2 of 3) 11/18/2015    DEXA  11/22/2018    BMP  11/27/2024    LIPID  11/27/2024    ANNUAL REVIEW OF HM ORDERS  11/27/2024    TSH W/FREE T4 REFLEX  11/27/2024    EYE EXAM  04/26/2025    MAMMO SCREENING  10/05/2025    MEDICARE ANNUAL WELLNESS VISIT  12/05/2025    FALL RISK ASSESSMENT  12/05/2025    GLUCOSE  11/27/2026    COLORECTAL CANCER SCREENING  12/07/2026    ADVANCE CARE PLANNING  11/27/2028    DTAP/TDAP/TD IMMUNIZATION (3 - Td or Tdap) 02/02/2032    HEPATITIS C SCREENING  Completed    PHQ-2 (once per calendar year)  Completed    INFLUENZA VACCINE  Completed    Pneumococcal Vaccine: 65+ Years  Completed    RSV VACCINE  Completed    COVID-19 Vaccine  Completed    HPV IMMUNIZATION  Aged Out    MENINGITIS IMMUNIZATION  Aged Out    RSV MONOCLONAL ANTIBODY  Aged Out            Objective    Exam  BP (!) 141/81 (BP Location: Left arm, Patient Position: Chair, Cuff Size: Adult Large)   Pulse 55   Temp 98  F (36.7  C) (Temporal)   Resp 19   Ht 1.607 m (5' 3.25\")   Wt 99.4 kg (219 lb 3.2 oz)   SpO2 99%   BMI 38.52 kg/m     Estimated body mass index is 38.52 kg/m  as calculated from the following:    Height as of this encounter: 1.607 m (5' 3.25\").    Weight as " of this encounter: 99.4 kg (219 lb 3.2 oz).    Physical Exam  GENERAL: alert and no distress  EYES: Eyes grossly normal to inspection, PERRL and conjunctivae and sclerae normal  HENT: ear canals and TM's normal, nose and mouth without ulcers or lesions  NECK: no adenopathy, no asymmetry, masses, or scars  RESP: lungs clear to auscultation - no rales, rhonchi or wheezes  CV: regular rate and rhythm, normal S1 S2, no S3 or S4, no murmur, click or rub, no peripheral edema  ABDOMEN: soft, nontender, no hepatosplenomegaly, no masses   MS: no gross musculoskeletal defects noted, no edema- antalgic gait, uses a cane.   SKIN: no suspicious lesions or rashes  NEURO: Normal strength and tone, mentation intact and speech normal  PSYCH: mentation appears normal, affect normal/bright        12/5/2024   Mini Cog   Clock Draw Score 2 Normal   3 Item Recall 3 objects recalled   Mini Cog Total Score 5                 Signed Electronically by: Alisa Randall PA-C

## 2024-12-05 NOTE — PATIENT INSTRUCTIONS
Schedule bone density exam    Please follow up at your PHARMACY for the following vaccines:  Shingles (Shingrix)        Patient Education   Preventive Care Advice   This is general advice given by our system to help you stay healthy. However, your care team may have specific advice just for you. Please talk to your care team about your preventive care needs.  Nutrition  Eat 5 or more servings of fruits and vegetables each day.  Try wheat bread, brown rice and whole grain pasta (instead of white bread, rice, and pasta).  Get enough calcium and vitamin D. Check the label on foods and aim for 100% of the RDA (recommended daily allowance).  Lifestyle  Exercise at least 150 minutes each week  (30 minutes a day, 5 days a week).  Do muscle strengthening activities 2 days a week. These help control your weight and prevent disease.  No smoking.  Wear sunscreen to prevent skin cancer.  Have a dental exam and cleaning every 6 months.  Yearly exams  See your health care team every year to talk about:  Any changes in your health.  Any medicines your care team has prescribed.  Preventive care, family planning, and ways to prevent chronic diseases.  Shots (vaccines)   HPV shots (up to age 26), if you've never had them before.  Hepatitis B shots (up to age 59), if you've never had them before.  COVID-19 shot: Get this shot when it's due.  Flu shot: Get a flu shot every year.  Tetanus shot: Get a tetanus shot every 10 years.  Pneumococcal, hepatitis A, and RSV shots: Ask your care team if you need these based on your risk.  Shingles shot (for age 50 and up)  General health tests  Diabetes screening:  Starting at age 35, Get screened for diabetes at least every 3 years.  If you are younger than age 35, ask your care team if you should be screened for diabetes.  Cholesterol test: At age 39, start having a cholesterol test every 5 years, or more often if advised.  Bone density scan (DEXA): At age 50, ask your care team if you should  have this scan for osteoporosis (brittle bones).  Hepatitis C: Get tested at least once in your life.  STIs (sexually transmitted infections)  Before age 24: Ask your care team if you should be screened for STIs.  After age 24: Get screened for STIs if you're at risk. You are at risk for STIs (including HIV) if:  You are sexually active with more than one person.  You don't use condoms every time.  You or a partner was diagnosed with a sexually transmitted infection.  If you are at risk for HIV, ask about PrEP medicine to prevent HIV.  Get tested for HIV at least once in your life, whether you are at risk for HIV or not.  Cancer screening tests  Cervical cancer screening: If you have a cervix, begin getting regular cervical cancer screening tests starting at age 21.  Breast cancer scan (mammogram): If you've ever had breasts, begin having regular mammograms starting at age 40. This is a scan to check for breast cancer.  Colon cancer screening: It is important to start screening for colon cancer at age 45.  Have a colonoscopy test every 10 years (or more often if you're at risk) Or, ask your provider about stool tests like a FIT test every year or Cologuard test every 3 years.  To learn more about your testing options, visit:   .  For help making a decision, visit:   https://bit.ly/mo54337.  Prostate cancer screening test: If you have a prostate, ask your care team if a prostate cancer screening test (PSA) at age 55 is right for you.  Lung cancer screening: If you are a current or former smoker ages 50 to 80, ask your care team if ongoing lung cancer screenings are right for you.  For informational purposes only. Not to replace the advice of your health care provider. Copyright   2023 [employer]. All rights reserved. Clinically reviewed by the [facility]. [employer] [id number] - REV 01/24.  Learning About Stress  What is stress?     Stress is your body's response to a hard  situation. Your body can have a physical, emotional, or mental response. Stress is a fact of life for most people, and it affects everyone differently. What causes stress for you may not be stressful for someone else.  A lot of things can cause stress. You may feel stress when you go on a job interview, take a test, or run a race. This kind of short-term stress is normal and even useful. It can help you if you need to work hard or react quickly. For example, stress can help you finish an important job on time.  Long-term stress is caused by ongoing stressful situations or events. Examples of long-term stress include long-term health problems, ongoing problems at work, or conflicts in your family. Long-term stress can harm your health.  How does stress affect your health?  When you are stressed, your body responds as though you are in danger. It makes hormones that speed up your heart, make you breathe faster, and give you a burst of energy. This is called the fight-or-flight stress response. If the stress is over quickly, your body goes back to normal and no harm is done.  But if stress happens too often or lasts too long, it can have bad effects. Long-term stress can make you more likely to get sick, and it can make symptoms of some diseases worse. If you tense up when you are stressed, you may develop neck, shoulder, or low back pain. Stress is linked to high blood pressure and heart disease.  Stress also harms your emotional health. It can make you mendoza, tense, or depressed. Your relationships may suffer, and you may not do well at work or school.  What can you do to manage stress?  You can try these things to help manage stress:   Do something active. Exercise or activity can help reduce stress. Walking is a great way to get started. Even everyday activities such as housecleaning or yard work can help.  Try yoga or baldomero chi. These techniques combine exercise and meditation. You may need some training at first to  learn them.  Do something you enjoy. For example, listen to music or go to a movie. Practice your hobby or do volunteer work.  Meditate. This can help you relax, because you are not worrying about what happened before or what may happen in the future.  Do guided imagery. Imagine yourself in any setting that helps you feel calm. You can use online videos, books, or a teacher to guide you.  Do breathing exercises. For example:  From a standing position, bend forward from the waist with your knees slightly bent. Let your arms dangle close to the floor.  Breathe in slowly and deeply as you return to a standing position. Roll up slowly and lift your head last.  Hold your breath for just a few seconds in the standing position.  Breathe out slowly and bend forward from the waist.  Let your feelings out. Talk, laugh, cry, and express anger when you need to. Talking with supportive friends or family, a counselor, or a noris leader about your feelings is a healthy way to relieve stress. Avoid discussing your feelings with people who make you feel worse.  Write. It may help to write about things that are bothering you. This helps you find out how much stress you feel and what is causing it. When you know this, you can find better ways to cope.  What can you do to prevent stress?  You might try some of these things to help prevent stress:  Manage your time. This helps you find time to do the things you want and need to do.  Get enough sleep. Your body recovers from the stresses of the day while you are sleeping.  Get support. Your family, friends, and community can make a difference in how you experience stress.  Limit your news feed. Avoid or limit time on social media or news that may make you feel stressed.  Do something active. Exercise or activity can help reduce stress. Walking is a great way to get started.  Where can you learn more?  Go to https://www.healthwise.net/patiented  Enter N032 in the search box to learn more  "about \"Learning About Stress.\"  Current as of: October 24, 2023  Content Version: 14.2 2024 [facility] [employer].   Care instructions adapted under license by your healthcare professional. If you have questions about a medical condition or this instruction, always ask your healthcare professional. Healthwise, Incorporated disclaims any warranty or liability for your use of this information.    Learning About Sleeping Well  What does sleeping well mean?     Sleeping well means getting enough sleep to feel good and stay healthy. How much sleep is enough varies among people.  The number of hours you sleep and how you feel when you wake up are both important. If you do not feel refreshed, you probably need more sleep. Another sign of not getting enough sleep is feeling tired during the day.  Experts recommend that adults get at least 7 or more hours of sleep per day. Children and older adults need more sleep.  Why is getting enough sleep important?  Getting enough quality sleep is a basic part of good health. When your sleep suffers, your physical health, mood, and your thoughts can suffer too. You may find yourself feeling more grumpy or stressed. Not getting enough sleep also can lead to serious problems, including injury, accidents, anxiety, and depression.  What might cause poor sleeping?  Many things can cause sleep problems, including:  Changes to your sleep schedule.  Stress. Stress can be caused by fear about a single event, such as giving a speech. Or you may have ongoing stress, such as worry about work or school.  Depression, anxiety, and other mental or emotional conditions.  Changes in your sleep habits or surroundings. This includes changes that happen where you sleep, such as noise, light, or sleeping in a different bed. It also includes changes in your sleep pattern, such as having jet lag or working a late shift.  Health problems, such as pain, breathing problems, and restless legs syndrome.  Lack " "of regular exercise.  Using alcohol, nicotine, or caffeine before bed.  How can you help yourself?  Here are some tips that may help you sleep more soundly and wake up feeling more refreshed.  Your sleeping area   Use your bedroom only for sleeping and sex. A bit of light reading may help you fall asleep. But if it doesn't, do your reading elsewhere in the house. Try not to use your TV, computer, smartphone, or tablet while you are in bed.  Be sure your bed is big enough to stretch out comfortably, especially if you have a sleep partner.  Keep your bedroom quiet, dark, and cool. Use curtains, blinds, or a sleep mask to block out light. To block out noise, use earplugs, soothing music, or a \"white noise\" machine.  Your evening and bedtime routine   Create a relaxing bedtime routine. You might want to take a warm shower or bath, or listen to soothing music.  Go to bed at the same time every night. And get up at the same time every morning, even if you feel tired.  What to avoid   Limit caffeine (coffee, tea, caffeinated sodas) during the day, and don't have any for at least 6 hours before bedtime.  Avoid drinking alcohol before bedtime. Alcohol can cause you to wake up more often during the night.  Try not to smoke or use tobacco, especially in the evening. Nicotine can keep you awake.  Limit naps during the day, especially close to bedtime.  Avoid lying in bed awake for too long. If you can't fall asleep or if you wake up in the middle of the night and can't get back to sleep within about 20 minutes, get out of bed and go to another room until you feel sleepy.  Avoid taking medicine right before bed that may keep you awake or make you feel hyper or energized. Your doctor can tell you if your medicine may do this and if you can take it earlier in the day.  If you can't sleep   Imagine yourself in a peaceful, pleasant scene. Focus on the details and feelings of being in a place that is relaxing.  Get up and do a quiet " "or boring activity until you feel sleepy.  Avoid drinking any liquids before going to bed to help prevent waking up often to use the bathroom.  Where can you learn more?  Go to https://www.[employer].net/patiented  Enter J942 in the search box to learn more about \"Learning About Sleeping Well.\"  Current as of: July 10, 2023  Content Version: 14.2 2024 [facility].   Care instructions adapted under license by your healthcare professional. If you have questions about a medical condition or this instruction, always ask your healthcare professional. Healthwise, Incorporated disclaims any warranty or liability for your use of this information.    Bladder Training: Care Instructions  Your Care Instructions     Bladder training is used to treat urge incontinence and stress incontinence. Urge incontinence means that the need to urinate comes on so fast that you can't get to a toilet in time. Stress incontinence means that you leak urine because of pressure on your bladder. For example, it may happen when you laugh, cough, or lift something heavy.  Bladder training can increase how long you can wait before you have to urinate. It can also help your bladder hold more urine. And it can give you better control over the urge to urinate.  It is important to remember that bladder training takes a few weeks to a few months to make a difference. You may not see results right away, but don't give up.  Follow-up care is a key part of your treatment and safety. Be sure to make and go to all appointments, and call your doctor if you are having problems. It's also a good idea to know your test results and keep a list of the medicines you take.  How can you care for yourself at home?  Work with your doctor to come up with a bladder training program that is right for you. You may use one or more of the following methods.  Delayed urination  In the beginning, try to keep from urinating for 5 minutes after you first feel the " "need to go.  While you wait, take deep, slow breaths to relax. Kegel exercises can also help you delay the need to go to the bathroom.  After some practice, when you can easily wait 5 minutes to urinate, try to wait 10 minutes before you urinate.  Slowly increase the waiting period until you are able to control when you have to urinate.  Scheduled urination  Empty your bladder when you first wake up in the morning.  Schedule times throughout the day when you will urinate.  Start by going to the bathroom every hour, even if you don't need to go.  Slowly increase the time between trips to the bathroom.  When you have found a schedule that works well for you, keep doing it.  If you wake up during the night and have to urinate, do it. Apply your schedule to waking hours only.  Kegel exercises  These tighten and strengthen pelvic muscles, which can help you control the flow of urine. (If doing these exercises causes pain, stop doing them and talk with your doctor.) To do Kegel exercises:  Squeeze your muscles as if you were trying not to pass gas. Or squeeze your muscles as if you were stopping the flow of urine. Your belly, legs, and buttocks shouldn't move.  Hold the squeeze for 3 seconds, then relax for 5 to 10 seconds.  Start with 3 seconds, then add 1 second each week until you are able to squeeze for 10 seconds.  Repeat the exercise 10 times a session. Do 3 to 8 sessions a day.  When should you call for help?  Watch closely for changes in your health, and be sure to contact your doctor if:    Your incontinence is getting worse.     You do not get better as expected.   Where can you learn more?  Go to https://www.healthYoomba.net/patiented  Enter V684 in the search box to learn more about \"Bladder Training: Care Instructions.\"  Current as of: November 15, 2023  Content Version: 14.2 2024 [employer].   Care instructions adapted under license by your healthcare professional. If you have questions about a " medical condition or this instruction, always ask your healthcare professional. Healthwise, Incorporated disclaims any warranty or liability for your use of this information.        5

## 2024-12-06 ENCOUNTER — MYC MEDICAL ADVICE (OUTPATIENT)
Dept: FAMILY MEDICINE | Facility: CLINIC | Age: 74
End: 2024-12-06
Payer: MEDICARE

## 2024-12-06 DIAGNOSIS — E78.5 HYPERLIPIDEMIA LDL GOAL <100: Primary | ICD-10-CM

## 2024-12-09 RX ORDER — ROSUVASTATIN CALCIUM 5 MG/1
5 TABLET, COATED ORAL DAILY
Qty: 90 TABLET | Refills: 1 | Status: SHIPPED | OUTPATIENT
Start: 2024-12-09

## 2024-12-13 ENCOUNTER — ANCILLARY PROCEDURE (OUTPATIENT)
Dept: BONE DENSITY | Facility: CLINIC | Age: 74
End: 2024-12-13
Attending: PHYSICIAN ASSISTANT
Payer: MEDICARE

## 2024-12-13 DIAGNOSIS — Z78.0 ASYMPTOMATIC MENOPAUSAL STATE: ICD-10-CM

## 2024-12-13 PROCEDURE — 77080 DXA BONE DENSITY AXIAL: CPT | Mod: TC | Performed by: PHYSICIAN ASSISTANT

## 2024-12-13 NOTE — RESULT ENCOUNTER NOTE
Your bone mass is mildly low, called osteopenia. This has worsened since your last DEXA test.     We can continue to follow this by repeating the DEXA test in 2 years.  Get several servings of dairy daily (or calcium 1000 - 1200 mg daily split into 2 doses), take vitamin D 1000 units daily over-the-counter, exercise regularly, and avoid falls.      Ana Qiu MD

## 2025-01-30 ENCOUNTER — OFFICE VISIT (OUTPATIENT)
Dept: FAMILY MEDICINE | Facility: CLINIC | Age: 75
End: 2025-01-30
Payer: MEDICARE

## 2025-01-30 VITALS
SYSTOLIC BLOOD PRESSURE: 130 MMHG | WEIGHT: 223 LBS | DIASTOLIC BLOOD PRESSURE: 90 MMHG | HEART RATE: 57 BPM | OXYGEN SATURATION: 100 % | TEMPERATURE: 98.3 F | RESPIRATION RATE: 12 BRPM | HEIGHT: 63 IN | BODY MASS INDEX: 39.51 KG/M2

## 2025-01-30 DIAGNOSIS — M16.11 PRIMARY OSTEOARTHRITIS OF RIGHT HIP: ICD-10-CM

## 2025-01-30 DIAGNOSIS — E87.5 HYPERKALEMIA: ICD-10-CM

## 2025-01-30 DIAGNOSIS — E03.9 HYPOTHYROIDISM, UNSPECIFIED TYPE: ICD-10-CM

## 2025-01-30 DIAGNOSIS — I10 HYPERTENSION GOAL BP (BLOOD PRESSURE) < 140/90: ICD-10-CM

## 2025-01-30 DIAGNOSIS — Z01.818 PREOP GENERAL PHYSICAL EXAM: Primary | ICD-10-CM

## 2025-01-30 PROBLEM — E66.01 MORBID OBESITY (H): Status: RESOLVED | Noted: 2017-08-16 | Resolved: 2025-01-30

## 2025-01-30 LAB
ALBUMIN SERPL BCG-MCNC: 4.4 G/DL (ref 3.5–5.2)
ALBUMIN UR-MCNC: NEGATIVE MG/DL
ALP SERPL-CCNC: 93 U/L (ref 40–150)
ALT SERPL W P-5'-P-CCNC: 21 U/L (ref 0–50)
ANION GAP SERPL CALCULATED.3IONS-SCNC: 8 MMOL/L (ref 7–15)
APPEARANCE UR: CLEAR
AST SERPL W P-5'-P-CCNC: 25 U/L (ref 0–45)
BILIRUB SERPL-MCNC: 0.5 MG/DL
BILIRUB UR QL STRIP: NEGATIVE
BUN SERPL-MCNC: 24.7 MG/DL (ref 8–23)
CALCIUM SERPL-MCNC: 9.9 MG/DL (ref 8.8–10.4)
CHLORIDE SERPL-SCNC: 103 MMOL/L (ref 98–107)
COLOR UR AUTO: YELLOW
CREAT SERPL-MCNC: 0.73 MG/DL (ref 0.51–0.95)
EGFRCR SERPLBLD CKD-EPI 2021: 86 ML/MIN/1.73M2
ERYTHROCYTE [DISTWIDTH] IN BLOOD BY AUTOMATED COUNT: 13.7 % (ref 10–15)
GLUCOSE SERPL-MCNC: 93 MG/DL (ref 70–99)
GLUCOSE UR STRIP-MCNC: NEGATIVE MG/DL
HCO3 SERPL-SCNC: 28 MMOL/L (ref 22–29)
HCT VFR BLD AUTO: 39.3 % (ref 35–47)
HGB BLD-MCNC: 12.8 G/DL (ref 11.7–15.7)
HGB UR QL STRIP: NEGATIVE
KETONES UR STRIP-MCNC: NEGATIVE MG/DL
LEUKOCYTE ESTERASE UR QL STRIP: ABNORMAL
MCH RBC QN AUTO: 31.1 PG (ref 26.5–33)
MCHC RBC AUTO-ENTMCNC: 32.6 G/DL (ref 31.5–36.5)
MCV RBC AUTO: 95 FL (ref 78–100)
NITRATE UR QL: NEGATIVE
PH UR STRIP: 7 [PH] (ref 5–7)
PLATELET # BLD AUTO: 250 10E3/UL (ref 150–450)
POTASSIUM SERPL-SCNC: 5.5 MMOL/L (ref 3.4–5.3)
PROT SERPL-MCNC: 7 G/DL (ref 6.4–8.3)
RBC # BLD AUTO: 4.12 10E6/UL (ref 3.8–5.2)
RBC #/AREA URNS AUTO: ABNORMAL /HPF
SODIUM SERPL-SCNC: 139 MMOL/L (ref 135–145)
SP GR UR STRIP: 1.01 (ref 1–1.03)
SQUAMOUS #/AREA URNS AUTO: ABNORMAL /LPF
UROBILINOGEN UR STRIP-ACNC: 0.2 E.U./DL
WBC # BLD AUTO: 4 10E3/UL (ref 4–11)
WBC #/AREA URNS AUTO: ABNORMAL /HPF

## 2025-01-30 NOTE — PATIENT INSTRUCTIONS
How to Take Your Medication Before Surgery  Preoperative Medication Instructions   Antiplatelet or Anticoagulation Medication Instructions   - Patient is on no antiplatelet or anticoagulation medications.    Additional Medication Instructions  Take all scheduled medications on the day of surgery EXCEPT for modifications listed below:   - Herbal medications and vitamins: DO NOT TAKE 14 days prior to surgery.   - meloxicam (Mobic): DO NOT TAKE 10 days before surgery.   Stop 25     Ok to take crestor (cholesterol medication) and levothyroxine with a small sip of water the morning or surgery.       Tylenol 1000mg every 8 hours.   Hibiclens soap  Patient Education   Preparing for Your Surgery  For Adults  Getting started  In most cases, a nurse will call to review your health history and instructions. They will give you an arrival time based on your scheduled surgery time. Please be ready to share:  Your doctor's clinic name and phone number  Your medical, surgical, and anesthesia history  A list of allergies and sensitivities  A list of medicines, including herbal treatments and over-the-counter drugs  Whether the patient has a legal guardian (ask how to send us the papers in advance)  Note: You may not receive a call if you were seen at our PAC (Preoperative Assessment Center).  Please tell us if you're pregnant--or if there's any chance you might be pregnant. Some surgeries may injure a fetus (unborn baby), so they require a pregnancy test. Surgeries that are safe for a fetus don't always need a test, and you can choose whether to have one.   Preparing for surgery  Within 10 to 30 days of surgery: Have a pre-op exam (sometimes called an H&P, or History and Physical). This can be done at a clinic or pre-operative center.  If you're having a , you may not need this exam. Talk to your care team.  At your pre-op exam, talk to your care team about all medicines you take. (This includes CBD oil and any drugs,  such as THC, marijuana, and other forms of cannabis.) If you need to stop any medicine before surgery, ask when to start taking it again.  This is for your safety. Many medicines and drugs can make you bleed too much during surgery. Some change how well surgery (anesthesia) drugs work.  Call your insurance company to let them know you're having surgery. (If you don't have insurance, call [phone].)  Call your clinic if there's any change in your health. This includes a scrape or scratch near the surgery site, or any signs of a cold (sore throat, runny nose, cough, rash, fever).  Eating and drinking guidelines  For your safety: Unless your surgeon tells you otherwise, follow the guidelines below.  Eat and drink as normal until 8 hours before you arrive for surgery. After that, no food or milk. You can spit out gum when you arrive.  Drink clear liquids until 2 hours before you arrive. These are liquids you can see through, like water, Gatorade, and Propel Water. They also include plain black coffee and tea (no cream or milk).  No alcohol for 24 hours before you arrive. The night before surgery, stop any drinks that contain THC.  If your care team tells you to take medicine on the morning of surgery, it's okay to take it with a sip of water. No other medicines or drugs are allowed (including CBD oil)--follow your care team's instructions.  If you have questions the day of surgery, call your hospital or surgery center.   Preventing infection  Shower or bathe the night before and the morning of surgery. Follow the instructions your clinic gave you. (If no instructions, use regular soap.)  Don't shave or clip hair near your surgery site. We'll remove the hair if needed.  Don't smoke or vape the morning of surgery. No chewing tobacco for 6 hours before you arrive. A nicotine patch is okay. You may spit out nicotine gum when you arrive.  For some surgeries, the surgeon will tell you to fully quit smoking and  nicotine.  We will make every effort to keep you safe from infection. We will:  Clean our hands often with soap and water (or an alcohol-based hand rub).  Clean the skin at your surgery site with a special soap that kills germs.  Give you a special gown to keep you warm. (Cold raises the risk of infection.)  Wear hair covers, masks, gowns, and gloves during surgery.  Give antibiotic medicine, if prescribed. Not all surgeries need this medicine.  What to bring on the day of surgery  Photo ID and insurance card  Copy of your health care directive, if you have one  Glasses and hearing aids (bring cases)  You can't wear contacts during surgery  Inhaler and eye drops, if you use them (tell us about these when you arrive)  CPAP machine or breathing device, if you use them  A few personal items, if spending the night  If you have . . .  A pacemaker, ICD (cardiac defibrillator), or other implant: Bring the ID card.  An implanted stimulator: Bring the remote control.  A legal guardian: Bring a copy of the certified (court-stamped) guardianship papers.  Please remove any jewelry, including body piercings. Leave jewelry and other valuables at home.  If you're going home the day of surgery  You must have a responsible adult drive you home. They should stay with you overnight as well.  If you don't have someone to stay with you, and you aren't safe to go home alone, we may keep you overnight. Insurance often won't pay for this.  After surgery  If it's hard to control your pain or you need more pain medicine, please call your surgeon's office.  Questions?   If you have any questions for your care team, list them here:   ____________________________________________________________________________________________________________________________________________________________________________________________________________________________________________________________  For informational purposes only. Not to replace the advice of  your health care provider. Copyright   2003, 2019 [facility]. All rights reserved. Clinically reviewed by Alverto Castellon MD. "[employer]" [id number] - REV 08/24.

## 2025-01-30 NOTE — PROGRESS NOTES
Preoperative Evaluation  [facility]  [address]  [place] [address]  Phone: [phone]  Primary Provider: Alisa Randall PA-C  Pre-op Performing Provider: Alisa Randall PA-C  Jan 30, 2025 1/30/2025   Surgical Information   What procedure is being done? Right hip replacement   Facility or Hospital where procedure/surgery will be performed: [facility]   Who is doing the procedure / surgery? Dr. Samuel Cash   Date of surgery / procedure: Febraury 18   Time of surgery / procedure:  0   Where do you plan to recover after surgery? at home with family     Fax number for surgical facility: [phone]    Assessment & Plan     The proposed surgical procedure is considered INTERMEDIATE risk.    Preop general physical exam  - UA Macroscopic with reflex to Microscopic and Culture; Future  - Comprehensive metabolic panel; Future  - CBC with platelets; Future  - UA Macroscopic with reflex to Microscopic and Culture  - Comprehensive metabolic panel  - CBC with platelets  - UA Microscopic with Reflex to Culture    Primary osteoarthritis of right hip  Hypothyroidism, unspecified type    Hypertension goal BP (blood pressure) < 140/90  Slightly high today, patient in pain today. Ok for surgery.   - Comprehensive metabolic panel; Future  - Comprehensive metabolic panel    The longitudinal plan of care for the diagnosis(es)/condition(s) as documented were addressed during this visit. Due to the added complexity in care, I will continue to support Rozina in the subsequent management and with ongoing continuity of care.        - No identified additional risk factors other than previously addressed    Preoperative Medication Instructions  Antiplatelet or Anticoagulation Medication Instructions   - Patient is on no antiplatelet or anticoagulation medications.    Additional Medication Instructions  Take all scheduled medications on the day of surgery EXCEPT for modifications listed  below:   - Herbal medications and vitamins: DO NOT TAKE 14 days prior to surgery.   - meloxicam (Mobic): DO NOT TAKE 10 days before surgery.   Stop 2/8/25    Recommendation  Approval given to proceed with proposed procedure pending review of diagnostic evaluation.    Kit Handy is a 74 year old, presenting for the following:  Pre-Op Exam          1/30/2025     8:33 AM   Additional Questions   Roomed by Puneet JUARES related to upcoming procedure: Right hip osteoarthritis requiring repair.         1/30/2025   Pre-Op Questionnaire   Have you ever had a heart attack or stroke? No   Have you ever had surgery on your heart or blood vessels, such as a stent placement, a coronary artery bypass, or surgery on an artery in your head, neck, heart, or legs? No   Do you have chest pain with activity? No   Do you have a history of heart failure? No   Do you currently have a cold, bronchitis or symptoms of other infection? No   Do you have a cough, shortness of breath, or wheezing? No   Do you or anyone in your family have previous history of blood clots? (!) UNKNOWN    Do you or does anyone in your family have a serious bleeding problem such as prolonged bleeding following surgeries or cuts? (!) UNKNOWN    Have you ever had problems with anemia or been told to take iron pills? No   Have you had any abnormal blood loss such as black, tarry or bloody stools, or abnormal vaginal bleeding? No   Have you ever had a blood transfusion? No   Are you willing to have a blood transfusion if it is medically needed before, during, or after your surgery? Yes   Have you or any of your relatives ever had problems with anesthesia? (!) YES-patient    Do you have sleep apnea, excessive snoring or daytime drowsiness? No   Do you have any artifical heart valves or other implanted medical devices like a pacemaker, defibrillator, or continuous glucose monitor? No   Do you have artificial joints? No   Are you allergic to latex? (!) YES      Health Care Directive  Patient does not have a Health Care Directive: Patient states has Advance Directive and will bring in a copy to clinic.    Preoperative Review of    reviewed - no record of controlled substances prescribed.          Patient Active Problem List    Diagnosis Date Noted    Primary osteoarthritis of both knees 02/13/2023     Priority: Medium     TCO      Primary osteoarthritis of both ankles 11/07/2018     Priority: Medium    Hypomagnesemia 11/07/2018     Priority: Medium    Primary osteoarthritis of both first carpometacarpal joints 02/08/2016     Priority: Medium    Lumbar spinal stenosis 04/22/2014     Priority: Medium    Fibromyalgia 07/29/2011     Priority: Medium     Tramadol doesn't work.  Has been on propoxyphene, vicoden, mobic, celebrex, tylenol,   Codeine is ineffective and makes her sick.      Hypertension goal BP (blood pressure) < 140/90 07/29/2011     Priority: Medium     Home monitor calibrated and accurate September 25, 2015        Hypothyroidism      Priority: Medium      Past Medical History:   Diagnosis Date    Allergies     Endometriosis     Fibromyalgia     Hypertension goal BP (blood pressure) < 140/90 07/29/2011    Home monitor calibrated and accurate September 25, 2015     Hypomagnesemia     Hypothyroidism     Hypovitaminosis D 08/18/2015    Lumbar spinal stenosis 04/22/2014    MMT (medial meniscus tear) 09/11/2013    Morbid obesity (H) 08/16/2017    Osteoarthritis     Rheum- Dr. Ferrer    Primary osteoarthritis of both ankles 11/07/2018    Primary osteoarthritis of both first carpometacarpal joints 02/08/2016    Primary osteoarthritis of both knees 2/13/2023    Scoliosis     Skin cancer 08/08/2012     Past Surgical History:   Procedure Laterality Date    BREAST BIOPSY, RT/LT  08/01/2008    Breat Biopsy RT/LT    HC REMOVAL GALLBLADDER  age 31    KNEE ARTHROSCOPY W/ DEBRIDEMENT Bilateral     ZZC VAG HYST,RMV TUBE/OVARY  age 34    endometriosis     Current  Outpatient Medications   Medication Sig Dispense Refill    Ascorbic Acid (VITAMIN C PO) Take 500 mg by mouth daily      CALCIUM + D 600-200 MG-UNIT OR TABS 1 TABLET TWICE  DAILY      Cholecalciferol (VITAMIN D) 1000 UNITS capsule Take 2,000 Units by mouth daily 180 capsule 4    EPINEPHrine (ANY BX GENERIC EQUIV) 0.3 MG/0.3ML injection 2-pack INJECT 0.3ML IN THE MUSCLE ONCE FOR 1 DOSE 2 each 1    GLUCOSAMINE CHONDROITIN OR TABS 1 TABLET DAILY      hydrOXYzine HCl (ATARAX) 25 MG tablet Take 1 tablet (25 mg) by mouth at bedtime 60 tablet 1    levothyroxine (SYNTHROID/LEVOTHROID) 50 MCG tablet TAKE 1 TABLET BY MOUTH EVERY DAY 90 tablet 3    magnesium oxide 400 MG CAPS Take 1 tablet by mouth 3 times daily 270 capsule 4    meloxicam (MOBIC) 15 MG tablet TAKE 1 TABLET(15 MG) BY MOUTH DAILY 90 tablet 4    MULTI-VITAMIN OR None Entered      rosuvastatin (CRESTOR) 5 MG tablet Take 1 tablet (5 mg) by mouth daily. 90 tablet 1    VITAMIN E OR 1 TABLET DAILY         Allergies   Allergen Reactions    Depo-Medrol [Methylprednisolone]      Specifically Methylprednisolone acetate. May substitute with methylprednisolone succinate or other IV/IM steroid medication.    Amitriptyline      jittery    Celebrex [Emery-2 Inhibitors (Sulfonamide)]     Latex     Lidocaine-Epinephrine (Pf) Other (See Comments)     Palpitations, flushing    Morphine     Pcn [Penicillins]     Sulfa Antibiotics     Ultram [Tramadol Hcl]     Yellow Dye      Can't remember, but ends up in ER with reaction    Zithromax [Azithromycin]         Social History     Tobacco Use    Smoking status: Never     Passive exposure: Never    Smokeless tobacco: Never    Tobacco comments:     Lives in smoke free household   Substance Use Topics    Alcohol use: No       History   Drug Use No             Review of Systems  CONSTITUTIONAL: NEGATIVE for fever, chills, change in weight  INTEGUMENTARY/SKIN: NEGATIVE for worrisome rashes, moles or lesions  ENT/MOUTH: NEGATIVE for ear, mouth  "and throat problems  RESP: NEGATIVE for significant cough or SOB  CV: NEGATIVE for chest pain, palpitations or peripheral edema  GI: NEGATIVE for nausea, abdominal pain, heartburn, or change in bowel habits  : NEGATIVE for frequency, dysuria, or hematuria  NEURO: NEGATIVE for weakness, dizziness or paresthesias  HEME: NEGATIVE for bleeding problems    Objective    /90   Pulse 57   Temp 98.3  F (36.8  C) (Temporal)   Resp 12   Ht 1.607 m (5' 3.25\")   Wt 101.2 kg (223 lb)   SpO2 100%   BMI 39.19 kg/m     Estimated body mass index is 39.19 kg/m  as calculated from the following:    Height as of this encounter: 1.607 m (5' 3.25\").    Weight as of this encounter: 101.2 kg (223 lb).  Physical Exam  GENERAL: alert and no distress  EYES: Eyes grossly normal to inspection, PERRL and conjunctivae and sclerae normal  HENT: ear canals and TM's normal, nose and mouth without ulcers or lesions  NECK: no adenopathy, no asymmetry, masses, or scars  RESP: lungs clear to auscultation - no rales, rhonchi or wheezes  CV: regular rate and rhythm, normal S1 S2, no S3 or S4, no murmur, click or rub, no peripheral edema  ABDOMEN: soft, nontender, no hepatosplenomegaly, no masses   MS: no gross musculoskeletal defects noted, limping gait, favoring the right leg. Using cane.   SKIN: no suspicious lesions or rashes  NEURO: Normal strength and tone, mentation intact and speech normal  PSYCH: mentation appears normal, affect normal/bright    Recent Labs   Lab Test 12/05/24  0828   HGB 13.3         POTASSIUM 5.3   CR 0.66        Diagnostics  Labs pending at this time.  Results will be reviewed when available.   No EKG required, no history of coronary heart disease, significant arrhythmia, peripheral arterial disease or other structural heart disease.    Revised Cardiac Risk Index (RCRI)  The patient has the following serious cardiovascular risks for perioperative complications:   - No serious cardiac risks = 0 points "     RCRI Interpretation: 0 points: Class I (very low risk - 0.4% complication rate)         Signed Electronically by: Alisa Randall PA-C  A copy of this evaluation report is provided to the requesting physician.

## 2025-01-30 NOTE — RESULT ENCOUNTER NOTE
Rozina,   Your urine was normal.   Your potassium level came back slightly high. This is likely just an incidental finding. We should recheck this next week with a lab only visit prior to your surgery.   Alisa Randall PA-C

## 2025-02-03 ENCOUNTER — LAB (OUTPATIENT)
Dept: LAB | Facility: CLINIC | Age: 75
End: 2025-02-03
Payer: MEDICARE

## 2025-02-03 DIAGNOSIS — E87.5 HYPERKALEMIA: ICD-10-CM

## 2025-02-03 LAB — POTASSIUM SERPL-SCNC: 4.9 MMOL/L (ref 3.4–5.3)

## 2025-02-03 PROCEDURE — 84132 ASSAY OF SERUM POTASSIUM: CPT

## 2025-02-03 PROCEDURE — 36415 COLL VENOUS BLD VENIPUNCTURE: CPT

## 2025-02-24 ENCOUNTER — NURSE TRIAGE (OUTPATIENT)
Dept: FAMILY MEDICINE | Facility: CLINIC | Age: 75
End: 2025-02-24
Payer: MEDICARE

## 2025-02-24 NOTE — TELEPHONE ENCOUNTER
Patient needs to be seen in person rather than virtual. Needs to move from virtual only spot. Ok to take triage spot tomorrow.   Alisa Randall PA-C

## 2025-02-24 NOTE — TELEPHONE ENCOUNTER
Nurse Triage SBAR    Is this a 2nd Level Triage? NO    Situation: low BP and leg swelling    Background: recent hip replacement.     Assessment: States she had taken her BP this morning and got a reading in the 90s/50s with a pulse of 63. States she was also a bit lightheaded as well but this has resolved. States just prior to calling she measured her BP at 129/74 with a pulse of 62. During the call she checked her BP and got 127/83. Denies headache, dizziness or lightheadedness or weakness during the call.     She also mentions that her legs are swollen from the mid calf down. States she discussed this with her surgeon who told her it was normal and should resolve on its own. Denies any redness, tenderness, SOB or warmth to the swelling. States she does also have compression stockings but her legs are so swollen that they hurt when she wears them. States there is a bit of redness and numbness in her right knee.    Protocol Recommended Disposition:   See in Office Within 3 Days    Recommendation: Per chart review, she has a video visit scheduled for 2/25 for a hospital follow up. She would like to know if this is appropriate to discuss this. Writer advised that she should be good to discuss this at that visit due to her lack of concerning symptoms but a message would be sent to Dr Randall to confirm. Advised when to call back and what symptoms to monitor for in the mean time. She verbalized understanding and agrees with this plan.    Routed to provider    Does the patient meet one of the following criteria for ADS visit consideration? 16+ years old, with an [facility] PCP     TIP  Providers, please consider if this condition is appropriate for management at one of our Acute and Diagnostic Services sites.     If patient is a good candidate, please use dotphrase <dot>triageresponse and select Refer to ADS to document.        Reason for Disposition   Brief weakness or lightheadedness after standing up or  "eating    Additional Information   Negative: Fall in systolic BP > 20 mmHg after eating a meal   Negative: Diastolic BP < 50 mmHg   Negative: Systolic BP  while taking blood pressure medications   Negative: Fall in systolic BP > 20 mm Hg from normal and NOT feeling weak or lightheaded   Negative: Fall in systolic BP > 20 mmHg after standing up   Negative: Systolic BP < 90 and NOT feeling weak or lightheaded   Negative: Fall in systolic BP > 20 mm Hg from normal and feeling weak or lightheaded   Negative: Patient sounds very sick or weak to the triager   Negative: Systolic BP < 80 and NOT feeling weak or lightheaded   Negative: Abdominal pain   Negative: Major surgery in the past month   Negative: Fever > 100.4 F (38.0 C)   Negative: Drinking very little and dehydration suspected (e.g., no urine > 12 hours, very dry mouth, very lightheaded)   Negative: Systolic BP < 90 and feeling weak or lightheaded (e.g., woozy, feeling like they might faint)   Negative: Started suddenly after an allergic medicine, an allergic food, or bee sting   Negative: Shock suspected (e.g., cold/pale/clammy skin, too weak to stand, low BP, rapid pulse)   Negative: Difficult to awaken or acting confused (e.g., disoriented, slurred speech)   Negative: Fainted   Negative: Chest pain   Negative: Bleeding (e.g., vomiting blood, rectal bleeding or tarry stools, severe vaginal bleeding)   Negative: Extra heartbeats, irregular heart beating, or heart is beating very fast (i.e., 'palpitations')   Negative: Sounds like a life-threatening emergency to the triager    Answer Assessment - Initial Assessment Questions  1. BLOOD PRESSURE: \"What is your blood pressure?\" \"Did you take at least two measurements 5 minutes apart?\"      90s/50s this morning, 129/74 prior to calling, during call 127/83  2. ONSET: \"When did you take your blood pressure?\"      Prior to call  3. HOW: \"How did you take your blood pressure?\" (e.g., visiting nurse, automatic home " "BP monitor)      Home monitor   4. HISTORY: \"Do you have a history of low blood pressure?\" \"What is your blood pressure normally?\"      Had low BP before but not this low. Usually 128/70s at home  5. MEDICINES: \"Are you taking any medicines for blood pressure?\" If Yes, ask: \"Have they been changed recently?\"      None   6. PULSE RATE: \"Do you know what your pulse rate is?\"       63  7. OTHER SYMPTOMS: \"Have you been sick recently?\" \"Have you had a recent injury?\"      Recent surgery right hip replacement, swelling in lower legs (has compression but too tight), lightheaded this morning (now resolved), K+ high  8. PREGNANCY: \"Is there any chance you are pregnant?\" \"When was your last menstrual period?\"      N/A    Protocols used: Blood Pressure - Low-A-OH    "

## 2025-02-24 NOTE — TELEPHONE ENCOUNTER
RN called patient and relayed provider's message. Patient verbalized understanding. Scheduled her to see Alisa tomorrow at 11:10am. Cancelled the video visit appointment (pt aware).     Alissa HIDALGO RN  [facility]

## 2025-02-25 ENCOUNTER — OFFICE VISIT (OUTPATIENT)
Dept: PEDIATRICS | Facility: CLINIC | Age: 75
End: 2025-02-25
Payer: MEDICARE

## 2025-02-25 ENCOUNTER — OFFICE VISIT (OUTPATIENT)
Dept: FAMILY MEDICINE | Facility: CLINIC | Age: 75
End: 2025-02-25
Payer: MEDICARE

## 2025-02-25 ENCOUNTER — ANCILLARY PROCEDURE (OUTPATIENT)
Dept: ULTRASOUND IMAGING | Facility: CLINIC | Age: 75
End: 2025-02-25
Attending: NURSE PRACTITIONER
Payer: MEDICARE

## 2025-02-25 VITALS
HEART RATE: 68 BPM | OXYGEN SATURATION: 98 % | TEMPERATURE: 98.6 F | SYSTOLIC BLOOD PRESSURE: 150 MMHG | DIASTOLIC BLOOD PRESSURE: 78 MMHG | RESPIRATION RATE: 15 BRPM

## 2025-02-25 VITALS
DIASTOLIC BLOOD PRESSURE: 84 MMHG | BODY MASS INDEX: 40.93 KG/M2 | HEART RATE: 65 BPM | RESPIRATION RATE: 20 BRPM | OXYGEN SATURATION: 99 % | TEMPERATURE: 98.9 F | WEIGHT: 231 LBS | SYSTOLIC BLOOD PRESSURE: 141 MMHG | HEIGHT: 63 IN

## 2025-02-25 DIAGNOSIS — Z96.641 S/P TOTAL RIGHT HIP ARTHROPLASTY: ICD-10-CM

## 2025-02-25 DIAGNOSIS — M79.661 RIGHT CALF PAIN: ICD-10-CM

## 2025-02-25 DIAGNOSIS — M79.661 RIGHT CALF PAIN: Primary | ICD-10-CM

## 2025-02-25 DIAGNOSIS — R11.0 NAUSEA: Primary | ICD-10-CM

## 2025-02-25 PROCEDURE — 99207 REFERRAL TO ACUTE AND DIAGNOSTIC SERVICES: CPT | Performed by: PHYSICIAN ASSISTANT

## 2025-02-25 PROCEDURE — 3077F SYST BP >= 140 MM HG: CPT | Performed by: NURSE PRACTITIONER

## 2025-02-25 PROCEDURE — 3079F DIAST BP 80-89 MM HG: CPT | Performed by: PHYSICIAN ASSISTANT

## 2025-02-25 PROCEDURE — 3077F SYST BP >= 140 MM HG: CPT | Performed by: PHYSICIAN ASSISTANT

## 2025-02-25 PROCEDURE — 93971 EXTREMITY STUDY: CPT | Mod: RT | Performed by: RADIOLOGY

## 2025-02-25 PROCEDURE — 3078F DIAST BP <80 MM HG: CPT | Performed by: NURSE PRACTITIONER

## 2025-02-25 PROCEDURE — 99213 OFFICE O/P EST LOW 20 MIN: CPT | Performed by: NURSE PRACTITIONER

## 2025-02-25 PROCEDURE — 1125F AMNT PAIN NOTED PAIN PRSNT: CPT | Performed by: PHYSICIAN ASSISTANT

## 2025-02-25 RX ORDER — ONDANSETRON 4 MG/1
4 TABLET, ORALLY DISINTEGRATING ORAL EVERY 8 HOURS PRN
Qty: 20 TABLET | Refills: 0 | Status: SHIPPED | OUTPATIENT
Start: 2025-02-25

## 2025-02-25 ASSESSMENT — PAIN SCALES - GENERAL: PAINLEVEL_OUTOF10: MODERATE PAIN (5)

## 2025-02-25 NOTE — PROGRESS NOTES
"  Assessment & Plan     1. Nausea    2. Right calf pain    3. S/P total right hip arthroplasty      Patient 1 week post surgery with swelling/weight gain and right calf pain only on ASA for prevention.   She has not been mobile since surgery. Having nausea, decrease oral intake.   ADS to accept patient for ultrasound to rule out DVT.   Will have patient start zofran and increase fluids over the next 24 hours. She will call clinic and update on status tomorrow.   If worsening symptoms she will go to ED.     The longitudinal plan of care for the diagnosis(es)/condition(s) as documented were addressed during this visit. Due to the added complexity in care, I will continue to support Rozina in the subsequent management and with ongoing continuity of care.      BMI  Estimated body mass index is 40.6 kg/m  as calculated from the following:    Height as of this encounter: 1.607 m (5' 3.25\").    Weight as of this encounter: 104.8 kg (231 lb).           Referral to Acute and Diagnostic Services    [phone] ([place] [address]    Transition to Acute & Diagnostic Services Clinic has been discussed with patient, and she agrees with next level of care.   Patient understands that evaluation/treatment at [facility] typically takes significantly longer than in clinic/urgent care (>2 hours).  The [facility] Acute and Diagnostics Services Clinic has been contacted by provider/staff to confirm patient acceptance.         Special issues:      None                     The following provider has assessed this patient for intervention at [facility], and directed the patient for referral: Alisa Randall PA-C              Kit Handy is a 74 year old, presenting for the following health issues:  Follow Up      2/25/2025    11:14 AM   Additional Questions   Roomed by Puneet     History of Present Illness       Reason for visit:  Blood pressure cholesterol medication and swollen " "legs  Symptom onset:  3-7 days ago  Symptoms include:  Fluid on lakes low blood pressure at times  Symptom intensity:  Moderate  Symptom progression:  Staying the same  Had these symptoms before:  No  What makes it worse:  I don t know  What makes it better:  I don t know ice pack, perhaps on the legs maybe   She is taking medications regularly.   Surgery total right hip on 2/18/25  Yesterday noted a low blood pressure that since resolved.   Has been having some nausea, no vomiting. Not drinking much due to nausea.   Has been feeling like she is going to throw up. Her mouth is dry.   Weight is up 8# since pre-op. No chest pain, no shortness of breath.       She did not take any of her normal medications this am except for ASA and Mobic.   She has been urinating- it is dark.   Has been feeling cold, but no fevers.  Feeling tired. 300ml blood loss during surgery.      Patient notes that her whole body feels swollen. However right leg is painful.   Would like help removing the right hip dressing as she can't reach it, skin irritated by dressing.                    Objective    BP (!) 141/84 (BP Location: Left arm, Patient Position: Sitting, Cuff Size: Adult Large)   Pulse 65   Temp 98.9  F (37.2  C) (Temporal)   Resp 20   Ht 1.607 m (5' 3.25\")   Wt 104.8 kg (231 lb)   SpO2 99%   BMI 40.60 kg/m    Body mass index is 40.6 kg/m .  Physical Exam   GENERAL: alert and no distress  HENT: ear canals and TM's normal, nose and mouth without ulcers or lesions, minimally dry mouth but moist mucus membranes.   NECK: no adenopathy, no asymmetry, masses, or scars  RESP: lungs clear to auscultation - no rales, rhonchi or wheezes  CV: regular rate and rhythm, normal S1 S2, no S3 or S4, no murmur, click or rub, no peripheral edema   MS: no gross musculoskeletal defects noted, Non pitting edema noted on bilateral legs with R> L. Pain with palpation of the right calf.   SKIN: right hip incision clean and dry with some mild local " skin irritation from tegaderm.             Signed Electronically by: Alisa Randall PA-C

## 2025-02-25 NOTE — PROGRESS NOTES
"Acute and Diagnostic Services Clinic Visit    Assessment & Plan   Problem List Items Addressed This Visit    None  Visit Diagnoses       Right calf pain    -  Primary    Relevant Orders    US Lower Extremity Venous Duplex Right (Completed)             {Time spent (Optional):800607}     BMI  Estimated body mass index is 40.6 kg/m  as calculated from the following:    Height as of an earlier encounter on 2/25/25: 1.607 m (5' 3.25\").    Weight as of an earlier encounter on 2/25/25: 104.8 kg (231 lb).   {Weight Management Plan needed for ACO:956598}    {FOLLOW UP PLANS (Optional) Includes COVID19 Treatment Plan:881438}    No follow-ups on file.      Kit Handy is a 74 year old, presenting for the following health issues:  Calf pain  {(!) Visit Details have not yet been documented.  Please enter Visit Details and then use this list to pull in documentation. (Optional):343683}  HPI     Evaluation for possible DVT  Onset/Duration: A few days  Description:       Location: Both legs are swelling but there is pain in the right calf       Redness: {.:533504}Can recall       Pain: mild in right calf       Warmth: YES       Joint swelling YES  Progression of symptoms same  Accompanying signs and symptoms:       Fevers: YES- slight       Numbness/tingling/weakness: YES- weak       Chest pain/pleurisy: no        Shortness of breath: no   History        Trauma: YES- right hip surgery on 2/18/25        Recent travel/when: no         Previous history of DVT: no         Family history of DVT: no         Recent surgery: YES  Aggravating factors include: overuse  Therapies tried and outcome: rest/inactivity and ice  Prior surgery on arteries of veins in this area: No  {Link to age adjusted D-dimer (UTD)   :846849}  Stopped taking pain medication due to nausea      Review of Systems  Constitutional, HEENT, cardiovascular, pulmonary, GI, , musculoskeletal, neuro, skin, endocrine and psych systems are negative, except as " otherwise noted.      Objective    BP (!) 150/78 (BP Location: Left arm, Patient Position: Sitting, Cuff Size: Adult Small)   Pulse 68   Temp 98.6  F (37  C) (Oral)   Resp 15   SpO2 98%   There is no height or weight on file to calculate BMI.  Physical Exam   GENERAL: alert and no distress  EYES: Eyes grossly normal to inspection, PERRL and conjunctivae and sclerae normal  RESP: {:909819}  MS: {:340000}  PSYCH: mentation appears normal, affect normal/bright    Results for orders placed or performed in visit on 02/25/25   US Lower Extremity Venous Duplex Right     Status: None    Narrative    EXAM: US LOWER EXTREMITY VENOUS DUPLEX RIGHT  LOCATION: [facility]  DATE: 2/25/2025    INDICATION:  Right calf pain. Right hip arthroplasty 2/18/2025  COMPARISON: Venous Ultrasound 1/13/2023  TECHNIQUE: Venous Duplex ultrasound of the right lower extremity with and without compression, augmentation and duplex. Color flow and spectral Doppler with waveform analysis performed.    FINDINGS: Exam includes the common femoral, femoral, popliteal, and contralateral common femoral veins as well as segmentally visualized deep calf veins and greater saphenous vein.     RIGHT: No deep vein thrombosis. No superficial thrombophlebitis. There is a 2.7 x 1.6 x 0.9 cm popliteal fossa cyst.    Superficial to this,  edema noted amidst the lobules of the subcutaneous fat behind the knee      Impression    IMPRESSION:  1.  No deep venous thrombosis in the right leg.  2.  There is a small popliteal fossa cyst.  3.  Edema noted amidst the subcutaneous fat lobules behind the knee. Given the recent surgery upon the right hip, suspect this reflects dependent accumulation of postoperative fluid.           Signed Electronically by: Lucina Longo NP  {Email feedback regarding this note to [email or web address]   :[id number]}

## 2025-02-26 ENCOUNTER — TELEPHONE (OUTPATIENT)
Dept: FAMILY MEDICINE | Facility: CLINIC | Age: 75
End: 2025-02-26
Payer: MEDICARE

## 2025-02-26 NOTE — TELEPHONE ENCOUNTER
Spoke with patient. Relayed provider's message as written. Patient verbalized understanding and has no further questions at this time.    Assisted with booking appointment on 2/28 at 9:30 am as requested with PCP.    LINUS Rushing RN  [facility]

## 2025-02-26 NOTE — TELEPHONE ENCOUNTER
"Patient calling to update JODIE Schneider as requested.  Was seen yesterday by PCP for nausea, right calf pain, swelling, then sent to [facility] for DVT rule out.  US was negative for DVT.    Per patient, legs are still swollen  She continues to have nausea without vomiting and no appetite.  Zofran helping somewhat, \"not real bad but enough to make me not want to eat.\"  Since OV with provider yesterday, she has had 2 cups of water, a glass of milk, and 1 pancake.  Continues to feel weak.  Symptoms have remained the same, no worsening nor improvement    Ok to leave detailed message on patient's VM    Jose M Dickerson RN  [facility]      "

## 2025-03-04 ENCOUNTER — TELEPHONE (OUTPATIENT)
Dept: FAMILY MEDICINE | Facility: CLINIC | Age: 75
End: 2025-03-04
Payer: MEDICARE

## 2025-03-04 NOTE — TELEPHONE ENCOUNTER
Forms/Letter Request    Type of form/letter: Home Health Certification      Do we have the form/letter: Yes:     Who is the form from? Home care    Where did/will the form come from? form was faxed in    When is form/letter needed by: 3-5 Days     How would you like the form/letter returned: Fax: [phone]    Patient Notified form requests are processed in 5-7 business days:Yes    Could we send this information to you in [employer] or would you prefer to receive a phone call?:   Patient would prefer a phone call   Okay to leave a detailed message?: Yes at Home number on file [phone] (home)

## 2025-03-17 ENCOUNTER — MEDICAL CORRESPONDENCE (OUTPATIENT)
Dept: HEALTH INFORMATION MANAGEMENT | Facility: CLINIC | Age: 75
End: 2025-03-17
Payer: MEDICARE

## 2025-04-30 ENCOUNTER — TRANSFERRED RECORDS (OUTPATIENT)
Dept: MULTI SPECIALTY CLINIC | Facility: CLINIC | Age: 75
End: 2025-04-30
Payer: MEDICARE

## 2025-04-30 LAB — RETINOPATHY: NORMAL

## 2025-05-06 ENCOUNTER — OFFICE VISIT (OUTPATIENT)
Dept: FAMILY MEDICINE | Facility: CLINIC | Age: 75
End: 2025-05-06
Payer: MEDICARE

## 2025-05-06 VITALS
SYSTOLIC BLOOD PRESSURE: 142 MMHG | BODY MASS INDEX: 39.09 KG/M2 | TEMPERATURE: 97.7 F | HEIGHT: 63 IN | HEART RATE: 62 BPM | RESPIRATION RATE: 16 BRPM | WEIGHT: 220.6 LBS | OXYGEN SATURATION: 98 % | DIASTOLIC BLOOD PRESSURE: 82 MMHG

## 2025-05-06 DIAGNOSIS — E03.9 HYPOTHYROIDISM, UNSPECIFIED TYPE: ICD-10-CM

## 2025-05-06 DIAGNOSIS — R35.0 URINARY FREQUENCY: ICD-10-CM

## 2025-05-06 DIAGNOSIS — E66.01 CLASS 2 SEVERE OBESITY WITH BODY MASS INDEX (BMI) OF 35 TO 39.9 WITH SERIOUS COMORBIDITY (H): ICD-10-CM

## 2025-05-06 DIAGNOSIS — M17.0 PRIMARY OSTEOARTHRITIS OF BOTH KNEES: ICD-10-CM

## 2025-05-06 DIAGNOSIS — E66.01 MORBID OBESITY (H): ICD-10-CM

## 2025-05-06 DIAGNOSIS — Z01.818 PREOP GENERAL PHYSICAL EXAM: Primary | ICD-10-CM

## 2025-05-06 DIAGNOSIS — M79.7 FIBROMYALGIA: ICD-10-CM

## 2025-05-06 DIAGNOSIS — I10 HYPERTENSION GOAL BP (BLOOD PRESSURE) < 140/90: ICD-10-CM

## 2025-05-06 DIAGNOSIS — E66.812 CLASS 2 SEVERE OBESITY WITH BODY MASS INDEX (BMI) OF 35 TO 39.9 WITH SERIOUS COMORBIDITY (H): ICD-10-CM

## 2025-05-06 LAB
ALBUMIN UR-MCNC: NEGATIVE MG/DL
ANION GAP SERPL CALCULATED.3IONS-SCNC: 8 MMOL/L (ref 7–15)
APPEARANCE UR: CLEAR
BILIRUB UR QL STRIP: NEGATIVE
BUN SERPL-MCNC: 21.6 MG/DL (ref 8–23)
CALCIUM SERPL-MCNC: 9.2 MG/DL (ref 8.8–10.4)
CHLORIDE SERPL-SCNC: 101 MMOL/L (ref 98–107)
COLOR UR AUTO: YELLOW
CREAT SERPL-MCNC: 0.67 MG/DL (ref 0.51–0.95)
EGFRCR SERPLBLD CKD-EPI 2021: >90 ML/MIN/1.73M2
ERYTHROCYTE [DISTWIDTH] IN BLOOD BY AUTOMATED COUNT: 13.1 % (ref 10–15)
GLUCOSE SERPL-MCNC: 96 MG/DL (ref 70–99)
GLUCOSE UR STRIP-MCNC: NEGATIVE MG/DL
HCO3 SERPL-SCNC: 27 MMOL/L (ref 22–29)
HCT VFR BLD AUTO: 37.8 % (ref 35–47)
HGB BLD-MCNC: 11.9 G/DL (ref 11.7–15.7)
HGB UR QL STRIP: NEGATIVE
KETONES UR STRIP-MCNC: NEGATIVE MG/DL
LEUKOCYTE ESTERASE UR QL STRIP: NEGATIVE
MCH RBC QN AUTO: 29.8 PG (ref 26.5–33)
MCHC RBC AUTO-ENTMCNC: 31.5 G/DL (ref 31.5–36.5)
MCV RBC AUTO: 95 FL (ref 78–100)
NITRATE UR QL: NEGATIVE
PH UR STRIP: 6.5 [PH] (ref 5–7)
PLATELET # BLD AUTO: 239 10E3/UL (ref 150–450)
POTASSIUM SERPL-SCNC: 4.6 MMOL/L (ref 3.4–5.3)
RBC # BLD AUTO: 4 10E6/UL (ref 3.8–5.2)
SODIUM SERPL-SCNC: 136 MMOL/L (ref 135–145)
SP GR UR STRIP: 1.01 (ref 1–1.03)
UROBILINOGEN UR STRIP-ACNC: 0.2 E.U./DL
WBC # BLD AUTO: 4.9 10E3/UL (ref 4–11)

## 2025-05-06 PROCEDURE — G2211 COMPLEX E/M VISIT ADD ON: HCPCS | Performed by: PHYSICIAN ASSISTANT

## 2025-05-06 PROCEDURE — 3077F SYST BP >= 140 MM HG: CPT | Performed by: PHYSICIAN ASSISTANT

## 2025-05-06 PROCEDURE — 36415 COLL VENOUS BLD VENIPUNCTURE: CPT | Performed by: PHYSICIAN ASSISTANT

## 2025-05-06 PROCEDURE — 81003 URINALYSIS AUTO W/O SCOPE: CPT | Performed by: PHYSICIAN ASSISTANT

## 2025-05-06 PROCEDURE — 99214 OFFICE O/P EST MOD 30 MIN: CPT | Performed by: PHYSICIAN ASSISTANT

## 2025-05-06 PROCEDURE — 85027 COMPLETE CBC AUTOMATED: CPT | Performed by: PHYSICIAN ASSISTANT

## 2025-05-06 PROCEDURE — 3079F DIAST BP 80-89 MM HG: CPT | Performed by: PHYSICIAN ASSISTANT

## 2025-05-06 PROCEDURE — 80048 BASIC METABOLIC PNL TOTAL CA: CPT | Performed by: PHYSICIAN ASSISTANT

## 2025-05-06 RX ORDER — HYDROXYZINE HYDROCHLORIDE 25 MG/1
25 TABLET, FILM COATED ORAL AT BEDTIME
Qty: 60 TABLET | Refills: 1 | Status: CANCELLED | OUTPATIENT
Start: 2025-05-06

## 2025-05-06 RX ORDER — EPINEPHRINE 0.3 MG/.3ML
INJECTION SUBCUTANEOUS
Qty: 2 EACH | Refills: 1 | Status: CANCELLED | OUTPATIENT
Start: 2025-05-06

## 2025-05-06 NOTE — PATIENT INSTRUCTIONS
How to Take Your Medication Before Surgery  Preoperative Medication Instructions   Antiplatelet or Anticoagulation Medication Instructions   - We reviewed the medication list and the patient is not on an antiplatelet or anticoagulation medications.    Additional Medication Instructions  Take all scheduled medications on the day of surgery EXCEPT for modifications listed below:   - Herbal medications and vitamins: DO NOT TAKE 14 days prior to surgery.     You should stop using any Ibuprofen (Advil), Naproxen (Aleve) or Aspirin containing products 7 days before your procedure. You may use Tylenol (Acetaminophen) as needed.  (Stop meloxicam)    Patient Education   Preparing for Your Surgery  For Adults  Getting started  In most cases, a nurse will call to review your health history and instructions. They will give you an arrival time based on your scheduled surgery time. Please be ready to share:  Your doctor's clinic name and phone number  Your medical, surgical, and anesthesia history  A list of allergies and sensitivities  A list of medicines, including herbal treatments and over-the-counter drugs  Whether the patient has a legal guardian (ask how to send us the papers in advance)  Note: You may not receive a call if you were seen at our PAC (Preoperative Assessment Center).  Please tell us if you're pregnant--or if there's any chance you might be pregnant. Some surgeries may injure a fetus (unborn baby), so they require a pregnancy test. Surgeries that are safe for a fetus don't always need a test, and you can choose whether to have one.   Preparing for surgery  Within 10 to 30 days of surgery: Have a pre-op exam (sometimes called an H&P, or History and Physical). This can be done at a clinic or pre-operative center.  If you're having a , you may not need this exam. Talk to your care team.  At your pre-op exam, talk to your care team about all medicines you take. (This includes CBD oil and any drugs, such  as THC, marijuana, and other forms of cannabis.) If you need to stop any medicine before surgery, ask when to start taking it again.  This is for your safety. Many medicines and drugs can make you bleed too much during surgery. Some change how well surgery (anesthesia) drugs work.  Call your insurance company to let them know you're having surgery. (If you don't have insurance, call [phone].)  Call your clinic if there's any change in your health. This includes a scrape or scratch near the surgery site, or any signs of a cold (sore throat, runny nose, cough, rash, fever).  Eating and drinking guidelines  For your safety: Unless your surgeon tells you otherwise, follow the guidelines below.  Eat and drink as normal until 8 hours before you arrive for surgery. After that, no food or milk. You can spit out gum when you arrive.  Drink clear liquids until 2 hours before you arrive. These are liquids you can see through, like water, Gatorade, and Propel Water. They also include plain black coffee and tea (no cream or milk).  No alcohol for 24 hours before you arrive. The night before surgery, stop any drinks that contain THC.  If your care team tells you to take medicine on the morning of surgery, it's okay to take it with a sip of water. No other medicines or drugs are allowed (including CBD oil)--follow your care team's instructions.  If you have questions the day of surgery, call your hospital or surgery center.   Preventing infection  Shower or bathe the night before and the morning of surgery. Follow the instructions your clinic gave you. (If no instructions, use regular soap.)  Don't shave or clip hair near your surgery site. We'll remove the hair if needed.  Don't smoke or vape the morning of surgery. No chewing tobacco for 6 hours before you arrive. A nicotine patch is okay. You may spit out nicotine gum when you arrive.  For some surgeries, the surgeon will tell you to fully quit smoking and nicotine.  We  will make every effort to keep you safe from infection. We will:  Clean our hands often with soap and water (or an alcohol-based hand rub).  Clean the skin at your surgery site with a special soap that kills germs.  Give you a special gown to keep you warm. (Cold raises the risk of infection.)  Wear hair covers, masks, gowns, and gloves during surgery.  Give antibiotic medicine, if prescribed. Not all surgeries need this medicine.  What to bring on the day of surgery  Photo ID and insurance card  Copy of your health care directive, if you have one  Glasses and hearing aids (bring cases)  You can't wear contacts during surgery  Inhaler and eye drops, if you use them (tell us about these when you arrive)  CPAP machine or breathing device, if you use them  A few personal items, if spending the night  If you have . . .  A pacemaker, ICD (cardiac defibrillator), or other implant: Bring the ID card.  An implanted stimulator: Bring the remote control.  A legal guardian: Bring a copy of the certified (court-stamped) guardianship papers.  Please remove any jewelry, including body piercings. Leave jewelry and other valuables at home.  If you're going home the day of surgery  You must have a responsible adult drive you home. They should stay with you overnight as well.  If you don't have someone to stay with you, and you aren't safe to go home alone, we may keep you overnight. Insurance often won't pay for this.  After surgery  If it's hard to control your pain or you need more pain medicine, please call your surgeon's office.  Questions?   If you have any questions for your care team, list them here:   ____________________________________________________________________________________________________________________________________________________________________________________________________________________________________________________________  For informational purposes only. Not to replace the advice of your health  care provider. Copyright   2003, 2019 [facility]. All rights reserved. Clinically reviewed by Alverto Castellon MD. [employer] [id number] - REV 08/24.

## 2025-05-06 NOTE — PROGRESS NOTES
Preoperative Evaluation  [facility]  [address] [place] [address]  [place] [address]  Phone: [phone]  Primary Provider: Alisa Randall PA-C  Pre-op Performing Provider: Alisa Randall PA-C  May 6, 2025             5/4/2025   Surgical Information   What procedure is being done? Right knee replacement   Facility or Hospital where procedure/surgery will be performed: [facility]   Who is doing the procedure / surgery? Dr. Cash   Date of surgery / procedure: june 4 2025   Time of surgery / procedure: do not know yet   Where do you plan to recover after surgery? Other -      Fax number for surgical facility: [phone]    Assessment & Plan     The proposed surgical procedure is considered INTERMEDIATE risk.    Preop general physical exam  - Basic metabolic panel; Future  - CBC with platelets; Future  - Basic metabolic panel  - CBC with platelets    Primary osteoarthritis of both knees  Class 2 severe obesity with body mass index (BMI) of 35 to 39.9 with serious comorbidity (H)  Urinary frequency  - UA Macroscopic with reflex to Microscopic and Culture; Future  - UA Macroscopic with reflex to Microscopic and Culture    Hypertension goal BP (blood pressure) < 140/90  High, likely secondary to pain, normal at home. Continue to monitor.   Hypothyroidism, unspecified type  Fibromyalgia  Morbid obesity (H)         Risks and Recommendations  The patient has the following additional risks and recommendations for perioperative complications:   - No identified additional risk factors other than previously addressed    Preoperative Medication Instructions  Antiplatelet or Anticoagulation Medication Instructions   - We reviewed the medication list and the patient is not on an antiplatelet or anticoagulation medications.    Additional Medication Instructions  Take all scheduled medications on the day of surgery EXCEPT for modifications listed below:   - Herbal medications and vitamins: DO NOT TAKE 14 days prior  to surgery.    Recommendation  Approval given to proceed with proposed procedure pending review of diagnostic evaluation.      The longitudinal plan of care for the diagnosis(es)/condition(s) as documented were addressed during this visit. Due to the added complexity in care, I will continue to support R[place] in the subsequent management and with ongoing continuity of care.      Kit Handy is a 74 year old, presenting for the following:  Pre-Op Exam          5/6/2025    10:12 AM   Additional Questions   Roomed by alexis         5/6/2025   Forms   Any forms needing to be completed Yes     Via the Health Maintenance questionnaire, the patient has reported the following services have been completed -Eye Exam: [facility] 2025-04-30, this information has been sent to the abstraction team.  HPI: Patient with right OA of the knee requiring replacement          5/4/2025   Pre-Op Questionnaire   Have you ever had a heart attack or stroke? No   Have you ever had surgery on your heart or blood vessels, such as a stent placement, a coronary artery bypass, or surgery on an artery in your head, neck, heart, or legs? No   Do you have chest pain with activity? No   Do you have a history of heart failure? No   Do you currently have a cold, bronchitis or symptoms of other infection? No   Do you have a cough, shortness of breath, or wheezing? No   Do you or anyone in your family have previous history of blood clots? (!) UNKNOWN    Do you or does anyone in your family have a serious bleeding problem such as prolonged bleeding following surgeries or cuts? (!) UNKNOWN    Have you ever had problems with anemia or been told to take iron pills? No   Have you had any abnormal blood loss such as black, tarry or bloody stools, or abnormal vaginal bleeding? No   Have you ever had a blood transfusion? No   Are you willing to have a blood transfusion if it is medically needed before, during, or after your surgery? Yes    Have you or any of your relatives ever had problems with anesthesia? (!) YES    Do you have sleep apnea, excessive snoring or daytime drowsiness? (!) UNKNOWN   Do you have any artifical heart valves or other implanted medical devices like a pacemaker, defibrillator, or continuous glucose monitor? No   Do you have artificial joints? (!) YES   Are you allergic to latex? (!) YES     Advance Care Planning    Discussed advance care planning with patient; however, patient declined at this time.    Preoperative Review of    reviewed - no record of controlled substances prescribed.          Patient Active Problem List    Diagnosis Date Noted    Primary osteoarthritis of both knees 02/13/2023     Priority: Medium     TCO      Primary osteoarthritis of both ankles 11/07/2018     Priority: Medium    Hypomagnesemia 11/07/2018     Priority: Medium    Primary osteoarthritis of both first carpometacarpal joints 02/08/2016     Priority: Medium    Lumbar spinal stenosis 04/22/2014     Priority: Medium    Fibromyalgia 07/29/2011     Priority: Medium     Tramadol doesn't work.  Has been on propoxyphene, vicoden, mobic, celebrex, tylenol,   Codeine is ineffective and makes her sick.      Hypertension goal BP (blood pressure) < 140/90 07/29/2011     Priority: Medium     Home monitor calibrated and accurate September 25, 2015        Hypothyroidism      Priority: Medium      Past Medical History:   Diagnosis Date    Allergies     Endometriosis     Fibromyalgia     Hypertension goal BP (blood pressure) < 140/90 07/29/2011    Home monitor calibrated and accurate September 25, 2015     Hypomagnesemia     Hypothyroidism     Hypovitaminosis D 08/18/2015    Lumbar spinal stenosis 04/22/2014    MMT (medial meniscus tear) 09/11/2013    Morbid obesity (H) 08/16/2017    Osteoarthritis     Rheum- Dr. Ferrer    Primary osteoarthritis of both ankles 11/07/2018    Primary osteoarthritis of both first carpometacarpal joints 02/08/2016    Primary  osteoarthritis of both knees 2/13/2023    Scoliosis     Skin cancer 08/08/2012     Past Surgical History:   Procedure Laterality Date    BREAST BIOPSY, RT/LT  08/01/2008    Breat Biopsy RT/LT    HC REMOVAL GALLBLADDER  age 31    KNEE ARTHROSCOPY W/ DEBRIDEMENT Bilateral     ZZC VAG HYST,RMV TUBE/OVARY  age 34    endometriosis     Current Outpatient Medications   Medication Sig Dispense Refill    Ascorbic Acid (VITAMIN C PO) Take 500 mg by mouth daily      CALCIUM + D 600-200 MG-UNIT OR TABS 1 TABLET TWICE  DAILY      Cholecalciferol (VITAMIN D) 1000 UNITS capsule Take 2,000 Units by mouth daily 180 capsule 4    EPINEPHrine (ANY BX GENERIC EQUIV) 0.3 MG/0.3ML injection 2-pack INJECT 0.3ML IN THE MUSCLE ONCE FOR 1 DOSE 2 each 1    GLUCOSAMINE CHONDROITIN OR TABS 1 TABLET DAILY      hydrOXYzine HCl (ATARAX) 25 MG tablet Take 1 tablet (25 mg) by mouth at bedtime 60 tablet 1    levothyroxine (SYNTHROID/LEVOTHROID) 50 MCG tablet TAKE 1 TABLET BY MOUTH EVERY DAY 90 tablet 3    magnesium oxide 400 MG CAPS Take 1 tablet by mouth 3 times daily 270 capsule 4    meloxicam (MOBIC) 15 MG tablet TAKE 1 TABLET(15 MG) BY MOUTH DAILY 90 tablet 4    MULTI-VITAMIN OR None Entered      rosuvastatin (CRESTOR) 5 MG tablet Take 1 tablet (5 mg) by mouth daily. 90 tablet 1    VITAMIN E OR 1 TABLET DAILY      ondansetron (ZOFRAN ODT) 4 MG ODT tab Take 1 tablet (4 mg) by mouth every 8 hours as needed for nausea. 20 tablet 0       Allergies   Allergen Reactions    Depo-Medrol [Methylprednisolone]      Specifically Methylprednisolone acetate. May substitute with methylprednisolone succinate or other IV/IM steroid medication.    Amitriptyline      jittery    Celebrex [Emery-2 Inhibitors (Sulfonamide)]     Latex     Lidocaine-Epinephrine (Pf) Other (See Comments)     Palpitations, flushing    Morphine     Pcn [Penicillins]     Sulfa Antibiotics     Ultram [Tramadol Hcl]     Yellow Dye      Can't remember, but ends up in ER with reaction    Zithromax  "[Azithromycin]         Social History     Tobacco Use    Smoking status: Never     Passive exposure: Never    Smokeless tobacco: Never    Tobacco comments:     Lives in smoke free household   Substance Use Topics    Alcohol use: No       History   Drug Use No             Review of Systems  CONSTITUTIONAL: NEGATIVE for fever, chills, change in weight  INTEGUMENTARY/SKIN: NEGATIVE for worrisome rashes, moles or lesions  ENT/MOUTH: NEGATIVE for ear, mouth and throat problems  RESP: NEGATIVE for significant cough or SOB  CV: NEGATIVE for chest pain, palpitations or peripheral edema  GI: NEGATIVE for nausea, abdominal pain, heartburn, or change in bowel habits   female: frequency  MUSCULOSKELETAL: NEGATIVE for significant arthralgias or myalgia  NEURO: NEGATIVE for weakness, dizziness or paresthesias  HEME: NEGATIVE for bleeding problems    Objective    BP (!) 154/82   Pulse 62   Temp 97.7  F (36.5  C) (Temporal)   Resp 16   Ht 1.607 m (5' 3.27\")   Wt 100.1 kg (220 lb 9.6 oz)   SpO2 98%   BMI 38.75 kg/m     Estimated body mass index is 38.75 kg/m  as calculated from the following:    Height as of this encounter: 1.607 m (5' 3.27\").    Weight as of this encounter: 100.1 kg (220 lb 9.6 oz).  Physical Exam  GENERAL: alert and no distress  EYES: Eyes grossly normal to inspection, PERRL and conjunctivae and sclerae normal  HENT: ear canals and TM's normal, nose and mouth without ulcers or lesions  NECK: no adenopathy, no asymmetry, masses, or scars  RESP: lungs clear to auscultation - no rales, rhonchi or wheezes  CV: regular rate and rhythm, normal S1 S2, no S3 or S4, no murmur, click or rub, no peripheral edema  ABDOMEN: soft, nontender,   MS: no gross musculoskeletal defects noted, no edema  SKIN: no suspicious lesions or rashes  NEURO: Normal strength and tone, mentation intact and speech normal  PSYCH: mentation appears normal, affect normal/bright    Recent Labs   Lab Test 02/28/25  0952 02/03/25  1152 " 01/30/25  0914 12/05/24  0828   HGB 10.7*  --  12.8 13.3     --  250 258   NA  --   --  139 138   POTASSIUM  --  4.9 5.5* 5.3   CR  --   --  0.73 0.66        Diagnostics  Labs pending at this time.  Results will be reviewed when available.   No EKG required, no history of coronary heart disease, significant arrhythmia, peripheral arterial disease or other structural heart disease.    Revised Cardiac Risk Index (RCRI)  The patient has the following serious cardiovascular risks for perioperative complications:   - No serious cardiac risks = 0 points     RCRI Interpretation: 0 points: Class I (very low risk - 0.4% complication rate)         Signed Electronically by: Alisa Randall PA-C  A copy of this evaluation report is provided to the requesting physician.

## 2025-06-02 DIAGNOSIS — E78.5 HYPERLIPIDEMIA LDL GOAL <100: ICD-10-CM

## 2025-06-02 RX ORDER — ROSUVASTATIN CALCIUM 5 MG/1
5 TABLET, COATED ORAL DAILY
Qty: 90 TABLET | Refills: 1 | Status: SHIPPED | OUTPATIENT
Start: 2025-06-02

## 2025-06-16 ENCOUNTER — TELEPHONE (OUTPATIENT)
Dept: FAMILY MEDICINE | Facility: CLINIC | Age: 75
End: 2025-06-16
Payer: MEDICARE

## 2025-06-16 RX ORDER — HYDROXYZINE PAMOATE 25 MG/1
CAPSULE ORAL
COMMUNITY
Start: 2025-06-05 | End: 2025-06-19

## 2025-06-16 RX ORDER — ACETAMINOPHEN 500 MG
500-1000 TABLET ORAL
COMMUNITY

## 2025-06-16 RX ORDER — OXYCODONE HYDROCHLORIDE 5 MG/1
5-10 TABLET ORAL EVERY 6 HOURS PRN
COMMUNITY
End: 2025-06-19

## 2025-06-16 RX ORDER — ACETAMINOPHEN 325 MG/1
TABLET ORAL
COMMUNITY
End: 2025-06-19

## 2025-06-16 RX ORDER — ASPIRIN 81 MG/1
81 TABLET, COATED ORAL
COMMUNITY
Start: 2025-06-04 | End: 2025-07-17

## 2025-06-16 RX ORDER — ONDANSETRON 4 MG/1
TABLET, ORALLY DISINTEGRATING ORAL
COMMUNITY
Start: 2025-06-05

## 2025-06-16 RX ORDER — HYDROCODONE BITARTRATE AND ACETAMINOPHEN 5; 325 MG/1; MG/1
1 TABLET ORAL EVERY 6 HOURS PRN
COMMUNITY
Start: 2024-07-24 | End: 2025-06-19

## 2025-06-16 NOTE — TELEPHONE ENCOUNTER
She is taking max dose of tylenol 3000 mg a day so should follow-up with her surgeon for uncontrolled pain.     Fine to follow-up with PCP for high blood pressure within 2 weeks.     Ana Qiu MD

## 2025-06-16 NOTE — TELEPHONE ENCOUNTER
"Alisa,     Patient called back and was notified of covering providers advice. Patient became very anxious.     Has follow up with surgeon at 245 pm and would really appreciate seeing you beforehand for her blood pressure and to have labs done.     Can patient be double booked in late morning? Patient is very anxious and worried about her blood pressures.. She stated \"I feel so tired and something is not right I don't know if its maybe my kidneys or maybe I have an infection\".       Patient is okay with phone call from team about double booking starting 7 am.       Thanks,  VINCE Cronin  [facility]     "

## 2025-06-16 NOTE — TELEPHONE ENCOUNTER
Hold for Alisa, she will be back tomorrow. If patient would like, you can have her seen Lalo our Same day provider. He is off on Tuesdays but may be able to see her on Wednesday if he has openings.

## 2025-06-16 NOTE — TELEPHONE ENCOUNTER
"Alisa/Francisco,       Patient has knee replacement surgery the other day and is struggling with pain mngt, headaches, and blood pressure.     Per patient was never told she has HTN and she is not on BP medications.   Her BP right before calling was 155/90 BP, pulse 65. Recheck at 3:13 pm with nurse on the line - 140/76 BP and pulse 67. Patient stated her BP keeps fluctuating from being in the 150s to 130s systolic. She is in pain and feels very fatigued. Pain is 5/10 but took tylenol around 1pm, pain was about the same even before taking the tylenol (so still 5/10). Received oxycodone, but stopped the oxycodone a couple days ago due to it makes her nauseated  and \"made me feel worse\". Does take aspirin in the morning for blood clots post surgery. Also using ice packs on knee. Headache 4/10, started last night sometime which prompted her to check her BP. \"I can feel pulse of the headache\". Sees surgeon tomorrow for postop.     Patient asking how much tylenol can she be taking as bottles says no more than 6 tabs in 24 hours and that would not be enough to manage her pain. She is asking if she BP is high due to pain or could she be having another problem? When should patient be seen in clinic by PCP, per surgery note \"f/u in 10-14 days in clinic with PA\", however she wants to know if Alisa wants her to come in sooner?    Thanks,  VINCE Cronin  [facility]               "

## 2025-06-17 NOTE — TELEPHONE ENCOUNTER
I am unable to add patient onto schedule today. However, she can be seen by a colleague.   I would encourage her to increase fluids. Her blood pressure is likely high due to her pain. If she did not try a 1/2 dose of the oxycodone I would encourage her to do that if the pain is still severe.   She should mention these concerns to her surgeon as they could be related to the surgery rather than her underlying conditions.   Alisa Randall PA-C

## 2025-06-17 NOTE — TELEPHONE ENCOUNTER
"Patient updated on the below.    \"BP came down to 146/70's.    Patient stopped taking oxycodone two days ago.    Last time when took oxycodone in February for hip surgeries, had AE of Nausea, hypotension, wheezing.    Patient will not take oxycodone.      OV scheduled later this week with PCP per patient.    Iesha Xiong RN, BSN  [facility]    "

## 2025-06-19 ENCOUNTER — OFFICE VISIT (OUTPATIENT)
Dept: FAMILY MEDICINE | Facility: CLINIC | Age: 75
End: 2025-06-19
Payer: MEDICARE

## 2025-06-19 VITALS
RESPIRATION RATE: 17 BRPM | WEIGHT: 223 LBS | BODY MASS INDEX: 39.17 KG/M2 | OXYGEN SATURATION: 97 % | TEMPERATURE: 98.2 F | DIASTOLIC BLOOD PRESSURE: 66 MMHG | HEART RATE: 70 BPM | SYSTOLIC BLOOD PRESSURE: 126 MMHG

## 2025-06-19 DIAGNOSIS — R03.0 ELEVATED BLOOD-PRESSURE READING, WITHOUT DIAGNOSIS OF HYPERTENSION: ICD-10-CM

## 2025-06-19 DIAGNOSIS — Z98.890 S/P RIGHT KNEE SURGERY: Primary | ICD-10-CM

## 2025-06-19 ASSESSMENT — PAIN SCALES - GENERAL: PAINLEVEL_OUTOF10: MODERATE PAIN (4)

## 2025-06-19 NOTE — PROGRESS NOTES
"  Assessment & Plan     S/P right knee surgery  Patient working with ortho, improving with planned physical therapy starting next week. Continue tylenol for pain control.   - Walker Order for DME - ONLY FOR DME    Elevated blood-pressure reading, without diagnosis of hypertension  Not controlled. Has not been on blood pressure medication in sometime, however has had multiple orthopedic surgeries where post operatively has had higher blood pressures. Unclear if this is related to pain and recovery etc. Patient would benefit from ambulatory blood pressure monitoring. I have placed this order. She will continue to check at home. Blood pressure normal today.   - 24 Hour Blood Pressure Monitor - Adult; Future      The longitudinal plan of care for the diagnosis(es)/condition(s) as documented were addressed during this visit. Due to the added complexity in care, I will continue to support Rozina in the subsequent management and with ongoing continuity of care.  MED REC REQUIRED  Post Medication Reconciliation Status: discharge medications reconciled, continue medications without change  BMI  Estimated body mass index is 39.17 kg/m  as calculated from the following:    Height as of 5/6/25: 1.607 m (5' 3.27\").    Weight as of this encounter: 101.2 kg (223 lb).             Kit Handy is a 75 year old, presenting for the following health issues:  Hypertension (Post op done 06/18/25)        6/19/2025     9:49 AM   Additional Questions   Roomed by Tamra   Accompanied by self         6/19/2025     9:49 AM   Patient Reported Additional Medications   Patient reports taking the following new medications none     HPI        Want to talk about BP- had some high blood pressures at home. Came with headaches.   Has been off the oxycodone for 4 days-Tylenol 1000mg every 8 hours not enough for pain control but does not feel good with the oxycodone.   Some swelling has gone down in the leg.   No physical therapy yet. Plans for " outpatient therapy in 2 weeks. Patient not driving, can get into a car with assistance.   Wearing compression socks.       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               Objective    /66 (BP Location: Left arm, Patient Position: Sitting, Cuff Size: Adult Large)   Pulse 70   Temp 98.2  F (36.8  C) (Oral)   Resp 17   Wt 101.2 kg (223 lb)   SpO2 97%   BMI 39.17 kg/m    Body mass index is 39.17 kg/m .  Physical Exam   GENERAL: alert and no distress  RESP: lungs clear to auscultation - no rales, rhonchi or wheezes  CV: regular rate and rhythm, normal S1 S2, no S3 or S4, no murmur,    MS: no gross musculoskeletal defects noted, 1+ pitting edema on the right leg, with well healing midline incision over the knee without signs of infection.             Signed Electronically by: Alisa Randall PA-C

## 2025-07-16 ENCOUNTER — TELEPHONE (OUTPATIENT)
Dept: FAMILY MEDICINE | Facility: CLINIC | Age: 75
End: 2025-07-16
Payer: MEDICARE

## 2025-07-16 DIAGNOSIS — Z96.651 HISTORY OF RIGHT KNEE JOINT REPLACEMENT: Primary | ICD-10-CM

## 2025-07-16 RX ORDER — CLINDAMYCIN HYDROCHLORIDE 300 MG/1
600 CAPSULE ORAL ONCE
Qty: 2 CAPSULE | Refills: 2 | Status: SHIPPED | OUTPATIENT
Start: 2025-07-16 | End: 2025-07-16

## 2025-07-16 NOTE — TELEPHONE ENCOUNTER
Routing [employer] message to provider.    Patient calling to ask PCP to prescribe an antibiotic for her upcoming dental visit.    Her surgeon prescribed an antibiotic but it was in the same class as penicillin. She does not remember name but it start with a C.    Asking PCP to send in antibiotic to take prior to dental visits.  Will need to do this for 2 years.    Willing to send in RX or would you like a visit.    Jaxon RN, BSN  Triage nurse  [facility] [place]

## 2025-07-16 NOTE — TELEPHONE ENCOUNTER
Signed Prescriptions:                        Disp   Refills    clindamycin (CLEOCIN) 300 MG capsule       2 caps*2        Sig: Take 2 capsules (600 mg) by mouth once for 1 dose. 1           hour prior to dental procedure for prophylaxis  Authorizing Provider: MAURISIO MEZA

## 2025-07-16 NOTE — TELEPHONE ENCOUNTER
Contacted the patient to inform her medication was sent to the pharmacy, no answer left detailed message.

## 2025-09-03 ENCOUNTER — VIRTUAL VISIT (OUTPATIENT)
Dept: FAMILY MEDICINE | Facility: CLINIC | Age: 75
End: 2025-09-03
Payer: MEDICARE
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
## 2025-09-03 PROCEDURE — 98005 SYNCH AUDIO-VIDEO EST LOW 20: CPT | Performed by: PHYSICIAN ASSISTANT

## 2025-09-03 RX ORDER — METRONIDAZOLE 500 MG/1
500 TABLET ORAL 3 TIMES DAILY
Qty: 21 TABLET | Refills: 0 | Status: SHIPPED | OUTPATIENT
Start: 2025-09-03 | End: 2025-09-10

## 2025-09-03 RX ORDER — DOXYCYCLINE 100 MG/1
100 CAPSULE ORAL 2 TIMES DAILY
Qty: 14 CAPSULE | Refills: 0 | Status: SHIPPED | OUTPATIENT
Start: 2025-09-03 | End: 2025-09-10